# Patient Record
Sex: FEMALE | Race: BLACK OR AFRICAN AMERICAN | NOT HISPANIC OR LATINO | Employment: OTHER | URBAN - NONMETROPOLITAN AREA
[De-identification: names, ages, dates, MRNs, and addresses within clinical notes are randomized per-mention and may not be internally consistent; named-entity substitution may affect disease eponyms.]

---

## 2022-02-09 ENCOUNTER — APPOINTMENT (OUTPATIENT)
Dept: CT IMAGING | Facility: HOSPITAL | Age: 72
End: 2022-02-09

## 2022-02-09 ENCOUNTER — HOSPITAL ENCOUNTER (OUTPATIENT)
Facility: HOSPITAL | Age: 72
Setting detail: OBSERVATION
LOS: 1 days | Discharge: NURSING, REHAB OR LONG-TERM CARE | End: 2022-02-15
Attending: EMERGENCY MEDICINE | Admitting: STUDENT IN AN ORGANIZED HEALTH CARE EDUCATION/TRAINING PROGRAM

## 2022-02-09 ENCOUNTER — APPOINTMENT (OUTPATIENT)
Dept: GENERAL RADIOLOGY | Facility: HOSPITAL | Age: 72
End: 2022-02-09

## 2022-02-09 DIAGNOSIS — I63.9 CEREBROVASCULAR ACCIDENT (CVA), UNSPECIFIED MECHANISM: Primary | ICD-10-CM

## 2022-02-09 LAB
ALBUMIN SERPL-MCNC: 4.5 G/DL (ref 3.5–5.2)
ALBUMIN/GLOB SERPL: 1.5 G/DL
ALP SERPL-CCNC: 111 U/L (ref 39–117)
ALT SERPL W P-5'-P-CCNC: 17 U/L (ref 1–33)
ANION GAP SERPL CALCULATED.3IONS-SCNC: 13.9 MMOL/L (ref 5–15)
AST SERPL-CCNC: 20 U/L (ref 1–32)
BASOPHILS # BLD AUTO: 0.03 10*3/MM3 (ref 0–0.2)
BASOPHILS NFR BLD AUTO: 0.5 % (ref 0–1.5)
BILIRUB SERPL-MCNC: 0.2 MG/DL (ref 0–1.2)
BUN SERPL-MCNC: 19 MG/DL (ref 8–23)
BUN/CREAT SERPL: 15.7 (ref 7–25)
CALCIUM SPEC-SCNC: 9.8 MG/DL (ref 8.6–10.5)
CHLORIDE SERPL-SCNC: 101 MMOL/L (ref 98–107)
CO2 SERPL-SCNC: 23.1 MMOL/L (ref 22–29)
CREAT SERPL-MCNC: 1.21 MG/DL (ref 0.57–1)
DEPRECATED RDW RBC AUTO: 44 FL (ref 37–54)
EOSINOPHIL # BLD AUTO: 0.36 10*3/MM3 (ref 0–0.4)
EOSINOPHIL NFR BLD AUTO: 5.8 % (ref 0.3–6.2)
ERYTHROCYTE [DISTWIDTH] IN BLOOD BY AUTOMATED COUNT: 13.2 % (ref 12.3–15.4)
GFR SERPL CREATININE-BSD FRML MDRD: 53 ML/MIN/1.73
GLOBULIN UR ELPH-MCNC: 3 GM/DL
GLUCOSE SERPL-MCNC: 130 MG/DL (ref 65–99)
HCT VFR BLD AUTO: 37.6 % (ref 34–46.6)
HGB BLD-MCNC: 12.5 G/DL (ref 12–15.9)
IMM GRANULOCYTES # BLD AUTO: 0.01 10*3/MM3 (ref 0–0.05)
IMM GRANULOCYTES NFR BLD AUTO: 0.2 % (ref 0–0.5)
INR PPP: 0.84 (ref 0.9–1.1)
LYMPHOCYTES # BLD AUTO: 2.76 10*3/MM3 (ref 0.7–3.1)
LYMPHOCYTES NFR BLD AUTO: 44.1 % (ref 19.6–45.3)
MCH RBC QN AUTO: 30.1 PG (ref 26.6–33)
MCHC RBC AUTO-ENTMCNC: 33.2 G/DL (ref 31.5–35.7)
MCV RBC AUTO: 90.6 FL (ref 79–97)
MONOCYTES # BLD AUTO: 0.35 10*3/MM3 (ref 0.1–0.9)
MONOCYTES NFR BLD AUTO: 5.6 % (ref 5–12)
NEUTROPHILS NFR BLD AUTO: 2.75 10*3/MM3 (ref 1.7–7)
NEUTROPHILS NFR BLD AUTO: 43.8 % (ref 42.7–76)
NRBC BLD AUTO-RTO: 0 /100 WBC (ref 0–0.2)
NT-PROBNP SERPL-MCNC: 20.5 PG/ML (ref 0–900)
PLATELET # BLD AUTO: 395 10*3/MM3 (ref 140–450)
PMV BLD AUTO: 8.5 FL (ref 6–12)
POTASSIUM SERPL-SCNC: 4.3 MMOL/L (ref 3.5–5.2)
PROT SERPL-MCNC: 7.5 G/DL (ref 6–8.5)
PROTHROMBIN TIME: 12 SECONDS (ref 12–15.1)
RBC # BLD AUTO: 4.15 10*6/MM3 (ref 3.77–5.28)
SARS-COV-2 RNA PNL SPEC NAA+PROBE: NOT DETECTED
SODIUM SERPL-SCNC: 138 MMOL/L (ref 136–145)
TROPONIN T SERPL-MCNC: <0.01 NG/ML (ref 0–0.03)
WBC NRBC COR # BLD: 6.26 10*3/MM3 (ref 3.4–10.8)

## 2022-02-09 PROCEDURE — 85025 COMPLETE CBC W/AUTO DIFF WBC: CPT | Performed by: EMERGENCY MEDICINE

## 2022-02-09 PROCEDURE — 99220 PR INITIAL OBSERVATION CARE/DAY 70 MINUTES: CPT | Performed by: INTERNAL MEDICINE

## 2022-02-09 PROCEDURE — 85610 PROTHROMBIN TIME: CPT | Performed by: EMERGENCY MEDICINE

## 2022-02-09 PROCEDURE — 84484 ASSAY OF TROPONIN QUANT: CPT | Performed by: EMERGENCY MEDICINE

## 2022-02-09 PROCEDURE — 87635 SARS-COV-2 COVID-19 AMP PRB: CPT | Performed by: INTERNAL MEDICINE

## 2022-02-09 PROCEDURE — 96372 THER/PROPH/DIAG INJ SC/IM: CPT

## 2022-02-09 PROCEDURE — 93005 ELECTROCARDIOGRAM TRACING: CPT | Performed by: EMERGENCY MEDICINE

## 2022-02-09 PROCEDURE — 70450 CT HEAD/BRAIN W/O DYE: CPT

## 2022-02-09 PROCEDURE — 83880 ASSAY OF NATRIURETIC PEPTIDE: CPT | Performed by: EMERGENCY MEDICINE

## 2022-02-09 PROCEDURE — 25010000002 HEPARIN (PORCINE) PER 1000 UNITS: Performed by: INTERNAL MEDICINE

## 2022-02-09 PROCEDURE — G0378 HOSPITAL OBSERVATION PER HR: HCPCS

## 2022-02-09 PROCEDURE — 96361 HYDRATE IV INFUSION ADD-ON: CPT

## 2022-02-09 PROCEDURE — 25010000002 IOPAMIDOL 61 % SOLUTION: Performed by: EMERGENCY MEDICINE

## 2022-02-09 PROCEDURE — 80053 COMPREHEN METABOLIC PANEL: CPT | Performed by: EMERGENCY MEDICINE

## 2022-02-09 PROCEDURE — 96360 HYDRATION IV INFUSION INIT: CPT

## 2022-02-09 PROCEDURE — 99284 EMERGENCY DEPT VISIT MOD MDM: CPT

## 2022-02-09 PROCEDURE — 0042T HC CT CEREBRAL PERFUSION W/WO CONTRAST: CPT

## 2022-02-09 PROCEDURE — 70496 CT ANGIOGRAPHY HEAD: CPT

## 2022-02-09 PROCEDURE — 71045 X-RAY EXAM CHEST 1 VIEW: CPT

## 2022-02-09 PROCEDURE — 70498 CT ANGIOGRAPHY NECK: CPT

## 2022-02-09 RX ORDER — HEPARIN SODIUM 5000 [USP'U]/ML
5000 INJECTION, SOLUTION INTRAVENOUS; SUBCUTANEOUS EVERY 12 HOURS SCHEDULED
Status: DISCONTINUED | OUTPATIENT
Start: 2022-02-09 | End: 2022-02-15 | Stop reason: HOSPADM

## 2022-02-09 RX ORDER — CLOPIDOGREL BISULFATE 75 MG/1
300 TABLET ORAL ONCE
Status: COMPLETED | OUTPATIENT
Start: 2022-02-09 | End: 2022-02-09

## 2022-02-09 RX ORDER — ATORVASTATIN CALCIUM 80 MG/1
80 TABLET, FILM COATED ORAL NIGHTLY
Status: DISCONTINUED | OUTPATIENT
Start: 2022-02-09 | End: 2022-02-15 | Stop reason: HOSPADM

## 2022-02-09 RX ORDER — ACETAMINOPHEN 650 MG/1
650 SUPPOSITORY RECTAL EVERY 4 HOURS PRN
Status: DISCONTINUED | OUTPATIENT
Start: 2022-02-09 | End: 2022-02-15 | Stop reason: HOSPADM

## 2022-02-09 RX ORDER — ASPIRIN 81 MG/1
81 TABLET, CHEWABLE ORAL DAILY
COMMUNITY
End: 2022-06-23 | Stop reason: DRUGHIGH

## 2022-02-09 RX ORDER — ASPIRIN 81 MG/1
324 TABLET, CHEWABLE ORAL ONCE
Status: COMPLETED | OUTPATIENT
Start: 2022-02-09 | End: 2022-02-09

## 2022-02-09 RX ORDER — ASPIRIN 300 MG/1
300 SUPPOSITORY RECTAL DAILY
Status: DISCONTINUED | OUTPATIENT
Start: 2022-02-09 | End: 2022-02-12

## 2022-02-09 RX ORDER — ACETAMINOPHEN 325 MG/1
650 TABLET ORAL EVERY 4 HOURS PRN
Status: DISCONTINUED | OUTPATIENT
Start: 2022-02-09 | End: 2022-02-15 | Stop reason: HOSPADM

## 2022-02-09 RX ORDER — SODIUM CHLORIDE 0.9 % (FLUSH) 0.9 %
10 SYRINGE (ML) INJECTION EVERY 12 HOURS SCHEDULED
Status: DISCONTINUED | OUTPATIENT
Start: 2022-02-09 | End: 2022-02-12

## 2022-02-09 RX ORDER — DEXTROSE MONOHYDRATE 25 G/50ML
25 INJECTION, SOLUTION INTRAVENOUS
Status: DISCONTINUED | OUTPATIENT
Start: 2022-02-09 | End: 2022-02-15 | Stop reason: HOSPADM

## 2022-02-09 RX ORDER — CLOPIDOGREL BISULFATE 75 MG/1
75 TABLET ORAL DAILY
Status: DISCONTINUED | OUTPATIENT
Start: 2022-02-09 | End: 2022-02-15 | Stop reason: HOSPADM

## 2022-02-09 RX ORDER — GLIPIZIDE 10 MG/1
10 TABLET ORAL DAILY
COMMUNITY
End: 2022-06-23

## 2022-02-09 RX ORDER — SODIUM CHLORIDE 9 MG/ML
125 INJECTION, SOLUTION INTRAVENOUS CONTINUOUS
Status: DISCONTINUED | OUTPATIENT
Start: 2022-02-09 | End: 2022-02-12

## 2022-02-09 RX ORDER — MULTIPLE VITAMINS W/ MINERALS TAB 9MG-400MCG
1 TAB ORAL DAILY
COMMUNITY

## 2022-02-09 RX ORDER — NICOTINE POLACRILEX 4 MG
1 LOZENGE BUCCAL
Status: DISCONTINUED | OUTPATIENT
Start: 2022-02-09 | End: 2022-02-15 | Stop reason: HOSPADM

## 2022-02-09 RX ORDER — CHLORAL HYDRATE 500 MG
1000 CAPSULE ORAL
COMMUNITY

## 2022-02-09 RX ORDER — PROMETHAZINE HYDROCHLORIDE 25 MG/1
12.5 SUPPOSITORY RECTAL EVERY 6 HOURS PRN
Status: DISCONTINUED | OUTPATIENT
Start: 2022-02-09 | End: 2022-02-15 | Stop reason: HOSPADM

## 2022-02-09 RX ORDER — ASPIRIN 81 MG/1
81 TABLET, CHEWABLE ORAL DAILY
Status: DISCONTINUED | OUTPATIENT
Start: 2022-02-09 | End: 2022-02-15 | Stop reason: HOSPADM

## 2022-02-09 RX ORDER — PROMETHAZINE HYDROCHLORIDE 12.5 MG/1
12.5 TABLET ORAL EVERY 6 HOURS PRN
Status: DISCONTINUED | OUTPATIENT
Start: 2022-02-09 | End: 2022-02-15 | Stop reason: HOSPADM

## 2022-02-09 RX ORDER — SODIUM CHLORIDE 0.9 % (FLUSH) 0.9 %
10 SYRINGE (ML) INJECTION AS NEEDED
Status: DISCONTINUED | OUTPATIENT
Start: 2022-02-09 | End: 2022-02-15 | Stop reason: HOSPADM

## 2022-02-09 RX ADMIN — HEPARIN SODIUM 5000 UNITS: 5000 INJECTION, SOLUTION INTRAVENOUS; SUBCUTANEOUS at 21:39

## 2022-02-09 RX ADMIN — IOPAMIDOL 50 ML: 612 INJECTION, SOLUTION INTRAVENOUS at 17:40

## 2022-02-09 RX ADMIN — CLOPIDOGREL BISULFATE 300 MG: 75 TABLET ORAL at 17:24

## 2022-02-09 RX ADMIN — SODIUM CHLORIDE 125 ML/HR: 9 INJECTION, SOLUTION INTRAVENOUS at 18:18

## 2022-02-09 RX ADMIN — IOPAMIDOL 100 ML: 612 INJECTION, SOLUTION INTRAVENOUS at 17:40

## 2022-02-09 RX ADMIN — SODIUM CHLORIDE, PRESERVATIVE FREE 10 ML: 5 INJECTION INTRAVENOUS at 21:43

## 2022-02-09 RX ADMIN — ASPIRIN 81 MG CHEWABLE TABLET 324 MG: 81 TABLET CHEWABLE at 17:24

## 2022-02-09 NOTE — ED PROVIDER NOTES
TRIAGE CHIEF COMPLAINT:     Nursing and triage notes reviewed    Chief Complaint   Patient presents with   • Facial Droop      HPI: Deborah Jones is a 71 y.o. female who presents to the emergency department complaining of right-sided facial droop and right-sided leg weakness.  Patient states that around 3 to 3-1/2 hours prior to arrival she noticed some heaviness in her right lower extremity.  She was still able to move it and walk but states it was harder than normal.  She states this lasted approximately an hour and has resolved.  She states there is still a slight odd sensation but does not feel weak currently.  She states approximately an hour and half prior to arrival she noticed some drooping on the right side of her face.  She denies any weakness in her right upper extremity.  She denies any symptoms in her left upper extremity, the left side of her face, her left lower extremity.  She denies any history of stroke.  She does have a history of diabetes.  She denies chest pain, shortness of breath, palpitations.  No fevers or chills.    REVIEW OF SYSTEMS: All other systems reviewed and are negative     PAST MEDICAL HISTORY:   No past medical history on file.     FAMILY HISTORY:   No family history on file.     SOCIAL HISTORY:   Social History     Socioeconomic History   • Marital status:         SURGICAL HISTORY:   No past surgical history on file.     CURRENT MEDICATIONS:      Medication List      ASK your doctor about these medications    glipizide 10 MG tablet  Commonly known as: GLUCOTROL     metFORMIN 1000 MG tablet  Commonly known as: GLUCOPHAGE             ALLERGIES: Patient has no known allergies.     PHYSICAL EXAM:   VITAL SIGNS:   Vitals:    02/09/22 1614   BP: 142/95   Pulse: 97   Resp: 19   Temp: 97.6 °F (36.4 °C)   SpO2: 99%      CONSTITUTIONAL: Awake, oriented, appears nontoxic   HENT: Atraumatic, normocephalic, oral mucosa pink and moist, airway patent. Nares patent without drainage.  External ears normal.   EYES: Conjunctivae clear, EOMI, PERRL   NECK: Trachea midline, nontender, supple   CARDIOVASCULAR: Normal heart rate, Normal rhythm, No murmurs, rubs, gallops   PULMONARY/CHEST: Clear to auscultation, no rhonchi, wheezes, or rales. Symmetrical breath sounds.  ABDOMINAL: Nondistended, soft, nontender - no rebound or guarding.  NEUROLOGIC: There is no noticeable weakness of the right upper or right lower extremities when compared to the left.  There is mild facial droop on the right side of patient's face.  There may be subtle involvement in patient's right eye but no obvious loss of forehead creases.  Pupils are equal round and reactive to light.  EXTREMITIES: No clubbing, cyanosis, or edema   SKIN: Warm, Dry, No erythema, No rash     ED COURSE / MEDICAL DECISION MAKING:   Deborah Jones is a 71 y.o. female who presents to the emergency department for evaluation of symptoms concerning for stroke.  Patient is nondistressed on arrival and vital signs are stable.  Examination does reveal mild right-sided facial droop.  The remainder of the neurologic exam is unremarkable.  Patient sent over for CT stroke protocol immediately prior to my evaluating the patient.    I evaluated patient immediately after returning from CT stroke protocol.  No facial droop noted at that time.    CT scan has been interpreted by the time I returned to the computer, this reveals acute or subacute basal ganglia stroke.  Stroke neurologist is currently in conference with another patient in the emergency department and we will consult him immediately after he has done evaluating that patient.    After speaking to the neurologist he reviewed the CT scan as well as the report provided by the radiologist and agreed with this assessment of a stroke.  He recommended obtaining CT angiograms and an MRI and admitting the patient to the hospitalist service for further treatment and evaluation.  He recommended aspirin and Plavix.  Did  not recommend TPA at this time.    EKG interpreted by me reveals sinus rhythm with rate of 76 bpm.  There is locators voltage in chest leads.  No obvious acute ischemic findings.  This is an atypical appearing EKG.    Patient's laboratory tests are largely unremarkable including white blood cell count, cardiac enzymes.    We will admit to the hospitalist service for MRI and further evaluation.    Critical care time for this encounter excluding any procedure: 30 minutes    DECISION TO DISCHARGE/ADMIT: see ED care timeline     FINAL IMPRESSION:   1 --CVA  2 --   3 --     Electronically signed by: Radha Barboza MD, 2/9/2022 16:55 Radha Jennings MD  02/09/22 7714

## 2022-02-10 ENCOUNTER — APPOINTMENT (OUTPATIENT)
Dept: CARDIOLOGY | Facility: HOSPITAL | Age: 72
End: 2022-02-10

## 2022-02-10 ENCOUNTER — APPOINTMENT (OUTPATIENT)
Dept: MRI IMAGING | Facility: HOSPITAL | Age: 72
End: 2022-02-10

## 2022-02-10 LAB
ANION GAP SERPL CALCULATED.3IONS-SCNC: 9.2 MMOL/L (ref 5–15)
BASOPHILS # BLD AUTO: 0.02 10*3/MM3 (ref 0–0.2)
BASOPHILS NFR BLD AUTO: 0.4 % (ref 0–1.5)
BH CV ECHO MEAS - AO MAX PG (FULL): 4.3 MMHG
BH CV ECHO MEAS - AO MAX PG: 8 MMHG
BH CV ECHO MEAS - AO MEAN PG (FULL): 2 MMHG
BH CV ECHO MEAS - AO MEAN PG: 4 MMHG
BH CV ECHO MEAS - AO ROOT AREA (BSA CORRECTED): 1.5
BH CV ECHO MEAS - AO ROOT AREA: 4.3 CM^2
BH CV ECHO MEAS - AO ROOT DIAM: 2.4 CM
BH CV ECHO MEAS - AO V2 MAX: 139 CM/SEC
BH CV ECHO MEAS - AO V2 MEAN: 98.5 CM/SEC
BH CV ECHO MEAS - AO V2 VTI: 35.7 CM
BH CV ECHO MEAS - AVA(I,A): 1.8 CM^2
BH CV ECHO MEAS - AVA(I,D): 1.8 CM^2
BH CV ECHO MEAS - AVA(V,A): 1.9 CM^2
BH CV ECHO MEAS - AVA(V,D): 1.9 CM^2
BH CV ECHO MEAS - BSA(HAYCOCK): 1.6 M^2
BH CV ECHO MEAS - BSA: 1.5 M^2
BH CV ECHO MEAS - BZI_BMI: 24.4 KILOGRAMS/M^2
BH CV ECHO MEAS - BZI_METRIC_HEIGHT: 152.4 CM
BH CV ECHO MEAS - BZI_METRIC_WEIGHT: 56.7 KG
BH CV ECHO MEAS - EDV(CUBED): 80.6 ML
BH CV ECHO MEAS - EDV(MOD-SP2): 51.7 ML
BH CV ECHO MEAS - EDV(MOD-SP4): 68.7 ML
BH CV ECHO MEAS - EDV(TEICH): 84 ML
BH CV ECHO MEAS - EF(CUBED): 64.8 %
BH CV ECHO MEAS - EF(MOD-BP): 70 %
BH CV ECHO MEAS - EF(MOD-SP2): 66.7 %
BH CV ECHO MEAS - EF(MOD-SP4): 74.4 %
BH CV ECHO MEAS - EF(TEICH): 56.6 %
BH CV ECHO MEAS - ESV(CUBED): 28.4 ML
BH CV ECHO MEAS - ESV(MOD-SP2): 17.2 ML
BH CV ECHO MEAS - ESV(MOD-SP4): 17.6 ML
BH CV ECHO MEAS - ESV(TEICH): 36.4 ML
BH CV ECHO MEAS - FS: 29.4 %
BH CV ECHO MEAS - IVS/LVPW: 0.92
BH CV ECHO MEAS - IVSD: 0.73 CM
BH CV ECHO MEAS - LA DIMENSION: 2.4 CM
BH CV ECHO MEAS - LA/AO: 1
BH CV ECHO MEAS - LAD MAJOR: 5.5 CM
BH CV ECHO MEAS - LAT PEAK E' VEL: 6.6 CM/SEC
BH CV ECHO MEAS - LATERAL E/E' RATIO: 13.3
BH CV ECHO MEAS - LV DIASTOLIC VOL/BSA (35-75): 44.9 ML/M^2
BH CV ECHO MEAS - LV MASS(C)D: 99.2 GRAMS
BH CV ECHO MEAS - LV MASS(C)DI: 64.9 GRAMS/M^2
BH CV ECHO MEAS - LV MAX PG: 3.7 MMHG
BH CV ECHO MEAS - LV MEAN PG: 2 MMHG
BH CV ECHO MEAS - LV SYSTOLIC VOL/BSA (12-30): 11.5 ML/M^2
BH CV ECHO MEAS - LV V1 MAX: 96.8 CM/SEC
BH CV ECHO MEAS - LV V1 MEAN: 65.9 CM/SEC
BH CV ECHO MEAS - LV V1 VTI: 23.5 CM
BH CV ECHO MEAS - LVIDD: 4.3 CM
BH CV ECHO MEAS - LVIDS: 3.1 CM
BH CV ECHO MEAS - LVLD AP2: 6.4 CM
BH CV ECHO MEAS - LVLD AP4: 6.6 CM
BH CV ECHO MEAS - LVLS AP2: 4.8 CM
BH CV ECHO MEAS - LVLS AP4: 5.6 CM
BH CV ECHO MEAS - LVOT AREA (M): 2.7 CM^2
BH CV ECHO MEAS - LVOT AREA: 2.7 CM^2
BH CV ECHO MEAS - LVOT DIAM: 1.8 CM
BH CV ECHO MEAS - LVPWD: 0.79 CM
BH CV ECHO MEAS - MED PEAK E' VEL: 6.5 CM/SEC
BH CV ECHO MEAS - MEDIAL E/E' RATIO: 13.5
BH CV ECHO MEAS - MV A MAX VEL: 105 CM/SEC
BH CV ECHO MEAS - MV DEC TIME: 0.28 SEC
BH CV ECHO MEAS - MV E MAX VEL: 88.3 CM/SEC
BH CV ECHO MEAS - MV E/A: 0.84
BH CV ECHO MEAS - MV MAX PG: 5.4 MMHG
BH CV ECHO MEAS - MV MEAN PG: 2 MMHG
BH CV ECHO MEAS - MV V2 MAX: 116 CM/SEC
BH CV ECHO MEAS - MV V2 MEAN: 52.7 CM/SEC
BH CV ECHO MEAS - MV V2 VTI: 39.1 CM
BH CV ECHO MEAS - MVA(VTI): 1.6 CM^2
BH CV ECHO MEAS - PA ACC TIME: 0.08 SEC
BH CV ECHO MEAS - PA MAX PG (FULL): 2.5 MMHG
BH CV ECHO MEAS - PA MAX PG: 3.8 MMHG
BH CV ECHO MEAS - PA MEAN PG (FULL): 1 MMHG
BH CV ECHO MEAS - PA MEAN PG: 2 MMHG
BH CV ECHO MEAS - PA PR(ACCEL): 42.6 MMHG
BH CV ECHO MEAS - PA V2 MAX: 97.6 CM/SEC
BH CV ECHO MEAS - PA V2 MEAN: 68.1 CM/SEC
BH CV ECHO MEAS - PA V2 VTI: 21.8 CM
BH CV ECHO MEAS - RAP SYSTOLE: 3 MMHG
BH CV ECHO MEAS - RV MAX PG: 1.3 MMHG
BH CV ECHO MEAS - RV MEAN PG: 1 MMHG
BH CV ECHO MEAS - RV V1 MAX: 57.6 CM/SEC
BH CV ECHO MEAS - RV V1 MEAN: 41.3 CM/SEC
BH CV ECHO MEAS - RV V1 VTI: 12.8 CM
BH CV ECHO MEAS - RVSP: 27 MMHG
BH CV ECHO MEAS - SI(AO): 101.3 ML/M^2
BH CV ECHO MEAS - SI(CUBED): 34.2 ML/M^2
BH CV ECHO MEAS - SI(LVOT): 40.9 ML/M^2
BH CV ECHO MEAS - SI(MOD-SP2): 22.6 ML/M^2
BH CV ECHO MEAS - SI(MOD-SP4): 33.4 ML/M^2
BH CV ECHO MEAS - SI(TEICH): 31.1 ML/M^2
BH CV ECHO MEAS - SV(AO): 154.8 ML
BH CV ECHO MEAS - SV(CUBED): 52.2 ML
BH CV ECHO MEAS - SV(LVOT): 62.5 ML
BH CV ECHO MEAS - SV(MOD-SP2): 34.5 ML
BH CV ECHO MEAS - SV(MOD-SP4): 51.1 ML
BH CV ECHO MEAS - SV(TEICH): 47.5 ML
BH CV ECHO MEAS - TAPSE (>1.6): 2.1 CM
BH CV ECHO MEAS - TR MAX PG: 24 MMHG
BH CV ECHO MEAS - TR MAX VEL: 242.5 CM/SEC
BH CV ECHO MEASUREMENTS AVERAGE E/E' RATIO: 13.48
BH CV XLRA - RV BASE: 4.1 CM
BH CV XLRA - RV LENGTH: 6.4 CM
BH CV XLRA - RV MID: 2.9 CM
BH CV XLRA - TDI S': 15 CM/SEC
BUN SERPL-MCNC: 16 MG/DL (ref 8–23)
BUN/CREAT SERPL: 14.5 (ref 7–25)
CALCIUM SPEC-SCNC: 8.9 MG/DL (ref 8.6–10.5)
CHLORIDE SERPL-SCNC: 110 MMOL/L (ref 98–107)
CHOLEST SERPL-MCNC: 232 MG/DL (ref 0–200)
CO2 SERPL-SCNC: 20.8 MMOL/L (ref 22–29)
CREAT SERPL-MCNC: 1.1 MG/DL (ref 0.57–1)
DEPRECATED RDW RBC AUTO: 45.4 FL (ref 37–54)
EOSINOPHIL # BLD AUTO: 0.33 10*3/MM3 (ref 0–0.4)
EOSINOPHIL NFR BLD AUTO: 5.9 % (ref 0.3–6.2)
ERYTHROCYTE [DISTWIDTH] IN BLOOD BY AUTOMATED COUNT: 13.3 % (ref 12.3–15.4)
GFR SERPL CREATININE-BSD FRML MDRD: 60 ML/MIN/1.73
GLUCOSE BLDC GLUCOMTR-MCNC: 145 MG/DL (ref 70–130)
GLUCOSE BLDC GLUCOMTR-MCNC: 146 MG/DL (ref 70–130)
GLUCOSE BLDC GLUCOMTR-MCNC: 150 MG/DL (ref 70–130)
GLUCOSE BLDC GLUCOMTR-MCNC: 185 MG/DL (ref 70–130)
GLUCOSE SERPL-MCNC: 145 MG/DL (ref 65–99)
HBA1C MFR BLD: 7.8 % (ref 4.8–5.6)
HCT VFR BLD AUTO: 37.9 % (ref 34–46.6)
HDLC SERPL-MCNC: 61 MG/DL (ref 40–60)
HGB BLD-MCNC: 12.1 G/DL (ref 12–15.9)
IMM GRANULOCYTES # BLD AUTO: 0.01 10*3/MM3 (ref 0–0.05)
IMM GRANULOCYTES NFR BLD AUTO: 0.2 % (ref 0–0.5)
LDLC SERPL CALC-MCNC: 150 MG/DL (ref 0–100)
LDLC/HDLC SERPL: 2.42 {RATIO}
LEFT ATRIUM VOLUME INDEX: 32 ML/M^2
LEFT ATRIUM VOLUME: 48.9 ML
LYMPHOCYTES # BLD AUTO: 2.76 10*3/MM3 (ref 0.7–3.1)
LYMPHOCYTES NFR BLD AUTO: 49.4 % (ref 19.6–45.3)
MCH RBC QN AUTO: 29.7 PG (ref 26.6–33)
MCHC RBC AUTO-ENTMCNC: 31.9 G/DL (ref 31.5–35.7)
MCV RBC AUTO: 92.9 FL (ref 79–97)
MONOCYTES # BLD AUTO: 0.35 10*3/MM3 (ref 0.1–0.9)
MONOCYTES NFR BLD AUTO: 6.3 % (ref 5–12)
NEUTROPHILS NFR BLD AUTO: 2.12 10*3/MM3 (ref 1.7–7)
NEUTROPHILS NFR BLD AUTO: 37.8 % (ref 42.7–76)
NRBC BLD AUTO-RTO: 0 /100 WBC (ref 0–0.2)
PLATELET # BLD AUTO: 360 10*3/MM3 (ref 140–450)
PMV BLD AUTO: 8.7 FL (ref 6–12)
POTASSIUM SERPL-SCNC: 4.2 MMOL/L (ref 3.5–5.2)
RBC # BLD AUTO: 4.08 10*6/MM3 (ref 3.77–5.28)
SODIUM SERPL-SCNC: 140 MMOL/L (ref 136–145)
TRIGL SERPL-MCNC: 118 MG/DL (ref 0–150)
VLDLC SERPL-MCNC: 21 MG/DL (ref 5–40)
WBC NRBC COR # BLD: 5.59 10*3/MM3 (ref 3.4–10.8)

## 2022-02-10 PROCEDURE — 99204 OFFICE O/P NEW MOD 45 MIN: CPT | Performed by: PSYCHIATRY & NEUROLOGY

## 2022-02-10 PROCEDURE — 70551 MRI BRAIN STEM W/O DYE: CPT

## 2022-02-10 PROCEDURE — 96372 THER/PROPH/DIAG INJ SC/IM: CPT

## 2022-02-10 PROCEDURE — 92523 SPEECH SOUND LANG COMPREHEN: CPT

## 2022-02-10 PROCEDURE — G0378 HOSPITAL OBSERVATION PER HR: HCPCS

## 2022-02-10 PROCEDURE — 93306 TTE W/DOPPLER COMPLETE: CPT | Performed by: INTERNAL MEDICINE

## 2022-02-10 PROCEDURE — 82962 GLUCOSE BLOOD TEST: CPT

## 2022-02-10 PROCEDURE — 93306 TTE W/DOPPLER COMPLETE: CPT

## 2022-02-10 PROCEDURE — 80061 LIPID PANEL: CPT | Performed by: INTERNAL MEDICINE

## 2022-02-10 PROCEDURE — 25010000002 HEPARIN (PORCINE) PER 1000 UNITS: Performed by: INTERNAL MEDICINE

## 2022-02-10 PROCEDURE — 99225 PR SBSQ OBSERVATION CARE/DAY 25 MINUTES: CPT | Performed by: FAMILY MEDICINE

## 2022-02-10 PROCEDURE — 96361 HYDRATE IV INFUSION ADD-ON: CPT

## 2022-02-10 PROCEDURE — 92610 EVALUATE SWALLOWING FUNCTION: CPT

## 2022-02-10 PROCEDURE — 80048 BASIC METABOLIC PNL TOTAL CA: CPT | Performed by: INTERNAL MEDICINE

## 2022-02-10 PROCEDURE — 83036 HEMOGLOBIN GLYCOSYLATED A1C: CPT | Performed by: INTERNAL MEDICINE

## 2022-02-10 PROCEDURE — 85025 COMPLETE CBC W/AUTO DIFF WBC: CPT | Performed by: INTERNAL MEDICINE

## 2022-02-10 RX ORDER — DEXTROSE MONOHYDRATE 25 G/50ML
25 INJECTION, SOLUTION INTRAVENOUS
Status: DISCONTINUED | OUTPATIENT
Start: 2022-02-10 | End: 2022-02-15 | Stop reason: HOSPADM

## 2022-02-10 RX ORDER — NICOTINE POLACRILEX 4 MG
1 LOZENGE BUCCAL
Status: DISCONTINUED | OUTPATIENT
Start: 2022-02-10 | End: 2022-02-15 | Stop reason: HOSPADM

## 2022-02-10 RX ADMIN — HEPARIN SODIUM 5000 UNITS: 5000 INJECTION, SOLUTION INTRAVENOUS; SUBCUTANEOUS at 21:05

## 2022-02-10 RX ADMIN — CLOPIDOGREL BISULFATE 75 MG: 75 TABLET ORAL at 11:30

## 2022-02-10 RX ADMIN — SODIUM CHLORIDE, PRESERVATIVE FREE 10 ML: 5 INJECTION INTRAVENOUS at 21:05

## 2022-02-10 RX ADMIN — ATORVASTATIN CALCIUM 80 MG: 80 TABLET, FILM COATED ORAL at 21:05

## 2022-02-10 RX ADMIN — HEPARIN SODIUM 5000 UNITS: 5000 INJECTION, SOLUTION INTRAVENOUS; SUBCUTANEOUS at 09:00

## 2022-02-10 RX ADMIN — SODIUM CHLORIDE 125 ML/HR: 9 INJECTION, SOLUTION INTRAVENOUS at 03:35

## 2022-02-10 RX ADMIN — ASPIRIN 81 MG CHEWABLE TABLET 81 MG: 81 TABLET CHEWABLE at 11:30

## 2022-02-10 RX ADMIN — SODIUM CHLORIDE, PRESERVATIVE FREE 10 ML: 5 INJECTION INTRAVENOUS at 09:00

## 2022-02-10 NOTE — CASE MANAGEMENT/SOCIAL WORK
Discharge Planning Assessment  [facility]     Patient Name: Deborah Jones  MRN: [id number]  Today's Date: 2/10/2022    Admit Date: 2/9/2022     Discharge Needs Assessment     Row Name 02/10/22 1256       Living Environment    Lives With spouse    Name(s) of Who Lives With Patient just got     Current Living Arrangements home/apartment/condo    Primary Care Provided by self    Provides Primary Care For no one    Family Caregiver if Needed spouse    Quality of Family Relationships supportive    Able to Return to Prior Arrangements yes    Living Arrangement Comments if needed will agree to rehab       Resource/Environmental Concerns    Resource/Environmental Concerns none       Transition Planning    Patient/Family Anticipates Transition to inpatient rehabilitation facility; home with help/services    Patient/Family Anticipated Services at Transition home health care; rehabilitation services    Transportation Anticipated family or friend will provide       Discharge Needs Assessment    Readmission Within the Last 30 Days no previous admission in last 30 days    Equipment Currently Used at Home glucometer    Concerns to be Addressed discharge planning    Anticipated Changes Related to Illness inability to care for self    Equipment Needed After Discharge walker, rolling    Outpatient/Agency/Support Group Needs homecare agency; inpatient rehabilitation facility    Discharge Facility/Level of Care Needs home with home health; rehabilitation facility    Provided Post Acute Provider List? N/A    Provided Post Acute Provider Quality & Resource List? N/A    Delivered To Patient; Support Person    Method of Delivery In person    Patient's Choice of Community Agency(s) discussed rehab options and didn't want to see list; chose [facility] , daughter works there for many years as a PCT    Current Discharge Risk physical impairment               Discharge Plan     Row Name 02/10/22 1300       Plan    Plan pt resting in  bed; daughter Nisa at bedside; permission to speak with her also; pt alert and joyful; pt stated daughter works as pct at [employer] for many years and she has worked since she was very young also; pt just got ; per daughter before this stroke pt very active; gardens, walks, totally cares for herself with no issues and also helps her  care for his 93 year old mother that lives with them; discussed option of possible rehab if needed and they stated if needed she wanted to go to [facility] and approved the sending of referral; stated primary physicain is at [facility]; completed moon with pt; CM info card given and explained; no other needs at this time; referral to be sent to [facility]              Continued Care and Services - Admitted Since 2/9/2022    Coordination has not been started for this encounter.          Demographic Summary     Row Name 02/10/22 9328       General Information    Admission Type observation    Arrived From emergency department    Required Notices Provided Observation Status Notice  completed moon with pt    Referral Source admission list    Reason for Consult discharge planning    Preferred Language English     Used During This Interaction no       Contact Information    Permission Granted to Share Info With family/designee  stated can speak with daughter Nisa in room also               Functional Status     Row Name 02/10/22 1255       Functional Status    Usual Activity Tolerance good    Current Activity Tolerance good    Functional Status Comments before this stroke; pulling weeds in garden ; walks, very active       Functional Status, IADL    Medications independent    Meal Preparation independent    Housekeeping independent    Laundry independent    Shopping independent       Mental Status    General Appearance WDL ex  slight slur and left side of face noted to be slightly drooped       Mental Status Summary    Recent Changes in Mental  Status/Cognitive Functioning no changes                         Pat Borges, RN

## 2022-02-10 NOTE — CASE MANAGEMENT/SOCIAL WORK
Continued Stay Note  [facility]     Patient Name: Deborah Jones  MRN: [id number]  Today's Date: 2/10/2022    Admit Date: 2/9/2022     Discharge Plan     Row Name 02/10/22 1356       Plan    Plan called and spoke to [facility] stated would look over and let us know; will watch for PT notes tomorrow to assess also, since pt bedridden today    Row Name 02/10/22 1300       Plan    Plan pt resting in bed; daughter Bib at bedside; permission to speak with her also; pt alert and joyful; pt stated daughter works as pct at [employer] for many years and she has worked since she was very young;               Discharge Codes    No documentation.                     Pat Borges RN

## 2022-02-10 NOTE — THERAPY EVALUATION
Hold OT eval per neuro; strict bedrest with head of bed flat for 24 hours per patient/family reporting; will follow up tomorrow as appropriate.

## 2022-02-10 NOTE — PLAN OF CARE
Problem: Adult Inpatient Plan of Care  Goal: Plan of Care Review  Outcome: Ongoing, Progressing  Flowsheets  Taken 2/10/2022 1210 by Lu Barcenas  Progress: no change  Outcome Summary: Bedside eval of pt.'s swallow at bedside and informal communication assessment completed.  Pt. was seated upright in bed with her  present.  Pt. was in good spirits and voiced complaints only of R sided weakness and some slurring of speech.  Oral mech was remarkable for mild dysarthria of speech due to oral weakness, but intelligibility not significantly affected.  Pt. exibited good awareness of deficits as well.  She was given trials of regular solid, puree, and thin liquid.  Oral phase was WFL with all trials.  Mild oral strength deficits did not adversely affect bolus prep, manipulation, transit, or oral clearance with any trial.  No overt s/s aspiration with any trial.  Pt. denied difficulty with po.  Regarding commuication, auditory comprehension was WFL, verbal expression was WFL, and cognition was WFL.  No appreciable deficits exhibited that would adversely affect swallowing or commuication for ADL's or social needs.  Pt. will be given instruction and education in oral motor exercises to improve R labial strength.  Recommend:  1. regular diet with thin liq as herve,  2. meds whole with thin liq as herve,  3. aspiration precautions.  Taken 2/10/2022 0311 by Catarina Jacobs, RN  Plan of Care Reviewed With: patient   Goal Outcome Evaluation:           Progress: no change  Outcome Summary: Bedside eval of pt.'s swallow at bedside and informal communication assessment completed.  Pt. was seated upright in bed with her  present.  Pt. was in good spirits and voiced complaints only of R sided weakness and some slurring of speech.  Oral mech was remarkable for mild dysarthria of speech due to oral weakness, but intelligibility not significantly affected.  Pt. exibited good awareness of deficits as well.  She was given  trials of regular solid, puree, and thin liquid.  Oral phase was WFL with all trials.  Mild oral strength deficits did not adversely affect bolus prep, manipulation, transit, or oral clearance with any trial.  No overt s/s aspiration with any trial.  Pt. denied difficulty with po.  Regarding commuication, auditory comprehension was WFL, verbal expression was WFL, and cognition was WFL.  No appreciable deficits exhibited that would adversely affect swallowing or commuication for ADL's or social needs.  Pt. will be given instruction and education in oral motor exercises to improve R labial strength.  Recommend:  1. regular diet with thin liq as herve,  2. meds whole with thin liq as herve,  3. aspiration precautions.

## 2022-02-10 NOTE — CONSULTS
Stroke Consult Note    Patient Name: Deborah Jones   MRN: [id number]  Age: 71 y.o.  Sex: female  : 1950    Primary Care Physician: Provider, No Known  Referring Physician:  No ref. provider found    Handedness: Right  Race: Black    Chief Complaint/Reason for Consultation: Right-sided weakness    Subjective .  HPI: 71-year-old right-handed black female with known diagnosis of hypertension, diabetes, smoking cigars, who comes in with acute onset of right-sided weakness starting at 2:30 PM yesterday.  Patient was out of the window for TPA.  Patient noted to have mild right hemiparesis and right facial droop and mild dysarthria.  No history of stroke in the past.  Patient is on aspirin 81 mg daily, although she does not take it every day.    Last Known Normal Date/Time: 2:30 PM to 2022 EST     Review of Systems   Constitutional: Negative.    HENT: Negative.    Eyes: Negative.    Respiratory: Negative.    Cardiovascular: Negative.    Gastrointestinal: Negative.    Endocrine: Negative.    Genitourinary: Negative.    Musculoskeletal: Negative.    Skin: Negative.    Allergic/Immunologic: Negative.    Psychiatric/Behavioral: Negative.       No past medical history on file.  No past surgical history on file.  No family history on file.  Social History     Socioeconomic History   • Marital status:    Tobacco Use   • Smoking status: Current Every Day Smoker     Types: Cigars     No Known Allergies  Prior to Admission medications    Medication Sig Start Date End Date Taking? Authorizing Provider   aspirin 81 MG chewable tablet Chew 81 mg Daily.   Yes Dali Manuel MD   glipizide (GLUCOTROL) 10 MG tablet Take 10 mg by mouth Daily.   Yes Dali Manuel MD   metFORMIN (GLUCOPHAGE) 1000 MG tablet Take 1,000 mg by mouth Daily.   Yes Dali Manuel MD   multivitamin with minerals tablet tablet Take 1 tablet by mouth Daily.   Yes Dali Manuel MD   Omega-3 Fatty Acids (fish oil) 1000 MG  capsule capsule Take 1,000 mg by mouth Daily With Breakfast.   Yes Provider, MD Dali             Objective     Temp:  [97.6 °F (36.4 °C)-98.3 °F (36.8 °C)] 97.9 °F (36.6 °C)  Heart Rate:  [55-97] 55  Resp:  [17-20] 17  BP: (118-142)/(63-98) 133/83  Neurological Exam  Mental Status  Awake, alert and oriented to person, place and time.Alert. Mild dysarthria present. Language is fluent with no aphasia. Attention and concentration are normal.    Cranial Nerves  CN II: Visual fields full to confrontation.  CN III, IV, VI: Extraocular movements intact bilaterally. Pupils equal round and reactive to light bilaterally.  CN V: Facial sensation is normal.  CN VII: Full and symmetric facial movement.  CN VIII: Equal hearing.  CN IX, X: Palate elevates symmetrically. Normal gag reflex.  CN XI: Shoulder shrug strength is normal.  CN XII: Tongue midline without atrophy or fasciculations.    Motor  Normal muscle bulk throughout. No fasciculations present.  RUE/RLE- drift noted.    Sensory  Light touch is normal in upper and lower extremities.     Reflexes  Not assessed.    Coordination  No dysmetria.    Gait  Not assesed.      Physical Exam  Vitals and nursing note reviewed.   Constitutional:       Appearance: Normal appearance.   HENT:      Head: Normocephalic and atraumatic.      Mouth/Throat:      Mouth: Mucous membranes are moist.      Pharynx: Oropharynx is clear.   Eyes:      Extraocular Movements: Extraocular movements intact.      Conjunctiva/sclera: Conjunctivae normal.      Pupils: Pupils are equal, round, and reactive to light.   Cardiovascular:      Rate and Rhythm: Normal rate and regular rhythm.   Pulmonary:      Effort: Pulmonary effort is normal. No respiratory distress.   Musculoskeletal:      Cervical back: Normal range of motion and neck supple.   Neurological:      Mental Status: She is alert.      Cranial Nerves: Dysarthria present.   Psychiatric:         Mood and Affect: Mood normal.         Behavior:  Behavior normal.         Acute Stroke Data    IV Thrombolytic (TPA/Tenecteplase) Inclusion / Exclusion Criteria    Time: 11:47 EST  Person Administering Scale: Seth Perez MD    Inclusion Criteria  [x]   18 years of age or greater   []   Onset of symptoms < 4.5 hours before beginning treatment (stroke onset = time patient was last seen well or without symptoms).   [x]   Diagnosis of acute ischemic stroke causing measurable disabling deficit (Complete Hemianopia, Any Aphasia, Visual or Sensory Extinction, Any weakness limiting sustained effort against gravity)   []   Any remaining deficit considered potentially disabling in view of patient and practitioner   Exclusion criteria (Do not proceed with Alteplase if any are checked under exclusion criteria)  [x]   Onset unknown or GREATER than 4.5 hours   []   ICH on CT/MRI   []   CT demonstrates hypodensity representing acute or subacute infarct   []   Significant head trauma or prior stroke in the previous 3 months   []   Symptoms suggestive of subarachnoid hemorrhage   []   History of un-ruptured intracranial aneurysm GREATER than 10 mm   []   Recent intracranial or intraspinal surgery within the last 3 months   []   Arterial puncture at a non-compressible site in the previous 7 days   []   Active internal bleeding   []   Acute bleeding tendency   []   Platelet count LESS than 100,000 for known hematological diseases such as leukemia, thrombocytopenia or chronic cirrhosis   []   Current use of anticoagulant with INR GREATER than 1.7 or PT GREATER than 15 seconds, aPTT GREATER than 40 seconds   []   Heparin received within 48 hours, resulting in abnormally elevated aPTT GREATER than upper limit of normal   []   Current use of direct thrombin inhibitors or direct factor Xa inhibitors in the past 48 hours   []   Elevated blood pressure refractory to treatment (systolic GREATER than 185 mm/Hg or diastolic  GREATER than 110 mm/Hg   []   Suspected infective endocarditis  and aortic arch dissection   []   Current use of therapeutic treatment dose of low-molecular-weight heparin (LMWH) within the previous 24 hours   []   Structural GI malignancy or bleed   Relative exclusion for all patients  []   Only minor nondisabling symptoms   []   Pregnancy   []   Seizure at onset with postictal residual neurological impairments   []   Major surgery or previous trauma within past 14 days   []   History of previous spontaneous ICH, intracranial neoplasm, or AV malformation   []   Postpartum (within previous 14 days)   []   Recent GI or urinary tract hemorrhage (within previous 21 days)   []   Recent acute MI (within previous 3 months)   []   History of unruptured intracranial aneurysm LESS than 10 mm   []   History of ruptured intracranial aneurysm   []   Blood glucose LESS than 50 mg/dL (2.7 mmol/L)   []   Dural puncture within the last 7 days   []   Known GREATER than 10 cerebral microbleeds   Additional exclusions for patients with symptoms onset between 3 and 4.5 hours.  []   Age > 80.   []   On any anticoagulants regardless of INR  >>> Warfarin (Coumadin), Heparin, Enoxaparin (Lovenox), fondaparinux (Arixtra), bivalirudin (Angiomax), Argatroban, dabigatran (Pradaxa), rivaroxaban (Xarelto), or apixaban (Eliquis)   []   Severe stroke (NIHSS > 25).   []   History of BOTH diabetes and previous ischemic stroke.   []   The risks and benefits have been discussed with the patient or family related to the administration of IV alteplase for stroke symptoms.   []   I have discussed and reviewed the patient's case and imaging with the attending prior to IV Thrombolytic (TPA/Tenecteplase).    Time Thrombolytic administered       Hospital Meds:  Scheduled- aspirin, 81 mg, Oral, Daily   Or  aspirin, 300 mg, Rectal, Daily  atorvastatin, 80 mg, Oral, Nightly  clopidogrel, 75 mg, Oral, Daily  heparin (porcine), 5,000 Units, Subcutaneous, Q12H  insulin aspart, 0-7 Units, Subcutaneous, TID AC  sodium chloride,  10 mL, Intravenous, Q12H      Infusions- sodium chloride, 125 mL/hr, Last Rate: 125 mL/hr (02/10/22 1922)       PRNs- •  acetaminophen **OR** acetaminophen  •  dextrose  •  dextrose  •  glucagon (human recombinant)  •  promethazine **OR** promethazine  •  sodium chloride    Functional Status Prior to Current Stroke/Ida Score: 0    NIH Stroke Scale  Time: 11:47 EST  Person Administering Scale: Seth Perez MD    1a  Level of consciousness: 0=alert; keenly responsive   1b. LOC questions:  0=Performs both tasks correctly   1c. LOC commands: 0=Performs both tasks correctly   2.  Best Gaze: 0=normal   3.  Visual: 0=No visual loss   4. Facial Palsy: 2=Partial paralysis (total or near total paralysis of the lower face)   5a.  Motor left arm: 0=No drift, limb holds 90 (or 45) degrees for full 10 seconds   5b.  Motor right arm: 1=Drift, limb holds 90 (or 45) degrees but drifts down before full 10 seconds: does not hit bed   6a. motor left le=No drift, limb holds 90 (or 45) degrees for full 10 seconds   6b  Motor right le=Drift, limb holds 90 (or 45) degrees but drifts down before full 10 seconds: does not hit bed   7. Limb Ataxia: 0=Absent   8.  Sensory: 0=Normal; no sensory loss   9. Best Language:  0=No aphasia, normal   10. Dysarthria: 1=Mild to moderate, patient slurs at least some words and at worst, can be understood with some difficulty   11. Extinction and Inattention: 0=No abnormality    Total:   5       Results Reviewed:  I have personally reviewed current lab, radiology, and data  CT head shows hypodensity in the left basal ganglia region.  CT angiogram of head and neck shows no significant stenosis/occlusion.  CT perfusion looks okay.  MRI brain shows acute stroke in the left basal ganglia region in the posterior limb of internal capsule, in the anterior choroidal territory.  Mild white matter disease, no hemorrhage  A1c 7.8, , total cholesterol 232 and triglyceride of 118.               Assessment/Plan:      1. 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  Plan to continue her on dual antiplatelets, she was loaded with Plavix 300 mg yesterday, will continue with 75 mg today, aspirin 81 mg daily and Lipitor 80 mg daily for secondary stroke prevention.  Keep her strict bedrest, continue IV fluids at 125 cc an hour.  Allow for permissive hypertension up to systolic blood pressure of 200.  2. Essential hypertension.  Allow for permissive hypertension for next 24 hours, can start normalizing blood pressure from tomorrow.  Long-term blood pressure goals of less than 140.  3. Diabetes mellitus type 2 uncontrolled with hyperglycemia.  Her A1c is 7.8, goal of less than seven.  Avoid hypoglycemia.  Maintain normoglycemia  4. Mixed hyperlipidemia.  Continue full dose of statins.  5. Smoking.  Encouraged her to quit smoking cigars.  6. Keep her strict bedrest with head of bed flat today.  Okay to raise it up while eating.  7. Has clears bedside swallow evaluation.  Healthy heart/diabetic diet.    Case was discussed with patient, her daughter, nursing and the primary team.  Thank you for the consult.          Seth Perez MD  February 10, 2022  11:47 EST    Verbal consent taken.  Patient agreeable to be seen via telemedicine.    This was an audio and video enabled telemedicine encounter.

## 2022-02-10 NOTE — THERAPY EVALUATION
Hold PT eval per neuro; strict bedrest with HOB flat for 24 hours per patient/family report. PT will follow up with patient tomorrow and proceed with eval as tolerated and appropriate.

## 2022-02-10 NOTE — THERAPY EVALUATION
Acute Care - Speech Language Pathology   Swallow Initial Evaluation [facility]     Patient Name: Deborah Jones  : 1950  MRN: [id number]  Today's Date: 2/10/2022               Admit Date: 2022    Visit Dx:     ICD-10-CM ICD-9-CM   1. Cerebrovascular accident (CVA), unspecified mechanism (HCC)  I63.9 434.91     Patient Active Problem List   Diagnosis   • Cerebrovascular accident (CVA) (HCC)     No past medical history on file.  No past surgical history on file.    SLP Recommendation and Plan  SLP Swallowing Diagnosis: swallow WFL (02/10/22 0945)  SLP Diet Recommendation: regular textures, thin liquids (02/10/22 0945)  Recommended Precautions and Strategies: upright posture during/after eating, general aspiration precautions (02/10/22 0945)  SLP Rec. for Method of Medication Administration: meds whole (02/10/22 0945)     Monitor for Signs of Aspiration: yes, notify SLP if any concerns, cough, gurgly voice, throat clearing, right lower lobe infiltrates (02/10/22 0945)        Anticipated Discharge Disposition (SLP): unknown (02/10/22 0950)     Therapy Frequency (Swallow): PRN (plan to give only exercise program with instruction for R labial strengthening) (02/10/22 0945)                  Patient/Family Concerns, Anticipated Discharge Disposition (SLP): none voiced (02/10/22 0950)                  Progress: no change  Outcome Summary: Bedside eval of pt.'s swallow at bedside and informal communication assessment completed.  Pt. was seated upright in bed with her  present.  Pt. was in good spirits and voiced complaints only of R sided weakness and some slurring of speech.  Oral mech was remarkable for mild dysarthria of speech due to oral weakness, but intelligibility not significantly affected.  Pt. exibited good awareness of deficits as well.  She was given trials of regular solid, puree, and thin liquid.  Oral phase was WFL with all trials.  Mild oral strength deficits did not adversely affect bolus  prep, manipulation, transit, or oral clearance with any trial.  No overt s/s aspiration with any trial.  Pt. denied difficulty with po.  Regarding commuication, auditory comprehension was WFL, verbal expression was WFL, and cognition was WFL.  No appreciable deficits exhibited that would adversely affect swallowing or commuication for ADL's or social needs.  Pt. will be given instruction and education in oral motor exercises to improve R labial strength.  Recommend:  1. regular diet with thin liq as herve,  2. meds whole with thin liq as herve,  3. aspiration precautions.      SWALLOW EVALUATION (last 72 hours)     SLP Adult Swallow Evaluation     Row Name 02/10/22 0945                   Rehab Evaluation    Document Type evaluation  -TM        Subjective Information complains of; weakness  on R side  -TM        Patient Observations alert; cooperative  -TM        Patient/Family/Caregiver Comments/Observations spouse present  -TM        Patient Effort excellent  -TM                  General Information    Patient Profile Reviewed yes  -TM        Current Method of Nutrition nutritional supplements  -TM        Precautions/Limitations, Vision WFL  -TM        Precautions/Limitations, Hearing WFL  -TM        Prior Level of Function-Communication WFL  -TM        Prior Level of Function-Swallowing no diet consistency restrictions  -TM        Plans/Goals Discussed with patient; spouse/S.O.; other (see comments)  RN  -TM        Barriers to Rehab none identified  -TM        Patient's Goals for Discharge return to all previous roles/activities  -TM                  Pain    Additional Documentation Pain Scale: Numbers Pre/Post-Treatment (Group)  -TM                  Pain Scale: Numbers Pre/Post-Treatment    Pretreatment Pain Rating 0/10 - no pain  -TM        Posttreatment Pain Rating 0/10 - no pain  -TM                  Oral Motor Structure and Function    Oral Lesions or Structural Abnormalities and/or variants R labial asymmetry  and weakness  -TM        Dentition Assessment natural, present and adequate  -TM        Secretion Management WNL/WFL  -TM        Mucosal Quality moist, healthy  -TM        Volitional Cough WFL  -TM                  Oral Musculature and Cranial Nerve Assessment    Oral Motor General Assessment oral labial or buccal impairment  -TM        Oral Labial or Buccal Impairment, Detail, Cranial Nerve VII (Facial): right labial droop; reduced ROM  -TM                  General Eating/Swallowing Observations    Respiratory Support Currently in Use room air  -TM        Eating/Swallowing Skills self-fed; fed by SLP  -TM        Positioning During Eating upright in bed  -TM        Utensils Used spoon; straw  -TM        Consistencies Trialed regular textures; pureed; thin liquids  -TM                  Respiratory    Respiratory Status WFL; room air  -TM                  Clinical Swallow Eval    Oral Prep Phase WFL  -TM        Oral Transit WFL  -TM        Oral Residue WFL  -TM        Pharyngeal Phase no overt signs/symptoms of pharyngeal impairment  -TM        Clinical Swallow Evaluation Summary Bedside eval of pt.'s swallow at bedside and informal communication assessment completed.  Pt. was seated upright in bed with her  present.  Pt. was in good spirits and voiced complaints only of R sided weakness and some slurring of speech.  Oral mech was remarkable for mild dysarthria of speech due to oral weakness, but intelligibility not significantly affected.  Pt. exibited good awareness of deficits as well.  She was given trials of regular solid, puree, and thin liquid.  Oral phase was WFL with all trials.  Mild oral strength deficits did not adversely affect bolus prep, manipulation, transit, or oral clearance with any trial.  No overt s/s aspiration with any trial.  Pt. denied difficulty with po.  Regarding commuication, auditory comprehension was WFL, verbal expression was WFL, and cognition was WFL.  No appreciable deficits  exhibited that would adversely affect swallowing or commuication for ADL's or social needs.  Pt. will be given instruction and education in oral motor exercises to improve R labial strength.  Recommend:  1. regular diet with thin liq as herve,  2. meds whole with thin liq as herve,  3. aspiration precautions.  -                  SLP Evaluation Clinical Impression    SLP Swallowing Diagnosis swallow WFL  -TM        Functional Impact no impact on function  -                  Recommendations    Therapy Frequency (Swallow) PRN  plan to give only exercise program with instruction for R labial strengthening  -        SLP Diet Recommendation regular textures; thin liquids  -        Recommended Precautions and Strategies upright posture during/after eating; general aspiration precautions  -        Oral Care Recommendations Toothbrush  -TM        SLP Rec. for Method of Medication Administration meds whole  -TM        Monitor for Signs of Aspiration yes; notify SLP if any concerns; cough; gurgly voice; throat clearing; right lower lobe infiltrates  -TM        Anticipated Discharge Disposition (SLP) unknown  -              User Key  (r) = Recorded By, (t) = Taken By, (c) = Cosigned By    Initials Name Effective Dates     Lu Barcenas 06/16/21 -                 EDUCATION  The patient has been educated in the following areas:   Communication Impairment Dysphagia (Swallowing Impairment).              Time Calculation:    Time Calculation- SLP     Row Name 02/10/22 1211             Time Calculation- SLP    SLP Start Time 0945  -      SLP Received On 02/10/22  -              Untimed Charges    SLP Eval/Re-eval  ST Eval Oral Pharyng Swallow - 62163; ST Eval Speech and Production w/ Language - 40390  -            User Key  (r) = Recorded By, (t) = Taken By, (c) = Cosigned By    Initials Name Provider Type     Lu Barcenas Speech and Language Pathologist                Therapy Charges for Today     Code  Description Service Date Service Provider Modifiers Qty    [id number]  ST EVAL ORAL PHARYNG SWALLOW 4 2/10/2022 Lu Barcenas 1    [id number]  ST EVAL SPEECH AND PROD W LANG  4 2/10/2022 Lu Barcenas 1               Lu Barcenas  2/10/2022

## 2022-02-10 NOTE — PLAN OF CARE
Goal Outcome Evaluation:           Progress: no change  Outcome Summary: Bedside eval of pt.'s swallow at bedside and informal communication assessment completed.  Pt. was seated upright in bed with her  present.  Pt. was in good spirits and voiced complaints only of R sided weakness and some slurring of speech.  Oral mech was remarkable for mild dysarthria of speech due to oral weakness, but intelligibility not significantly affected.  Pt. exibited good awareness of deficits as well.  She was given trials of regular solid, puree, and thin liquid.  Oral phase was WFL with all trials.  Mild oral strength deficits did not adversely affect bolus prep, manipulation, transit, or oral clearance with any trial.  No overt s/s aspiration with any trial.  Pt. denied difficulty with po.  Regarding commuication, auditory comprehension was WFL, verbal expression was WFL, and cognition was WFL.  No appreciable deficits exhibited that would adversely affect swallowing or commuication for ADL's or social needs.  Pt. will be given instruction and education in oral motor exercises to improve R labial strength.  Recommend:  1. regular diet with thin liq as herve,  2. meds whole with thin liq as herve,  3. aspiration precautions.

## 2022-02-10 NOTE — PROGRESS NOTES
[facility]    Name:  Deborah Jones   Age:  71 y.o.  Sex:  female  :  1950  MRN:  [id number]   Visit Number:  [id number]  Admission Date:  2022  Date Of Service:  02/10/22  Primary Care Physician:  Joseluis Keyes MD     LOS: 1 day :    Chief Complaint:      Follow-up CVA    Subjective:    Patient seen at bedside.  Discussed her MRI results.  Discussed teleneurology recommendations.  The vast majority of patient's deficits have already improved.  Other work-up has been stable.  She denies any prior history of stroke.  Denies any known history of cardiovascular disease.    Hospital Course:    Patient is a 71-year-old female with history of type 2 diabetes, presented from home with complaints of apparent right sided facial droop, lower extremity weakness that had started at 1430 on 2022.  Initial work-up in the emergency room was concerning for CVA, with CT head without contrast demonstrating a left basal ganglia lacunar infarct acute versus subacute.  CT cerebral perfusion unremarkable.  CT angio head neck unremarkable.  Labs were otherwise largely unremarkable.  She was given loading dose of Plavix and 325 mg of aspirin and teleneurologist consulted on presentation.  Patient was admitted for observation and work-up.    MRI of the brain today consistent with acute stroke in left basal ganglia region.  Seen by speech therapy and cleared for diet.  Teleneurology recommending 24-hour bedrest at this time.  Continue on aspirin, Plavix, Lipitor.  Diabetic/cardiac diet.  2D echo.    Review of Systems:     All systems were reviewed and negative except as mentioned in subjective, assessment and plan.    Vital Signs:    Temp:  [97.6 °F (36.4 °C)-98.3 °F (36.8 °C)] 97.9 °F (36.6 °C)  Heart Rate:  [55-97] 55  Resp:  [17-20] 17  BP: (118-142)/(63-98) 133/83    Intake and output:    I/O last 3 completed shifts:  In: 1462.5 [I.V.:1462.5]  Out: -   I/O this shift:  In:  360 [P.O.:360]  Out: -     Physical Examination:    General Appearance:  Alert and cooperative.  No acute distress   Head:  Atraumatic and normocephalic.   Eyes: Conjunctivae and sclerae normal, no icterus. No pallor.   Throat: No oral lesions, no thrush, oral mucosa moist.   Neck: Supple, trachea midline, no thyromegaly.   Lungs:   Breath sounds heard bilaterally equally.  No wheezing or crackles. No Pleural rub or bronchial breathing.   Heart:  Normal S1 and S2, no murmur, no gallop, no rub. No JVD.   Abdomen:   Normal bowel sounds, no masses, no organomegaly. Soft, nontender, nondistended, no rebound tenderness.   Extremities: Supple, no edema, no cyanosis, no clubbing.   Skin: No bleeding or rash.   Neurologic: Alert and oriented x 3.  There is facial droop of the right, sensation decreased, mild dysarthria noted.  Mild motor deficit of the right upper extremity compared to left with drift     Laboratory results:    Results from last 7 days   Lab Units 02/10/22  0545 02/09/22  1654   SODIUM mmol/L 140 138   POTASSIUM mmol/L 4.2 4.3   CHLORIDE mmol/L 110* 101   CO2 mmol/L 20.8* 23.1   BUN mg/dL 16 19   CREATININE mg/dL 1.10* 1.21*   CALCIUM mg/dL 8.9 9.8   BILIRUBIN mg/dL  --  0.2   ALK PHOS U/L  --  111   ALT (SGPT) U/L  --  17   AST (SGOT) U/L  --  20   GLUCOSE mg/dL 145* 130*     Results from last 7 days   Lab Units 02/10/22  0545 02/09/22  1654   WBC 10*3/mm3 5.59 6.26   HEMOGLOBIN g/dL 12.1 12.5   HEMATOCRIT % 37.9 37.6   PLATELETS 10*3/mm3 360 395     Results from last 7 days   Lab Units 02/09/22  1654   INR  0.84*     Results from last 7 days   Lab Units 02/09/22  1654   TROPONIN T ng/mL <0.010             I have reviewed the patient's laboratory results.    Radiology results:    Adult Transthoracic Echo Complete W/ Cont if Necessary Per Protocol    Result Date: 2/10/2022  1.  Normal left ventricular size and systolic function, LVEF 65-70%. 2.  Abnormal LV diastolic filling pattern consistent with grade 2  diastolic dysfunction. 3.  Moderately increased left atrial volume index. 4.  Normal right ventricular size and systolic function. 5.  Mild tricuspid regurgitation, RVSP 27 mmHg. 6.  Negative bubble study with no evidence of intracardiac or intrapulmonary shunt.    CT Angiogram Neck    Result Date: 2/9/2022  FINAL REPORT TECHNIQUE: Thin section axial CT images were performed through the neck after IV contrast.  3-D reconstruction images were submitted. This study was performed with techniques to keep radiation doses as low as reasonably achievable (ALARA). Individualized dose reduction techniques using automated exposure control or adjustment of mA and/or kV according to the patient's size were employed. CLINICAL HISTORY: stroke FINDINGS: CTA neck:  NASCET technique utilized for stenosis evaluation. Right carotid: No significant stenosis is seen of the cervical, or internal carotid artery.  Left carotid: No significant stenosis is seen of the cervical common or internal carotid artery.  Vertebrals: The right vertebral artery is dominant and the left vertebral artery is diminutive but patent.  No significant stenosis is present.     Impression: No significant stenosis or occlusion of the cervical common or internal carotid arteries. Authenticated by Emil Urias MD on 02/09/2022 06:23:45 PM    MRI Brain Without Contrast    Result Date: 2/10/2022  MRI BRAIN WO CONTRAST-  HISTORY: Stroke, follow up; I63.9-Cerebral infarction, unspecified  TECHNIQUE: Multiplanar imaging was performed of the brain without gadolinium infusion.  Diffusion sequences show increased signal compatible with restricted diffusion within the left posterior basal ganglia. The largest area of involvement is up to 12 mm which corresponds to the CT abnormality. Appearance is compatible with an acute or early subacute lacunar infarct. No areas of restricted diffusion are seen involving the cortical tissues.  Mild edema is seen corresponding to the  lacunar infarct within the left basal ganglia. There is no hemorrhage.  Mild periventricular white matter signal changes are seen compatible with mild chronic microvascular disease.  Ventricles are normal. No mass or mass effect is present. No extra-axial abnormal findings are identified.      Impression: 1. Acute or early subacute lacunar infarct of the left basal ganglia corresponding to CT abnormality. No associated hemorrhage or significant mass effect. 2. No evidence of cortical infarct.  This report was signed and finalized on 2/10/2022 8:59 AM by Noah Moon MD.    XR Chest 1 View    Result Date: 2/10/2022  PROCEDURE: XR CHEST 1 VW-  HISTORY: stroke , symptoms of CVA. Facial droop.  COMPARISON:  None  FINDINGS:  Portable view of the chest demonstrates the lungs to be grossly clear. There is no evidence of effusion, pneumothorax or other significant pleural disease. The mediastinum is unremarkable.  The heart size is normal.      Impression: Unremarkable portable chest.  This report was signed and finalized on 2/10/2022 7:37 AM by Noah Moon MD.    CT Angiogram Head    Result Date: 2/9/2022  FINAL REPORT TECHNIQUE: Multiple axial CT angiography images were performed from the foramen magnum to the vertex before and during IV contrast administration. This study was performed with techniques to keep radiation doses as low as reasonably achievable (ALARA). Individualized dose reduction techniques using automated exposure control or adjustment of mA and/or kV according to the patient's size were employed. CLINICAL HISTORY: stroke FINDINGS: No acute intracranial hemorrhage or large acute cortical infarct.  The brain volume is normal for patient's age. Ventricles are normal in size and configuration.  No midline shift.  The basal cisterns are patent.  No skull fracture.  The visualized paranasal sinuses and mastoid air cells are clear. CTA HEAD: No evidence of vascular injury, hemodynamically significant  stenosis or major vessel occlusion.     Impression: No acute intracranial hemorrhage or large acute cortical infarct. Authenticated by Emil Urias MD on 02/09/2022 06:22:28 PM    CT Head Without Contrast Stroke Protocol    Result Date: 2/9/2022  PROCEDURE: CT HEAD WO CONTRAST STROKE PROTOCOL-  HISTORY: Neuro deficit, acute, stroke suspected  TECHNIQUE: Noncontrast exam  FINDINGS: Diminished attenuation is noted of the left basal ganglia with an oval hypodensity measuring 13 x 8 mm. This is suspicious for an acute or early subacute large lacunar infarct. Remaining parenchyma is homogeneous without evidence of hemorrhage, mass effect or edema. No extra-axial abnormality is noted. Ventricles and cisterns appear normal.  The visualized sinuses, orbits and petrous temporal bones appear unremarkable.      Impression: Left basal ganglia lacunar infarcts likely acute or subacute in age without hemorrhage   This study was performed with techniques to keep radiation doses as low as reasonably achievable (ALARA). Individualized dose reduction techniques using automated exposure control or adjustment of vA and/or kV according to the patient size were employed.  This report was signed and finalized on 2/9/2022 4:31 PM by Noah Moon MD.    CT CEREBRAL PERFUSION WITH & WITHOUT CONTRAST    Result Date: 2/9/2022  FINAL REPORT TECHNIQUE: Multiple axial CT images were performed from the foramen magnum to the vertex.  Perfusion images were also performed.  Coronal reformatted images were reconstructed from axial data set.This study was performed with techniques to keep radiation doses as low as reasonably achievable, (ALARA). Individualized dose reduction techniques using automated exposure control or adjustment of mA and/or kV according to the patient's size were employed. CLINICAL HISTORY: stroke symptoms FINDINGS: CT PERFUSION HEAD  FINDINGS: There is relatively symmetric cerebral perfusion without significant cerebral blood  flow or blood volume or abnormalities specific for significant core infarct.  There is no significant prolongation of the time to max specific for ischemic penumbra.     Impression: No specific CT evidence of significant core infarct. Authenticated by Emil Urias MD on 02/09/2022 06:22:01 PM    I have reviewed the patient's radiology reports.    Medication Review:     I have reviewed the patient's active and prn medications.     Problem List:      Cerebrovascular accident (CVA) ([facility])      Assessment:    1. Basal ganglia stroke, present on admission  2. Type 2 diabetes  3. Hyperlipidemia  4. Mild renal insufficiency    Plan:    Patient stable.  Blood pressure is appropriate.  There was grade 2 diastolic dysfunction on echo imaging with moderately increased left atrial volume index.  Negative bubble study.  Kidney function stable.  Continue with fluids for now.  Continue with high intensity statin.  Aspirin and Plavix per neurology recommendations.  Will allow PT and OT to evaluate tomorrow after discussion with neurology.  Monitor for any changes in status.  Would doubt patient will benefit from inpatient rehab, however will know more after PT evaluation.  Continue telemetry monitoring.  Can start VT prophylaxis with heparin today    DVT Prophylaxis: Heparin  Code Status: Full  Diet: Carb consistent/cardiac  Discharge Plan: 1 to 2 days likely to home    Belle Haque DO  02/10/22  14:42 EST    Dictated utilizing Dragon dictation.

## 2022-02-10 NOTE — PLAN OF CARE
Goal Outcome Evaluation:  Plan of Care Reviewed With: patient           Outcome Summary: New Admission

## 2022-02-10 NOTE — PLAN OF CARE
Goal Outcome Evaluation:  Plan of Care Reviewed With: patient, daughter        Progress: improving     Right facial droop and slurred speech noted. RLE slightly weaker than left. See new orders. MD request patient stay on strict bedrest and to remain flat unless meal time and then may remain upright for 30 minutes while eating. Continue to monitor patient progress.

## 2022-02-10 NOTE — DISCHARGE PLACEMENT REQUEST
"Rehab Referral  ; Pat Borges [phone]      Sharda Ochoa (71 y.o. Female)             Date of Birth Social Security Number Address Home Phone MRN    1950  [address] [place] [id number] [phone] [id number]    Sabianist Marital Status             Unknown        Admission Date Admission Type Admitting Provider Attending Provider Department, Room/Bed    22 Emergency Max Araujo MD Karrick, Shandy Marcus, DO [facility] MED SURG  3, 304/1    Discharge Date Discharge Disposition Discharge Destination                         Attending Provider: Belle Haque DO    Allergies: No Known Allergies    Isolation: None   Infection: None   Code Status: CPR   Advance Care Planning Activity    Ht: 152.4 cm (60\")   Wt: 56.8 kg (125 lb 3.5 oz)    Admission Cmt: None   Principal Problem: None                Active Insurance as of 2022     Primary Coverage     Payor Plan Insurance Group Employer/Plan Group    [facility]      Payor Plan Address Payor Plan Phone Number Payor Plan Fax Number Effective Dates    [address] [address] [phone]  2022 - None Entered    [facility] [address]       Subscriber Name Subscriber Birth Date Member ID       SHARDA OCHOA 1950 [id number]                 Emergency Contacts      (Rel.) Home Phone Work Phone Mobile Phone    DAHIANA SPENCER (Spouse) [phone] -- --               History & Physical      Mxa Araujo MD at 22            [facility] HOSPITALIST   HISTORY AND PHYSICAL      Name:  Sharda Ochoa   Age:  71 y.o.  Sex:  female  :  1950  MRN:  [id number]   Visit Number:  [id number]  Admission Date:  2022  Date Of Service:  22  Primary Care Physician:  Provider, No Known    Chief Complaint:     R facial droop, RLE weakness    History Of Presenting Illness:      71AAF pmh non insulin dependent t2dm woke up " this am with heaviness in R leg that has since resolved. At around 2:30PM she developed R sided facial droop which prompted her to to present to ED. Since then R facial droop seems to be resolving. It is a/w dysarthria. No numbness/tingling. No change in vision. No h/o seizures or cva. No h/o heart disease. She takes a daily baby aspirin.    Initial vitals in ED:  02/09/22 1614 97.6 (36.4) 97 19 142/95 Sitting room air 99     Significant labs/imaging:  Trop <0.01, na 138, k 4.3, cr 1.21 (baseline unknown), wbc 6.26, hgb 12.5, plt 395  cxr without acute process (my read)  Cta head/neck no significant stenosis/occlusion  Ct head wo: Left basal ganglia lacunar infarcts likely acute or subacute  in age without hemorrhage  CT  Cerebral perfusion: No specific CT evidence of significant core infarct.  In the ED she was given 300mg plavix and 325mg aspirin.  Neurology recommended no TPA and MRI in the AM.    Review Of Systems:    All systems were reviewed and negative except as mentioned in history of presenting illness, assessment and plan.    Past Medical History:  t2dm    Past Surgical History: Patient  has no past surgical history on file.  Hysterectomy    Social History: Patient lives with  and mother-in-law. Smokes half a cigar daily. occassionally drinks alcoholic beverages     Family History: t2dm runs in family. Father had MI ~20 years ago    Allergies:      Patient has no known allergies.    Home Medications:    Prior to Admission Medications     Prescriptions Last Dose Informant Patient Reported? Taking?    glipizide (GLUCOTROL) 10 MG tablet   Yes Yes    Take 10 mg by mouth 2 (Two) Times a Day Before Meals.    metFORMIN (GLUCOPHAGE) 1000 MG tablet   Yes Yes    Take 1,000 mg by mouth 2 (Two) Times a Day With Meals.        ED Medications:    Medications   sodium chloride 0.9 % infusion (125 mL/hr Intravenous New Bag 2/9/22 0438)   sodium chloride 0.9 % flush 10 mL (has no administration in time range)  "  sodium chloride 0.9 % flush 10 mL (has no administration in time range)   atorvastatin (LIPITOR) tablet 80 mg (has no administration in time range)   heparin (porcine) 5000 UNIT/ML injection 5,000 Units (has no administration in time range)   aspirin chewable tablet 81 mg (has no administration in time range)     Or   aspirin suppository 300 mg (has no administration in time range)   clopidogrel (PLAVIX) tablet 75 mg (has no administration in time range)   acetaminophen (TYLENOL) tablet 650 mg (has no administration in time range)     Or   acetaminophen (TYLENOL) suppository 650 mg (has no administration in time range)   promethazine (PHENERGAN) tablet 12.5 mg (has no administration in time range)     Or   promethazine (PHENERGAN) suppository 12.5 mg (has no administration in time range)   aspirin chewable tablet 324 mg (324 mg Oral Given 2/9/22 1724)   clopidogrel (PLAVIX) tablet 300 mg (300 mg Oral Given 2/9/22 1724)   iopamidol (ISOVUE-300) 61 % injection 100 mL (100 mL Intravenous Given 2/9/22 1740)   iopamidol (ISOVUE-300) 61 % injection 50 mL (50 mL Intravenous Given 2/9/22 1740)     Vital Signs:  Temp:  [97.6 °F (36.4 °C)] 97.6 °F (36.4 °C)  Heart Rate:  [62-97] 62  Resp:  [19] 19  BP: (118-142)/(72-95) 118/72        02/09/22  1614   Weight: 54.4 kg (120 lb)     Body mass index is 24.24 kg/m².    Physical Exam:     Most recent vital Signs: /72   Pulse 62   Temp 97.6 °F (36.4 °C) (Oral)   Resp 19   Ht 149.9 cm (59\")   Wt 54.4 kg (120 lb)   SpO2 98%   BMI 24.24 kg/m²     General Appearance:  Alert and cooperative. NAD. Pleasant.   Head:  Atraumatic and normocephalic.   Eyes: Conjunctivae and sclerae normal, no icterus. No pallor.   Throat: No oral lesions, no thrush, oral mucosa moist.   Neck: Supple, trachea midline, no thyromegaly.   Lungs:   Breath sounds heard bilaterally equally.  No wheezing or crackles. No Pleural rub or bronchial breathing.   Heart:  Normal S1 and S2, no murmur, no " gallop, no rub.   Abdomen:   Normal bowel sounds, no masses, no organomegaly. Soft, nontender, nondistended, no rebound tenderness.   Extremities: Supple, no edema, no cyanosis, no clubbing.   Skin: No bleeding or rash.   Neurologic: +R facial droop, mild-moderate; other cranial nerves intact. Strength 5/5 in all 4 extremities. Sensation is grossly symmetric. FTN normal b/l.     Laboratory data:    I have reviewed the labs done in the emergency room.    Results from last 7 days   Lab Units 02/09/22  1654   SODIUM mmol/L 138   POTASSIUM mmol/L 4.3   CHLORIDE mmol/L 101   CO2 mmol/L 23.1   BUN mg/dL 19   CREATININE mg/dL 1.21*   CALCIUM mg/dL 9.8   BILIRUBIN mg/dL 0.2   ALK PHOS U/L 111   ALT (SGPT) U/L 17   AST (SGOT) U/L 20   GLUCOSE mg/dL 130*     Results from last 7 days   Lab Units 02/09/22  1654   WBC 10*3/mm3 6.26   HEMOGLOBIN g/dL 12.5   HEMATOCRIT % 37.6   PLATELETS 10*3/mm3 395     Results from last 7 days   Lab Units 02/09/22  1654   INR  0.84*     Results from last 7 days   Lab Units 02/09/22  1654   TROPONIN T ng/mL <0.010     Results from last 7 days   Lab Units 02/09/22  1654   PROBNP pg/mL 20.5                       Invalid input(s): USDES,  BLOODU, NITRITITE, BACT, EP    Pain Management Panel    There is no flowsheet data to display.             Radiology:    CT Angiogram Neck    Result Date: 2/9/2022  FINAL REPORT TECHNIQUE: Thin section axial CT images were performed through the neck after IV contrast.  3-D reconstruction images were submitted. This study was performed with techniques to keep radiation doses as low as reasonably achievable (ALARA). Individualized dose reduction techniques using automated exposure control or adjustment of mA and/or kV according to the patient's size were employed. CLINICAL HISTORY: stroke FINDINGS: CTA neck:  NASCET technique utilized for stenosis evaluation. Right carotid: No significant stenosis is seen of the cervical, or internal carotid artery.  Left carotid: No  significant stenosis is seen of the cervical common or internal carotid artery.  Vertebrals: The right vertebral artery is dominant and the left vertebral artery is diminutive but patent.  No significant stenosis is present.     No significant stenosis or occlusion of the cervical common or internal carotid arteries. Authenticated by Emil Urias MD on 02/09/2022 06:23:45 PM    CT Angiogram Head    Result Date: 2/9/2022  FINAL REPORT TECHNIQUE: Multiple axial CT angiography images were performed from the foramen magnum to the vertex before and during IV contrast administration. This study was performed with techniques to keep radiation doses as low as reasonably achievable (ALARA). Individualized dose reduction techniques using automated exposure control or adjustment of mA and/or kV according to the patient's size were employed. CLINICAL HISTORY: stroke FINDINGS: No acute intracranial hemorrhage or large acute cortical infarct.  The brain volume is normal for patient's age. Ventricles are normal in size and configuration.  No midline shift.  The basal cisterns are patent.  No skull fracture.  The visualized paranasal sinuses and mastoid air cells are clear. CTA HEAD: No evidence of vascular injury, hemodynamically significant stenosis or major vessel occlusion.     No acute intracranial hemorrhage or large acute cortical infarct. Authenticated by Emil Urias MD on 02/09/2022 06:22:28 PM    CT Head Without Contrast Stroke Protocol    Result Date: 2/9/2022  PROCEDURE: CT HEAD WO CONTRAST STROKE PROTOCOL-  HISTORY: Neuro deficit, acute, stroke suspected  TECHNIQUE: Noncontrast exam  FINDINGS: Diminished attenuation is noted of the left basal ganglia with an oval hypodensity measuring 13 x 8 mm. This is suspicious for an acute or early subacute large lacunar infarct. Remaining parenchyma is homogeneous without evidence of hemorrhage, mass effect or edema. No extra-axial abnormality is noted. Ventricles and cisterns  appear normal.  The visualized sinuses, orbits and petrous temporal bones appear unremarkable.      Left basal ganglia lacunar infarcts likely acute or subacute in age without hemorrhage   This study was performed with techniques to keep radiation doses as low as reasonably achievable (ALARA). Individualized dose reduction techniques using automated exposure control or adjustment of vA and/or kV according to the patient size were employed.  This report was signed and finalized on 2/9/2022 4:31 PM by Noah Moon MD.    CT CEREBRAL PERFUSION WITH & WITHOUT CONTRAST    Result Date: 2/9/2022  FINAL REPORT TECHNIQUE: Multiple axial CT images were performed from the foramen magnum to the vertex.  Perfusion images were also performed.  Coronal reformatted images were reconstructed from axial data set.This study was performed with techniques to keep radiation doses as low as reasonably achievable, (ALARA). Individualized dose reduction techniques using automated exposure control or adjustment of mA and/or kV according to the patient's size were employed. CLINICAL HISTORY: stroke symptoms FINDINGS: CT PERFUSION HEAD  FINDINGS: There is relatively symmetric cerebral perfusion without significant cerebral blood flow or blood volume or abnormalities specific for significant core infarct.  There is no significant prolongation of the time to max specific for ischemic penumbra.     No specific CT evidence of significant core infarct. Authenticated by Emil Urias MD on 02/09/2022 06:22:01 PM      Assessment/Plan:    Acute/subacute CVA  -appreciate neurology  -continue aspirin, plavix and lipitor  -tele  -echo  -brain mri wo  -pt/ot/slp  -permissive HTN     tho vs ckd  -ns at 125ml/hr  -am bmp    t2dm  -low dose ssi  -a1c, lipd panel    Risk Assessment: moderate  DVT Prophylaxis: heparin subcu  Code Status: full  Diet: npo until cleared by slp      Max Araujo MD  02/09/22  19:57 EST      Electronically signed by Max Araujo MD at  02/09/22 2117          Emergency Department Notes      Radha Barboza MD at 02/09/22 1655          TRIAGE CHIEF COMPLAINT:     Nursing and triage notes reviewed    Chief Complaint   Patient presents with   • Facial Droop      HPI: Deborah Jones is a 71 y.o. female who presents to the emergency department complaining of right-sided facial droop and right-sided leg weakness.  Patient states that around 3 to 3-1/2 hours prior to arrival she noticed some heaviness in her right lower extremity.  She was still able to move it and walk but states it was harder than normal.  She states this lasted approximately an hour and has resolved.  She states there is still a slight odd sensation but does not feel weak currently.  She states approximately an hour and half prior to arrival she noticed some drooping on the right side of her face.  She denies any weakness in her right upper extremity.  She denies any symptoms in her left upper extremity, the left side of her face, her left lower extremity.  She denies any history of stroke.  She does have a history of diabetes.  She denies chest pain, shortness of breath, palpitations.  No fevers or chills.    REVIEW OF SYSTEMS: All other systems reviewed and are negative     PAST MEDICAL HISTORY:   No past medical history on file.     FAMILY HISTORY:   No family history on file.     SOCIAL HISTORY:   Social History     Socioeconomic History   • Marital status:         SURGICAL HISTORY:   No past surgical history on file.     CURRENT MEDICATIONS:      Medication List      ASK your doctor about these medications    glipizide 10 MG tablet  Commonly known as: GLUCOTROL     metFORMIN 1000 MG tablet  Commonly known as: GLUCOPHAGE             ALLERGIES: Patient has no known allergies.     PHYSICAL EXAM:   VITAL SIGNS:   Vitals:    02/09/22 1614   BP: 142/95   Pulse: 97   Resp: 19   Temp: 97.6 °F (36.4 °C)   SpO2: 99%      CONSTITUTIONAL: Awake, oriented, appears nontoxic    HENT: Atraumatic, normocephalic, oral mucosa pink and moist, airway patent. Nares patent without drainage. External ears normal.   EYES: Conjunctivae clear, EOMI, PERRL   NECK: Trachea midline, nontender, supple   CARDIOVASCULAR: Normal heart rate, Normal rhythm, No murmurs, rubs, gallops   PULMONARY/CHEST: Clear to auscultation, no rhonchi, wheezes, or rales. Symmetrical breath sounds.  ABDOMINAL: Nondistended, soft, nontender - no rebound or guarding.  NEUROLOGIC: There is no noticeable weakness of the right upper or right lower extremities when compared to the left.  There is mild facial droop on the right side of patient's face.  There may be subtle involvement in patient's right eye but no obvious loss of forehead creases.  Pupils are equal round and reactive to light.  EXTREMITIES: No clubbing, cyanosis, or edema   SKIN: Warm, Dry, No erythema, No rash     ED COURSE / MEDICAL DECISION MAKING:   Deborah Jones is a 71 y.o. female who presents to the emergency department for evaluation of symptoms concerning for stroke.  Patient is nondistressed on arrival and vital signs are stable.  Examination does reveal mild right-sided facial droop.  The remainder of the neurologic exam is unremarkable.  Patient sent over for CT stroke protocol immediately prior to my evaluating the patient.    I evaluated patient immediately after returning from CT stroke protocol.  No facial droop noted at that time.    CT scan has been interpreted by the time I returned to the computer, this reveals acute or subacute basal ganglia stroke.  Stroke neurologist is currently in conference with another patient in the emergency department and we will consult him immediately after he has done evaluating that patient.    After speaking to the neurologist he reviewed the CT scan as well as the report provided by the radiologist and agreed with this assessment of a stroke.  He recommended obtaining CT angiograms and an MRI and admitting the  patient to the hospitalist service for further treatment and evaluation.  He recommended aspirin and Plavix.  Did not recommend TPA at this time.    EKG interpreted by me reveals sinus rhythm with rate of 76 bpm.  There is locators voltage in chest leads.  No obvious acute ischemic findings.  This is an atypical appearing EKG.    Patient's laboratory tests are largely unremarkable including white blood cell count, cardiac enzymes.    We will admit to the hospitalist service for MRI and further evaluation.    Critical care time for this encounter excluding any procedure: 30 minutes    DECISION TO DISCHARGE/ADMIT: see ED care timeline     FINAL IMPRESSION:   1 --CVA  2 --   3 --     Electronically signed by: Radha Barboza MD, 2/9/2022 16:55 EST       Radha Barboza MD  02/09/22 1915      Electronically signed by Radha Barboza MD at 02/09/22 1915     Ginny Wilson at 02/09/22 1922        Patient will be going to  [address] [facility] notified.      Ginny Wilson  02/09/22 1922      Electronically signed by Ginny Wilson at 02/09/22 1922         Current Facility-Administered Medications   Medication Dose Route Frequency Provider Last Rate Last Admin   • acetaminophen (TYLENOL) tablet 650 mg  650 mg Oral Q4H PRN Max Araujo MD        Or   • acetaminophen (TYLENOL) suppository 650 mg  650 mg Rectal Q4H PRN Max Araujo MD       • aspirin chewable tablet 81 mg  81 mg Oral Daily Max Araujo MD   81 mg at 02/10/22 1130    Or   • aspirin suppository 300 mg  300 mg Rectal Daily Max Araujo MD       • atorvastatin (LIPITOR) tablet 80 mg  80 mg Oral Nightly Max Araujo MD       • clopidogrel (PLAVIX) tablet 75 mg  75 mg Oral Daily Max Araujo MD   75 mg at 02/10/22 1130   • dextrose (D50W) (25 g/50 mL) IV injection 25 g  25 g Intravenous Q15 Min PRN Max Araujo MD       • dextrose (GLUTOSE) oral gel 1 tube  1 tube Oral Q15 Min PRN Max Araujo MD       • glucagon (human recombinant) (GLUCAGEN DIAGNOSTIC) injection 1 mg  1 mg  Subcutaneous PRN Max Araujo MD       • heparin (porcine) 5000 UNIT/ML injection 5,000 Units  5,000 Units Subcutaneous Q12H Max Araujo MD   5,000 Units at 02/10/22 0900   • insulin aspart (novoLOG) injection 0-7 Units  0-7 Units Subcutaneous TID AC Max Araujo MD       • promethazine (PHENERGAN) tablet 12.5 mg  12.5 mg Oral Q6H PRN Max Araujo MD        Or   • promethazine (PHENERGAN) suppository 12.5 mg  12.5 mg Rectal Q6H PRN Max Araujo MD       • sodium chloride 0.9 % flush 10 mL  10 mL Intravenous Q12H Max Araujo MD   10 mL at 02/10/22 0900   • sodium chloride 0.9 % flush 10 mL  10 mL Intravenous PRN Max Araujo MD       • sodium chloride 0.9 % infusion  125 mL/hr Intravenous Continuous Radha Barboza  mL/hr at 02/10/22 0335 125 mL/hr at 02/10/22 0335     Physician Progress Notes (last 24 hours)  Notes from 22 1307 through 02/10/22 1307   No notes of this type exist for this encounter.            Consult Notes (last 48 hours)      Seth Perez MD at 02/10/22 1147      Consult Orders    1. Inpatient Neurology Consult Stroke [[id number]] ordered by Max Araujo MD at 22               Stroke Consult Note    Patient Name: Deborah Jones   MRN: [id number]  Age: 71 y.o.  Sex: female  : 1950    Primary Care Physician: Provider, No Known  Referring Physician:  No ref. provider found    Handedness: Right  Race: Black    Chief Complaint/Reason for Consultation: Right-sided weakness    Subjective .  HPI: 71-year-old right-handed black female with known diagnosis of hypertension, diabetes, smoking cigars, who comes in with acute onset of right-sided weakness starting at 2:30 PM yesterday.  Patient was out of the window for TPA.  Patient noted to have mild right hemiparesis and right facial droop and mild dysarthria.  No history of stroke in the past.  Patient is on aspirin 81 mg daily, although she does not take it every day.    Last Known Normal Date/Time: 2:30 PM to 2022 EST      Review of Systems   Constitutional: Negative.    HENT: Negative.    Eyes: Negative.    Respiratory: Negative.    Cardiovascular: Negative.    Gastrointestinal: Negative.    Endocrine: Negative.    Genitourinary: Negative.    Musculoskeletal: Negative.    Skin: Negative.    Allergic/Immunologic: Negative.    Psychiatric/Behavioral: Negative.       No past medical history on file.  No past surgical history on file.  No family history on file.  Social History     Socioeconomic History   • Marital status:    Tobacco Use   • Smoking status: Current Every Day Smoker     Types: Cigars     No Known Allergies  Prior to Admission medications    Medication Sig Start Date End Date Taking? Authorizing Provider   aspirin 81 MG chewable tablet Chew 81 mg Daily.   Yes Dali Manuel MD   glipizide (GLUCOTROL) 10 MG tablet Take 10 mg by mouth Daily.   Yes Dali Manuel MD   metFORMIN (GLUCOPHAGE) 1000 MG tablet Take 1,000 mg by mouth Daily.   Yes Dali Manuel MD   multivitamin with minerals tablet tablet Take 1 tablet by mouth Daily.   Yes Dali Manuel MD   Omega-3 Fatty Acids (fish oil) 1000 MG capsule capsule Take 1,000 mg by mouth Daily With Breakfast.   Yes Dali Manuel MD            Objective     Temp:  [97.6 °F (36.4 °C)-98.3 °F (36.8 °C)] 97.9 °F (36.6 °C)  Heart Rate:  [55-97] 55  Resp:  [17-20] 17  BP: (118-142)/(63-98) 133/83  Neurological Exam  Mental Status  Awake, alert and oriented to person, place and time.Alert. Mild dysarthria present. Language is fluent with no aphasia. Attention and concentration are normal.    Cranial Nerves  CN II: Visual fields full to confrontation.  CN III, IV, VI: Extraocular movements intact bilaterally. Pupils equal round and reactive to light bilaterally.  CN V: Facial sensation is normal.  CN VII: Full and symmetric facial movement.  CN VIII: Equal hearing.  CN IX, X: Palate elevates symmetrically. Normal gag reflex.  CN XI: Shoulder  shrug strength is normal.  CN XII: Tongue midline without atrophy or fasciculations.    Motor  Normal muscle bulk throughout. No fasciculations present.  RUE/RLE- drift noted.    Sensory  Light touch is normal in upper and lower extremities.     Reflexes  Not assessed.    Coordination  No dysmetria.    Gait  Not assesed.      Physical Exam  Vitals and nursing note reviewed.   Constitutional:       Appearance: Normal appearance.   HENT:      Head: Normocephalic and atraumatic.      Mouth/Throat:      Mouth: Mucous membranes are moist.      Pharynx: Oropharynx is clear.   Eyes:      Extraocular Movements: Extraocular movements intact.      Conjunctiva/sclera: Conjunctivae normal.      Pupils: Pupils are equal, round, and reactive to light.   Cardiovascular:      Rate and Rhythm: Normal rate and regular rhythm.   Pulmonary:      Effort: Pulmonary effort is normal. No respiratory distress.   Musculoskeletal:      Cervical back: Normal range of motion and neck supple.   Neurological:      Mental Status: She is alert.      Cranial Nerves: Dysarthria present.   Psychiatric:         Mood and Affect: Mood normal.         Behavior: Behavior normal.         Acute Stroke Data    IV Thrombolytic (TPA/Tenecteplase) Inclusion / Exclusion Criteria    Time: 11:47 EST  Person Administering Scale: Seth Perez MD    Inclusion Criteria  [x]   18 years of age or greater   []   Onset of symptoms < 4.5 hours before beginning treatment (stroke onset = time patient was last seen well or without symptoms).   [x]   Diagnosis of acute ischemic stroke causing measurable disabling deficit (Complete Hemianopia, Any Aphasia, Visual or Sensory Extinction, Any weakness limiting sustained effort against gravity)   []   Any remaining deficit considered potentially disabling in view of patient and practitioner   Exclusion criteria (Do not proceed with Alteplase if any are checked under exclusion criteria)  [x]   Onset unknown or GREATER than 4.5  hours   []   ICH on CT/MRI   []   CT demonstrates hypodensity representing acute or subacute infarct   []   Significant head trauma or prior stroke in the previous 3 months   []   Symptoms suggestive of subarachnoid hemorrhage   []   History of un-ruptured intracranial aneurysm GREATER than 10 mm   []   Recent intracranial or intraspinal surgery within the last 3 months   []   Arterial puncture at a non-compressible site in the previous 7 days   []   Active internal bleeding   []   Acute bleeding tendency   []   Platelet count LESS than 100,000 for known hematological diseases such as leukemia, thrombocytopenia or chronic cirrhosis   []   Current use of anticoagulant with INR GREATER than 1.7 or PT GREATER than 15 seconds, aPTT GREATER than 40 seconds   []   Heparin received within 48 hours, resulting in abnormally elevated aPTT GREATER than upper limit of normal   []   Current use of direct thrombin inhibitors or direct factor Xa inhibitors in the past 48 hours   []   Elevated blood pressure refractory to treatment (systolic GREATER than 185 mm/Hg or diastolic  GREATER than 110 mm/Hg   []   Suspected infective endocarditis and aortic arch dissection   []   Current use of therapeutic treatment dose of low-molecular-weight heparin (LMWH) within the previous 24 hours   []   Structural GI malignancy or bleed   Relative exclusion for all patients  []   Only minor nondisabling symptoms   []   Pregnancy   []   Seizure at onset with postictal residual neurological impairments   []   Major surgery or previous trauma within past 14 days   []   History of previous spontaneous ICH, intracranial neoplasm, or AV malformation   []   Postpartum (within previous 14 days)   []   Recent GI or urinary tract hemorrhage (within previous 21 days)   []   Recent acute MI (within previous 3 months)   []   History of unruptured intracranial aneurysm LESS than 10 mm   []   History of ruptured intracranial aneurysm   []   Blood glucose LESS  than 50 mg/dL (2.7 mmol/L)   []   Dural puncture within the last 7 days   []   Known GREATER than 10 cerebral microbleeds   Additional exclusions for patients with symptoms onset between 3 and 4.5 hours.  []   Age > 80.   []   On any anticoagulants regardless of INR  >>> Warfarin (Coumadin), Heparin, Enoxaparin (Lovenox), fondaparinux (Arixtra), bivalirudin (Angiomax), Argatroban, dabigatran (Pradaxa), rivaroxaban (Xarelto), or apixaban (Eliquis)   []   Severe stroke (NIHSS > 25).   []   History of BOTH diabetes and previous ischemic stroke.   []   The risks and benefits have been discussed with the patient or family related to the administration of IV alteplase for stroke symptoms.   []   I have discussed and reviewed the patient's case and imaging with the attending prior to IV Thrombolytic (TPA/Tenecteplase).    Time Thrombolytic administered       Hospital Meds:  Scheduled- aspirin, 81 mg, Oral, Daily   Or  aspirin, 300 mg, Rectal, Daily  atorvastatin, 80 mg, Oral, Nightly  clopidogrel, 75 mg, Oral, Daily  heparin (porcine), 5,000 Units, Subcutaneous, Q12H  insulin aspart, 0-7 Units, Subcutaneous, TID AC  sodium chloride, 10 mL, Intravenous, Q12H      Infusions- sodium chloride, 125 mL/hr, Last Rate: 125 mL/hr (02/10/22 0335)       PRNs- •  acetaminophen **OR** acetaminophen  •  dextrose  •  dextrose  •  glucagon (human recombinant)  •  promethazine **OR** promethazine  •  sodium chloride    Functional Status Prior to Current Stroke/[place] Score: 0    NIH Stroke Scale  Time: 11:47 EST  Person Administering Scale: Seth Perez MD    1a  Level of consciousness: 0=alert; keenly responsive   1b. LOC questions:  0=Performs both tasks correctly   1c. LOC commands: 0=Performs both tasks correctly   2.  Best Gaze: 0=normal   3.  Visual: 0=No visual loss   4. Facial Palsy: 2=Partial paralysis (total or near total paralysis of the lower face)   5a.  Motor left arm: 0=No drift, limb holds 90 (or 45) degrees for full 10  seconds   5b.  Motor right arm: 1=Drift, limb holds 90 (or 45) degrees but drifts down before full 10 seconds: does not hit bed   6a. motor left le=No drift, limb holds 90 (or 45) degrees for full 10 seconds   6b  Motor right le=Drift, limb holds 90 (or 45) degrees but drifts down before full 10 seconds: does not hit bed   7. Limb Ataxia: 0=Absent   8.  Sensory: 0=Normal; no sensory loss   9. Best Language:  0=No aphasia, normal   10. Dysarthria: 1=Mild to moderate, patient slurs at least some words and at worst, can be understood with some difficulty   11. Extinction and Inattention: 0=No abnormality    Total:   5       Results Reviewed:  I have personally reviewed current lab, radiology, and data  CT head shows hypodensity in the left basal ganglia region.  CT angiogram of head and neck shows no significant stenosis/occlusion.  CT perfusion looks okay.  MRI brain shows acute stroke in the left basal ganglia region in the posterior limb of internal capsule, in the anterior choroidal territory.  Mild white matter disease, no hemorrhage  A1c 7.8, , total cholesterol 232 and triglyceride of 118.             Assessment/Plan:      1. 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  Plan to continue her on dual antiplatelets, she was loaded with Plavix 300 mg yesterday, will continue with 75 mg today, aspirin 81 mg daily and Lipitor 80 mg daily for secondary stroke prevention.  Keep her strict bedrest, continue IV fluids at 125 cc an hour.  Allow for permissive hypertension up to systolic blood pressure of 200.  2. Essential hypertension.  Allow for permissive hypertension for next 24 hours, can start normalizing blood pressure from tomorrow.  Long-term blood pressure goals of less than 140.  3. Diabetes mellitus type 2 uncontrolled with hyperglycemia.  Her A1c is 7.8, goal of less than seven.  Avoid hypoglycemia.  Maintain normoglycemia  4. Mixed hyperlipidemia.  Continue full dose of  statins.  5. Smoking.  Encouraged her to quit smoking cigars.  6. Keep her strict bedrest with head of bed flat today.  Okay to raise it up while eating.  7. Has clears bedside swallow evaluation.  Healthy heart/diabetic diet.    Case was discussed with patient, her daughter, nursing and the primary team.  Thank you for the consult.          Seth Perez MD  February 10, 2022  11:47 EST    Verbal consent taken.  Patient agreeable to be seen via telemedicine.    This was an audio and video enabled telemedicine encounter.              Electronically signed by Seth Perez MD at 02/10/22 1155          Physical Therapy Notes (last 24 hours)      Alyssa Thompson, PT at 02/10/22 1105  Version 1 of 1       Hold PT eval per neuro; strict bedrest with HOB flat for 24 hours per patient/family report. PT will follow up with patient tomorrow and proceed with eval as tolerated and appropriate.     Electronically signed by Alyssa Thompson, PT at 02/10/22 1219          Occupational Therapy Notes (last 24 hours)      Alia Santoyo OT at 02/10/22 1209        Hold OT eval per neuro; strict bedrest with head of bed flat for 24 hours per patient/family reporting; will follow up tomorrow as appropriate.     Electronically signed by Alia Santoyo OT at 02/10/22 1210          Speech Language Pathology Notes (last 48 hours)      Lu Barcenas at 02/10/22 1234          Acute Care - Speech Language Pathology   Swallow Initial Evaluation [facility]     Patient Name: Deborah Jones  : 1950  MRN: [id number]  Today's Date: 2/10/2022               Admit Date: 2022    Visit Dx:     ICD-10-CM ICD-9-CM   1. Cerebrovascular accident (CVA), unspecified mechanism (HCC)  I63.9 434.91     Patient Active Problem List   Diagnosis   • Cerebrovascular accident (CVA) (HCC)     No past medical history on file.  No past surgical history on file.    SLP Recommendation and Plan  SLP Swallowing Diagnosis: swallow WFL (02/10/22  0945)  SLP Diet Recommendation: regular textures, thin liquids (02/10/22 0945)  Recommended Precautions and Strategies: upright posture during/after eating, general aspiration precautions (02/10/22 0945)  SLP Rec. for Method of Medication Administration: meds whole (02/10/22 0945)     Monitor for Signs of Aspiration: yes, notify SLP if any concerns, cough, gurgly voice, throat clearing, right lower lobe infiltrates (02/10/22 0945)        Anticipated Discharge Disposition (SLP): unknown (02/10/22 0950)     Therapy Frequency (Swallow): PRN (plan to give only exercise program with instruction for R labial strengthening) (02/10/22 0945)                  Patient/Family Concerns, Anticipated Discharge Disposition (SLP): none voiced (02/10/22 0950)                  Progress: no change  Outcome Summary: Bedside eval of pt.'s swallow at bedside and informal communication assessment completed.  Pt. was seated upright in bed with her  present.  Pt. was in good spirits and voiced complaints only of R sided weakness and some slurring of speech.  Oral mech was remarkable for mild dysarthria of speech due to oral weakness, but intelligibility not significantly affected.  Pt. exibited good awareness of deficits as well.  She was given trials of regular solid, puree, and thin liquid.  Oral phase was WFL with all trials.  Mild oral strength deficits did not adversely affect bolus prep, manipulation, transit, or oral clearance with any trial.  No overt s/s aspiration with any trial.  Pt. denied difficulty with po.  Regarding commuication, auditory comprehension was WFL, verbal expression was WFL, and cognition was WFL.  No appreciable deficits exhibited that would adversely affect swallowing or commuication for ADL's or social needs.  Pt. will be given instruction and education in oral motor exercises to improve R labial strength.  Recommend:  1. regular diet with thin liq as herve,  2. meds whole with thin liq as herve,  3.  aspiration precautions.      SWALLOW EVALUATION (last 72 hours)     SLP Adult Swallow Evaluation     Row Name 02/10/22 0945                   Rehab Evaluation    Document Type evaluation  -TM        Subjective Information complains of; weakness  on R side  -TM        Patient Observations alert; cooperative  -TM        Patient/Family/Caregiver Comments/Observations spouse present  -TM        Patient Effort excellent  -TM                  General Information    Patient Profile Reviewed yes  -TM        Current Method of Nutrition nutritional supplements  -TM        Precautions/Limitations, Vision WFL  -TM        Precautions/Limitations, Hearing WFL  -TM        Prior Level of Function-Communication WFL  -TM        Prior Level of Function-Swallowing no diet consistency restrictions  -TM        Plans/Goals Discussed with patient; spouse/S.O.; other (see comments)  RN  -TM        Barriers to Rehab none identified  -TM        Patient's Goals for Discharge return to all previous roles/activities  -TM                  Pain    Additional Documentation Pain Scale: Numbers Pre/Post-Treatment (Group)  -TM                  Pain Scale: Numbers Pre/Post-Treatment    Pretreatment Pain Rating 0/10 - no pain  -TM        Posttreatment Pain Rating 0/10 - no pain  -TM                  Oral Motor Structure and Function    Oral Lesions or Structural Abnormalities and/or variants R labial asymmetry and weakness  -TM        Dentition Assessment natural, present and adequate  -TM        Secretion Management WNL/WFL  -TM        Mucosal Quality moist, healthy  -TM        Volitional Cough WFL  -TM                  Oral Musculature and Cranial Nerve Assessment    Oral Motor General Assessment oral labial or buccal impairment  -TM        Oral Labial or Buccal Impairment, Detail, Cranial Nerve VII (Facial): right labial droop; reduced ROM  -TM                  General Eating/Swallowing Observations    Respiratory Support Currently in Use room air   -TM        Eating/Swallowing Skills self-fed; fed by SLP  -TM        Positioning During Eating upright in bed  -TM        Utensils Used spoon; straw  -TM        Consistencies Trialed regular textures; pureed; thin liquids  -                  Respiratory    Respiratory Status WFL; room air  -                  Clinical Swallow Eval    Oral Prep Phase WFL  -TM        Oral Transit WFL  -TM        Oral Residue WFL  -TM        Pharyngeal Phase no overt signs/symptoms of pharyngeal impairment  -        Clinical Swallow Evaluation Summary Bedside eval of pt.'s swallow at bedside and informal communication assessment completed.  Pt. was seated upright in bed with her  present.  Pt. was in good spirits and voiced complaints only of R sided weakness and some slurring of speech.  Oral mech was remarkable for mild dysarthria of speech due to oral weakness, but intelligibility not significantly affected.  Pt. exibited good awareness of deficits as well.  She was given trials of regular solid, puree, and thin liquid.  Oral phase was WFL with all trials.  Mild oral strength deficits did not adversely affect bolus prep, manipulation, transit, or oral clearance with any trial.  No overt s/s aspiration with any trial.  Pt. denied difficulty with po.  Regarding commuication, auditory comprehension was WFL, verbal expression was WFL, and cognition was WFL.  No appreciable deficits exhibited that would adversely affect swallowing or commuication for ADL's or social needs.  Pt. will be given instruction and education in oral motor exercises to improve R labial strength.  Recommend:  1. regular diet with thin liq as herve,  2. meds whole with thin liq as herve,  3. aspiration precautions.  -                  SLP Evaluation Clinical Impression    SLP Swallowing Diagnosis swallow WFL  -TM        Functional Impact no impact on function  -                  Recommendations    Therapy Frequency (Swallow) PRN  plan to give only  exercise program with instruction for R labial strengthening  -        SLP Diet Recommendation regular textures; thin liquids  -        Recommended Precautions and Strategies upright posture during/after eating; general aspiration precautions  -        Oral Care Recommendations Toothbrush  -        SLP Rec. for Method of Medication Administration meds whole  -TM        Monitor for Signs of Aspiration yes; notify SLP if any concerns; cough; gurgly voice; throat clearing; right lower lobe infiltrates  -TM        Anticipated Discharge Disposition (SLP) unknown  -TM              User Key  (r) = Recorded By, (t) = Taken By, (c) = Cosigned By    Initials Name Effective Dates    TM Lu Barcenas 06/16/21 -                 EDUCATION  The patient has been educated in the following areas:   Communication Impairment Dysphagia (Swallowing Impairment).              Time Calculation:    Time Calculation- SLP     Row Name 02/10/22 1211             Time Calculation- SLP    SLP Start Time 0945  -      SLP Received On 02/10/22  -              Untimed Charges    SLP Eval/Re-eval  ST Eval Oral Pharyng Swallow - 07553; ST Eval Speech and Production w/ Language - 22297  -            User Key  (r) = Recorded By, (t) = Taken By, (c) = Cosigned By    Initials Name Provider Type    TM Lu Barcenas Speech and Language Pathologist                Therapy Charges for Today     Code Description Service Date Service Provider Modifiers Qty    35431334442 HC ST EVAL ORAL PHARYNG SWALLOW 4 2/10/2022 Lu Barcenas GN 1    04930852590 HC ST EVAL SPEECH AND PROD W LANG  4 2/10/2022 Lu Barcenas GN 1               Lu Barcenas  2/10/2022    Electronically signed by Lu Barcenas at 02/10/22 1234     Lu Barcenas at 02/10/22 1210          Problem: Adult Inpatient Plan of Care  Goal: Plan of Care Review  Outcome: Ongoing, Progressing  Flowsheets  Taken 2/10/2022 1210 by Lu Barcenas  Progress: no change  Outcome  Summary: Bedside eval of pt.'s swallow at bedside and informal communication assessment completed.  Pt. was seated upright in bed with her  present.  Pt. was in good spirits and voiced complaints only of R sided weakness and some slurring of speech.  Oral mech was remarkable for mild dysarthria of speech due to oral weakness, but intelligibility not significantly affected.  Pt. exibited good awareness of deficits as well.  She was given trials of regular solid, puree, and thin liquid.  Oral phase was WFL with all trials.  Mild oral strength deficits did not adversely affect bolus prep, manipulation, transit, or oral clearance with any trial.  No overt s/s aspiration with any trial.  Pt. denied difficulty with po.  Regarding commuication, auditory comprehension was WFL, verbal expression was WFL, and cognition was WFL.  No appreciable deficits exhibited that would adversely affect swallowing or commuication for ADL's or social needs.  Pt. will be given instruction and education in oral motor exercises to improve R labial strength.  Recommend:  1. regular diet with thin liq as herve,  2. meds whole with thin liq as herve,  3. aspiration precautions.  Taken 2/10/2022 0311 by Catarina Jacobs, RN  Plan of Care Reviewed With: patient   Goal Outcome Evaluation:           Progress: no change  Outcome Summary: Bedside eval of pt.'s swallow at bedside and informal communication assessment completed.  Pt. was seated upright in bed with her  present.  Pt. was in good spirits and voiced complaints only of R sided weakness and some slurring of speech.  Oral mech was remarkable for mild dysarthria of speech due to oral weakness, but intelligibility not significantly affected.  Pt. exibited good awareness of deficits as well.  She was given trials of regular solid, puree, and thin liquid.  Oral phase was WFL with all trials.  Mild oral strength deficits did not adversely affect bolus prep, manipulation, transit, or oral  clearance with any trial.  No overt s/s aspiration with any trial.  Pt. denied difficulty with po.  Regarding commuication, auditory comprehension was WFL, verbal expression was WFL, and cognition was WFL.  No appreciable deficits exhibited that would adversely affect swallowing or commuication for ADL's or social needs.  Pt. will be given instruction and education in oral motor exercises to improve R labial strength.  Recommend:  1. regular diet with thin liq as herve,  2. meds whole with thin liq as herve,  3. aspiration precautions.    Electronically signed by Lu Barcenas at 02/10/22 1211     Lu Barcenas at 02/10/22 1210        Goal Outcome Evaluation:           Progress: no change  Outcome Summary: Bedside eval of pt.'s swallow at bedside and informal communication assessment completed.  Pt. was seated upright in bed with her  present.  Pt. was in good spirits and voiced complaints only of R sided weakness and some slurring of speech.  Oral mech was remarkable for mild dysarthria of speech due to oral weakness, but intelligibility not significantly affected.  Pt. exibited good awareness of deficits as well.  She was given trials of regular solid, puree, and thin liquid.  Oral phase was WFL with all trials.  Mild oral strength deficits did not adversely affect bolus prep, manipulation, transit, or oral clearance with any trial.  No overt s/s aspiration with any trial.  Pt. denied difficulty with po.  Regarding commuication, auditory comprehension was WFL, verbal expression was WFL, and cognition was WFL.  No appreciable deficits exhibited that would adversely affect swallowing or commuication for ADL's or social needs.  Pt. will be given instruction and education in oral motor exercises to improve R labial strength.  Recommend:  1. regular diet with thin liq as herve,  2. meds whole with thin liq as herve,  3. aspiration precautions.    Electronically signed by Lu Barcenas at 02/10/22 1210

## 2022-02-10 NOTE — H&P
[facility]      Name:  Deborah Jones   Age:  71 y.o.  Sex:  female  :  1950  MRN:  [id number]   Visit Number:  [id number]  Admission Date:  2022  Date Of Service:  22  Primary Care Physician:  Provider, No Known    Chief Complaint:     R facial droop, RLE weakness    History Of Presenting Illness:      71AAF pmh non insulin dependent t2dm woke up this am with heaviness in R leg that has since resolved. At around 2:30PM she developed R sided facial droop which prompted her to to present to ED. Since then R facial droop seems to be resolving. It is a/w dysarthria. No numbness/tingling. No change in vision. No h/o seizures or cva. No h/o heart disease. She takes a daily baby aspirin.    Initial vitals in ED:  22 1614 97.6 (36.4) 97 19 142/95 Sitting room air 99     Significant labs/imaging:  Trop <0.01, na 138, k 4.3, cr 1.21 (baseline unknown), wbc 6.26, hgb 12.5, plt 395  cxr without acute process (my read)  Cta head/neck no significant stenosis/occlusion  Ct head wo: Left basal ganglia lacunar infarcts likely acute or subacute  in age without hemorrhage  CT  Cerebral perfusion: No specific CT evidence of significant core infarct.  In the ED she was given 300mg plavix and 325mg aspirin.  Neurology recommended no TPA and MRI in the AM.    Review Of Systems:    All systems were reviewed and negative except as mentioned in history of presenting illness, assessment and plan.    Past Medical History:  t2dm    Past Surgical History: Patient  has no past surgical history on file.  Hysterectomy    Social History: Patient lives with  and mother-in-law. Smokes half a cigar daily. occassionally drinks alcoholic beverages     Family History: t2dm runs in family. Father had MI ~20 years ago    Allergies:      Patient has no known allergies.    Home Medications:    Prior to Admission Medications     Prescriptions Last Dose Informant Patient Reported?  "Taking?    glipizide (GLUCOTROL) 10 MG tablet   Yes Yes    Take 10 mg by mouth 2 (Two) Times a Day Before Meals.    metFORMIN (GLUCOPHAGE) 1000 MG tablet   Yes Yes    Take 1,000 mg by mouth 2 (Two) Times a Day With Meals.        ED Medications:    Medications   sodium chloride 0.9 % infusion (125 mL/hr Intravenous New Bag 2/9/22 1818)   sodium chloride 0.9 % flush 10 mL (has no administration in time range)   sodium chloride 0.9 % flush 10 mL (has no administration in time range)   atorvastatin (LIPITOR) tablet 80 mg (has no administration in time range)   heparin (porcine) 5000 UNIT/ML injection 5,000 Units (has no administration in time range)   aspirin chewable tablet 81 mg (has no administration in time range)     Or   aspirin suppository 300 mg (has no administration in time range)   clopidogrel (PLAVIX) tablet 75 mg (has no administration in time range)   acetaminophen (TYLENOL) tablet 650 mg (has no administration in time range)     Or   acetaminophen (TYLENOL) suppository 650 mg (has no administration in time range)   promethazine (PHENERGAN) tablet 12.5 mg (has no administration in time range)     Or   promethazine (PHENERGAN) suppository 12.5 mg (has no administration in time range)   aspirin chewable tablet 324 mg (324 mg Oral Given 2/9/22 1724)   clopidogrel (PLAVIX) tablet 300 mg (300 mg Oral Given 2/9/22 1724)   iopamidol (ISOVUE-300) 61 % injection 100 mL (100 mL Intravenous Given 2/9/22 1740)   iopamidol (ISOVUE-300) 61 % injection 50 mL (50 mL Intravenous Given 2/9/22 1740)     Vital Signs:  Temp:  [97.6 °F (36.4 °C)] 97.6 °F (36.4 °C)  Heart Rate:  [62-97] 62  Resp:  [19] 19  BP: (118-142)/(72-95) 118/72        02/09/22  1614   Weight: 54.4 kg (120 lb)     Body mass index is 24.24 kg/m².    Physical Exam:     Most recent vital Signs: /72   Pulse 62   Temp 97.6 °F (36.4 °C) (Oral)   Resp 19   Ht 149.9 cm (59\")   Wt 54.4 kg (120 lb)   SpO2 98%   BMI 24.24 kg/m²     General Appearance:  " Alert and cooperative. NAD. Pleasant.   Head:  Atraumatic and normocephalic.   Eyes: Conjunctivae and sclerae normal, no icterus. No pallor.   Throat: No oral lesions, no thrush, oral mucosa moist.   Neck: Supple, trachea midline, no thyromegaly.   Lungs:   Breath sounds heard bilaterally equally.  No wheezing or crackles. No Pleural rub or bronchial breathing.   Heart:  Normal S1 and S2, no murmur, no gallop, no rub.   Abdomen:   Normal bowel sounds, no masses, no organomegaly. Soft, nontender, nondistended, no rebound tenderness.   Extremities: Supple, no edema, no cyanosis, no clubbing.   Skin: No bleeding or rash.   Neurologic: +R facial droop, mild-moderate; other cranial nerves intact. Strength 5/5 in all 4 extremities. Sensation is grossly symmetric. FTN normal b/l.     Laboratory data:    I have reviewed the labs done in the emergency room.    Results from last 7 days   Lab Units 02/09/22  1654   SODIUM mmol/L 138   POTASSIUM mmol/L 4.3   CHLORIDE mmol/L 101   CO2 mmol/L 23.1   BUN mg/dL 19   CREATININE mg/dL 1.21*   CALCIUM mg/dL 9.8   BILIRUBIN mg/dL 0.2   ALK PHOS U/L 111   ALT (SGPT) U/L 17   AST (SGOT) U/L 20   GLUCOSE mg/dL 130*     Results from last 7 days   Lab Units 02/09/22  1654   WBC 10*3/mm3 6.26   HEMOGLOBIN g/dL 12.5   HEMATOCRIT % 37.6   PLATELETS 10*3/mm3 395     Results from last 7 days   Lab Units 02/09/22  1654   INR  0.84*     Results from last 7 days   Lab Units 02/09/22  1654   TROPONIN T ng/mL <0.010     Results from last 7 days   Lab Units 02/09/22  1654   PROBNP pg/mL 20.5                       Invalid input(s): USDES,  BLOODU, NITRITITE, BACT, EP    Pain Management Panel    There is no flowsheet data to display.             Radiology:    CT Angiogram Neck    Result Date: 2/9/2022  FINAL REPORT TECHNIQUE: Thin section axial CT images were performed through the neck after IV contrast.  3-D reconstruction images were submitted. This study was performed with techniques to keep  radiation doses as low as reasonably achievable (ALARA). Individualized dose reduction techniques using automated exposure control or adjustment of mA and/or kV according to the patient's size were employed. CLINICAL HISTORY: stroke FINDINGS: CTA neck:  NASCET technique utilized for stenosis evaluation. Right carotid: No significant stenosis is seen of the cervical, or internal carotid artery.  Left carotid: No significant stenosis is seen of the cervical common or internal carotid artery.  Vertebrals: The right vertebral artery is dominant and the left vertebral artery is diminutive but patent.  No significant stenosis is present.     No significant stenosis or occlusion of the cervical common or internal carotid arteries. Authenticated by Emil Urias MD on 02/09/2022 06:23:45 PM    CT Angiogram Head    Result Date: 2/9/2022  FINAL REPORT TECHNIQUE: Multiple axial CT angiography images were performed from the foramen magnum to the vertex before and during IV contrast administration. This study was performed with techniques to keep radiation doses as low as reasonably achievable (ALARA). Individualized dose reduction techniques using automated exposure control or adjustment of mA and/or kV according to the patient's size were employed. CLINICAL HISTORY: stroke FINDINGS: No acute intracranial hemorrhage or large acute cortical infarct.  The brain volume is normal for patient's age. Ventricles are normal in size and configuration.  No midline shift.  The basal cisterns are patent.  No skull fracture.  The visualized paranasal sinuses and mastoid air cells are clear. CTA HEAD: No evidence of vascular injury, hemodynamically significant stenosis or major vessel occlusion.     No acute intracranial hemorrhage or large acute cortical infarct. Authenticated by Emil Urias MD on 02/09/2022 06:22:28 PM    CT Head Without Contrast Stroke Protocol    Result Date: 2/9/2022  PROCEDURE: CT HEAD WO CONTRAST STROKE PROTOCOL-   HISTORY: Neuro deficit, acute, stroke suspected  TECHNIQUE: Noncontrast exam  FINDINGS: Diminished attenuation is noted of the left basal ganglia with an oval hypodensity measuring 13 x 8 mm. This is suspicious for an acute or early subacute large lacunar infarct. Remaining parenchyma is homogeneous without evidence of hemorrhage, mass effect or edema. No extra-axial abnormality is noted. Ventricles and cisterns appear normal.  The visualized sinuses, orbits and petrous temporal bones appear unremarkable.      Left basal ganglia lacunar infarcts likely acute or subacute in age without hemorrhage   This study was performed with techniques to keep radiation doses as low as reasonably achievable (ALARA). Individualized dose reduction techniques using automated exposure control or adjustment of vA and/or kV according to the patient size were employed.  This report was signed and finalized on 2/9/2022 4:31 PM by Noah Moon MD.    CT CEREBRAL PERFUSION WITH & WITHOUT CONTRAST    Result Date: 2/9/2022  FINAL REPORT TECHNIQUE: Multiple axial CT images were performed from the foramen magnum to the vertex.  Perfusion images were also performed.  Coronal reformatted images were reconstructed from axial data set.This study was performed with techniques to keep radiation doses as low as reasonably achievable, (ALARA). Individualized dose reduction techniques using automated exposure control or adjustment of mA and/or kV according to the patient's size were employed. CLINICAL HISTORY: stroke symptoms FINDINGS: CT PERFUSION HEAD  FINDINGS: There is relatively symmetric cerebral perfusion without significant cerebral blood flow or blood volume or abnormalities specific for significant core infarct.  There is no significant prolongation of the time to max specific for ischemic penumbra.     No specific CT evidence of significant core infarct. Authenticated by Emil Urias MD on 02/09/2022 06:22:01  PM      Assessment/Plan:    Acute/subacute CVA  -appreciate neurology  -continue aspirin, plavix and lipitor  -tele  -echo  -brain mri wo  -pt/ot/slp  -permissive HTN     tho vs ckd  -ns at 125ml/hr  -am bmp    t2dm  -low dose ssi  -a1c, lipd panel    Risk Assessment: moderate  DVT Prophylaxis: heparin subcu  Code Status: full  Diet: npo until cleared by slp      Max Araujo MD  02/09/22  19:57 EST

## 2022-02-11 LAB
ANION GAP SERPL CALCULATED.3IONS-SCNC: 10.4 MMOL/L (ref 5–15)
BUN SERPL-MCNC: 14 MG/DL (ref 8–23)
BUN/CREAT SERPL: 12.3 (ref 7–25)
CALCIUM SPEC-SCNC: 8.8 MG/DL (ref 8.6–10.5)
CHLORIDE SERPL-SCNC: 110 MMOL/L (ref 98–107)
CO2 SERPL-SCNC: 19.6 MMOL/L (ref 22–29)
CREAT SERPL-MCNC: 1.14 MG/DL (ref 0.57–1)
GFR SERPL CREATININE-BSD FRML MDRD: 57 ML/MIN/1.73
GLUCOSE BLDC GLUCOMTR-MCNC: 130 MG/DL (ref 70–130)
GLUCOSE BLDC GLUCOMTR-MCNC: 138 MG/DL (ref 70–130)
GLUCOSE BLDC GLUCOMTR-MCNC: 151 MG/DL (ref 70–130)
GLUCOSE BLDC GLUCOMTR-MCNC: 222 MG/DL (ref 70–130)
GLUCOSE SERPL-MCNC: 140 MG/DL (ref 65–99)
POTASSIUM SERPL-SCNC: 4 MMOL/L (ref 3.5–5.2)
SODIUM SERPL-SCNC: 140 MMOL/L (ref 136–145)

## 2022-02-11 PROCEDURE — G0378 HOSPITAL OBSERVATION PER HR: HCPCS

## 2022-02-11 PROCEDURE — 99224 PR SBSQ OBSERVATION CARE/DAY 15 MINUTES: CPT | Performed by: FAMILY MEDICINE

## 2022-02-11 PROCEDURE — 99214 OFFICE O/P EST MOD 30 MIN: CPT | Performed by: PSYCHIATRY & NEUROLOGY

## 2022-02-11 PROCEDURE — 82962 GLUCOSE BLOOD TEST: CPT

## 2022-02-11 PROCEDURE — 97165 OT EVAL LOW COMPLEX 30 MIN: CPT

## 2022-02-11 PROCEDURE — 25010000002 HEPARIN (PORCINE) PER 1000 UNITS: Performed by: INTERNAL MEDICINE

## 2022-02-11 PROCEDURE — 96372 THER/PROPH/DIAG INJ SC/IM: CPT

## 2022-02-11 PROCEDURE — 97161 PT EVAL LOW COMPLEX 20 MIN: CPT

## 2022-02-11 PROCEDURE — 80048 BASIC METABOLIC PNL TOTAL CA: CPT | Performed by: FAMILY MEDICINE

## 2022-02-11 RX ADMIN — HEPARIN SODIUM 5000 UNITS: 5000 INJECTION, SOLUTION INTRAVENOUS; SUBCUTANEOUS at 09:13

## 2022-02-11 RX ADMIN — ASPIRIN 81 MG CHEWABLE TABLET 81 MG: 81 TABLET CHEWABLE at 09:13

## 2022-02-11 RX ADMIN — SODIUM CHLORIDE, PRESERVATIVE FREE 10 ML: 5 INJECTION INTRAVENOUS at 09:13

## 2022-02-11 RX ADMIN — HEPARIN SODIUM 5000 UNITS: 5000 INJECTION, SOLUTION INTRAVENOUS; SUBCUTANEOUS at 20:09

## 2022-02-11 RX ADMIN — CLOPIDOGREL BISULFATE 75 MG: 75 TABLET ORAL at 09:13

## 2022-02-11 RX ADMIN — ATORVASTATIN CALCIUM 80 MG: 80 TABLET, FILM COATED ORAL at 20:08

## 2022-02-11 RX ADMIN — SODIUM CHLORIDE, PRESERVATIVE FREE 10 ML: 5 INJECTION INTRAVENOUS at 20:08

## 2022-02-11 NOTE — PLAN OF CARE
Goal Outcome Evaluation:  Plan of Care Reviewed With: patient        Progress: no change  Outcome Summary: PT eval completed. Patient presents with deficits in strength, balance, activity tolerance, safety awareness and independence. She is expected to benefit from continued skilled PT intervention to improve her mobility status prior to D/C.

## 2022-02-11 NOTE — PROGRESS NOTES
Stroke Progress Note       Chief Complaint: Right-sided weakness    Subjective    Subjective     Subjective:  No acute issues overnight.  Patient's right-sided weakness seems to have much improved, patient is feeling much better.    Review of Systems   Constitutional: Negative.    HENT: Negative.    Eyes: Negative.    Respiratory: Negative.    Cardiovascular: Negative.    Gastrointestinal: Negative.    Endocrine: Negative.    Genitourinary: Negative.    Musculoskeletal: Negative.    Skin: Negative.    Allergic/Immunologic: Negative.    Psychiatric/Behavioral: Negative.          Objective    Objective      Temp:  [96.9 °F (36.1 °C)-98.6 °F (37 °C)] 97.7 °F (36.5 °C)  Heart Rate:  [56-72] 61  Resp:  [16-18] 16  BP: (107-120)/(51-99) 116/99    Neurological Exam  Mental Status  Awake, alert and oriented to person, place and time.Alert. Mild dysarthria present. Language is fluent with no aphasia. Attention and concentration are normal.     Cranial Nerves  CN II: Visual fields full to confrontation.  CN III, IV, VI: Extraocular movements intact bilaterally. Pupils equal round and reactive to light bilaterally.  CN V: Facial sensation is normal.  CN VII:  Right facial droop, improved compared to yesterday  CN VIII: Equal hearing.  CN IX, X: Palate elevates symmetrically. Normal gag reflex.  CN XI: Shoulder shrug strength is normal.  CN XII: Tongue midline without atrophy or fasciculations.     Motor  Normal muscle bulk throughout. No fasciculations present.  RUE/RLE drift is much improved, no obvious drift noted today.     Sensory  Light touch is normal in upper and lower extremities.      Reflexes  Not assessed.     Coordination  No dysmetria.     Gait  Not assesed.        Physical Exam  Vitals and nursing note reviewed.   Constitutional:       Appearance: Normal appearance.   HENT:      Head: Normocephalic and atraumatic.      Mouth/Throat:      Mouth: Mucous membranes are moist.      Pharynx: Oropharynx is clear.    Eyes:      Extraocular Movements: Extraocular movements intact.      Conjunctiva/sclera: Conjunctivae normal.      Pupils: Pupils are equal, round, and reactive to light.   Cardiovascular:      Rate and Rhythm: Normal rate and regular rhythm.   Pulmonary:      Effort: Pulmonary effort is normal. No respiratory distress.   Musculoskeletal:      Cervical back: Normal range of motion and neck supple.   Neurological:      Mental Status: She is alert.      Cranial Nerves: Dysarthria present.   Psychiatric:         Mood and Affect: Mood normal.         Behavior: Behavior normal.     Results Review:    I reviewed the patient's new clinical results.  Reviewed her labs and echocardiogram.    Results for orders placed during the hospital encounter of 02/09/22    Adult Transthoracic Echo Complete W/ Cont if Necessary Per Protocol    Interpretation Summary  1.  Normal left ventricular size and systolic function, LVEF 65-70%.  2.  Abnormal LV diastolic filling pattern consistent with grade 2 diastolic dysfunction.  3.  Moderately increased left atrial volume index.  4.  Normal right ventricular size and systolic function.  5.  Mild tricuspid regurgitation, RVSP 27 mmHg.  6.  Negative bubble study with no evidence of intracardiac or intrapulmonary shunt.            Assessment/Plan     Assessment/Plan:  71-year-old right-handed black female with known diagnosis of hypertension, diabetes, smoking cigars, who has been admitted with right-sided weakness and found to have a left anterior choroidal stroke.      1. Left anterior choroidal stroke.  Lacunar versus cardioembolic in etiology. Plan to continue her on dual antiplatelets, with aspirin 81 mg daily, Plavix 75 mg and Lipitor 80 mg daily for secondary stroke prevention.  DC Plavix after 3 months and increase the aspirin to 325 mg at the time.  Patient's 2D echocardiogram showed increased left atrial volume index, recommend to get her Zio patch to look for possible underlying  atrial arrhythmias.  If patient is found to have A. fib, recommend to start her on anticoagulation.  2. Essential hypertension.  Patient's blood pressures have returned to normal with no intervention, and she has not had any worsening of the symptoms.  Continue normal blood pressure goals.  3. Diabetes mellitus type 2 uncontrolled with hyperglycemia.  Her A1c is 7.8, goal of less than seven.  Avoid hypoglycemia.  Maintain normoglycemia  4. Mixed hyperlipidemia.  Continue full dose of statins.  5. Smoking.  Encouraged her to quit smoking cigars.  6. Increase her activity with PT/OT.  Patient is being evaluated for acute rehabilitation.  7. Healthy heart/diabetic diet.     Case was discussed with patient, her daughter, nursing and the primary team.  Okay to discharge her to rehabilitation either today or tomorrow.  We will sign off for now, please call us with questions. Thank you for the consult.          Seth Perez MD  02/11/22  10:42 EST    This was an audio and video enabled telemedicine encounter.

## 2022-02-11 NOTE — CASE MANAGEMENT/SOCIAL WORK
Continued Stay Note   [facility]     Patient Name: Deborah Jones  MRN: [id number]  Today's Date: 2/11/2022    Admit Date: 2/9/2022     Discharge Plan    Accepted clinically- must have PT/OT notes before they can submit to insurance for pre-cert. 2/11/22 Rossy Guido RN    I will submit PT/OT notes when available.                 Expected Discharge Date and Time     Expected Discharge Date Expected Discharge Time    Feb 14, 2022             Evelyn Guido RN

## 2022-02-11 NOTE — THERAPY EVALUATION
Patient Name: Deborah Jones  : 1950    MRN: [id number]                              Today's Date: 2022       Admit Date: 2022    Visit Dx:     ICD-10-CM ICD-9-CM   1. Cerebrovascular accident (CVA), unspecified mechanism (HCC)  I63.9 434.91     Patient Active Problem List   Diagnosis   • Cerebrovascular accident (CVA) (HCC)     Past Medical History:   Diagnosis Date   • Impaired functional mobility, balance, gait, and endurance      No past surgical history on file.   General Information     Row Name 22 1437          OT Time and Intention    Document Type evaluation  -SD     Mode of Treatment occupational therapy  -SD     Row Name 22 1437          General Information    Patient Profile Reviewed yes  -SD     Prior Level of Function independent:; all household mobility; community mobility  -SD     Existing Precautions/Restrictions fall  -SD     Barriers to Rehab medically complex  -SD     Row Name 22 1437          Living Environment    Lives With spouse  -SD     Row Name 22 1437          Home Main Entrance    Stair Railings, Main Entrance none  -SD     Row Name 22 1437          Stairs Within Home, Primary    Number of Stairs, Within Home, Primary none  -SD     Row Name 22 1437          Cognition    Orientation Status (Cognition) oriented x 4  -SD     Row Name 22 1437          Safety Issues, Functional Mobility    Safety Issues Affecting Function (Mobility) safety precautions follow-through/compliance; awareness of need for assistance  -SD     Impairments Affecting Function (Mobility) coordination; endurance/activity tolerance; strength  -SD           User Key  (r) = Recorded By, (t) = Taken By, (c) = Cosigned By    Initials Name Provider Type    SD Alia Santoyo OT Occupational Therapist                 Mobility/ADL's     Row Name 22 1438          Bed Mobility    Bed Mobility supine-sit; sit-supine  -SD     Supine-Sit [place] (Bed Mobility)  modified independence  -SD     Sit-Supine [place] (Bed Mobility) modified independence  -SD     Assistive Device (Bed Mobility) bed rails; head of bed elevated  -SD     Row Name 02/11/22 1438          Transfers    Transfers sit-stand transfer  -SD     Sit-Stand [place] (Transfers) contact guard  -SD     Row Name 02/11/22 1438          Sit-Stand Transfer    Assistive Device (Sit-Stand Transfers) other (see comments)  gait belt  -SD     Row Name 02/11/22 1438          Functional Mobility    Functional Mobility- Ind. Level contact guard assist  -SD     Functional Mobility- Device other (see comments)  gait belt  -SD     Functional Mobility-Distance (Feet) 212  -SD     Functional Mobility- Safety Issues balance decreased during turns; step length decreased; weight-shifting ability decreased  -SD     Row Name 02/11/22 1438          Activities of Daily Living    BADL Assessment/Intervention bathing; upper body dressing; lower body dressing; grooming; feeding; toileting  -SD     Row Name 02/11/22 1438          Bathing Assessment/Intervention    [place] Level (Bathing) minimum assist (75% patient effort)  -SD     Row Name 02/11/22 1438          Upper Body Dressing Assessment/Training    [place] Level (Upper Body Dressing) standby assist  -SD     [facility] Name 02/11/22 1438          Lower Body Dressing Assessment/Training    [place] Level (Lower Body Dressing) don; pants/bottoms; contact guard assist  -SD     [facility] Name 02/11/22 1438          Grooming Assessment/Training    [place] Level (Grooming) supervision  -SD     [facility] Name 02/11/22 1438          Self-Feeding Assessment/Training    [place] Level (Feeding) supervision  -SD     Row Name 02/11/22 1438          Toileting Assessment/Training    [place] Level (Toileting) contact guard assist  -SD     Assistive Devices (Toileting) commode  -SD           User Key  (r) = Recorded By, (t) = Taken By, (c) = Cosigned By    Initials Name Provider Type     Alia Salas OT Occupational Therapist               Obj/Interventions     [facility] Name 02/11/22 1439          Sensory Assessment (Somatosensory)    Sensory Assessment (Somatosensory) sensation intact  -SD     [facility] Name 02/11/22 1439          Vision Assessment/Intervention    Visual Impairment/Limitations WFL  -SD     Row Name 02/11/22 1439          Range of Motion Comprehensive    General Range of Motion bilateral upper extremity ROM WFL  -SD     Row Name 02/11/22 1439          Strength Comprehensive (MMT)    General Manual Muscle Testing (MMT) Assessment upper extremity strength deficits identified  -SD     Comment, General Manual Muscle Testing (MMT) Assessment RUE 4-/5 LUE 4/5  -SD     Row Name 02/11/22 1439          Motor Skills    Motor Skills coordination; writing  -SD     Coordination fine motor deficit; minimal impairment; right; other (see comments)  patient able to sign name, but slow  -SD     Row Name 02/11/22 1439          Writing Skills    Writing Skills mild impairment; dominant hand; words  -SD           User Key  (r) = Recorded By, (t) = Taken By, (c) = Cosigned By    Initials Name Provider Type    SD Alia Santoyo OT Occupational Therapist               Goals/Plan     Row Name 02/11/22 1446          Transfer Goal 1 (OT)    Activity/Assistive Device (Transfer Goal 1, OT) sit-to-stand/stand-to-sit  -SD     [place] Level/Cues Needed (Transfer Goal 1, OT) standby assist  -SD     Time Frame (Transfer Goal 1, OT) long term goal (LTG)  -SD     Progress/Outcome (Transfer Goal 1, OT) goal ongoing  -SD     Row Name 02/11/22 1446          Dressing Goal 1 (OT)    Activity/Device (Dressing Goal 1, OT) dressing skills, all  -SD     [place]/Cues Needed (Dressing Goal 1, OT) standby assist  -SD     Time Frame (Dressing Goal 1, OT) 2 weeks  -SD     Progress/Outcome (Dressing Goal 1, OT) goal ongoing  -SD     Row Name 02/11/22 1446          Toileting Goal 1 (OT)    Activity/Device (Toileting Goal  1, OT) toileting skills, all; commode  -SD     [place] Level/Cues Needed (Toileting Goal 1, OT) standby assist  -SD     Time Frame (Toileting Goal 1, OT) 2 weeks  -SD     Progress/Outcome (Toileting Goal 1, OT) goal ongoing  -SD     [facility] Name 02/11/22 1446          Strength Goal 1 (OT)    Strength Goal 1 (OT) Patient to perform UB ther ex/FMC tasks in order to improve handwriting skills  -SD     Time Frame (Strength Goal 1, OT) long term goal (LTG)  -SD     Progress/Outcome (Strength Goal 1, OT) goal ongoing  -SD     [facility] Name 02/11/22 1446          Therapy Assessment/Plan (OT)    Planned Therapy Interventions (OT) activity tolerance training; adaptive equipment training; BADL retraining; patient/caregiver education/training; ROM/therapeutic exercise; strengthening exercise; transfer/mobility retraining  -SD           User Key  (r) = Recorded By, (t) = Taken By, (c) = Cosigned By    Initials Name Provider Type    Alia Salas OT Occupational Therapist               Clinical Impression     Row Name 02/11/22 1442          Pain Assessment    Additional Documentation Pain Scale: Numbers Pre/Post-Treatment (Group)  -SD     Row Name 02/11/22 1442          Pain Scale: Numbers Pre/Post-Treatment    Pretreatment Pain Rating 3/10  -SD     Posttreatment Pain Rating 2/10  -SD     Pain Location - Orientation lower  -SD     Pain Location back  -SD     Pain Intervention(s) Repositioned; Ambulation/increased activity  -SD     [facility] Name 02/11/22 1442          Plan of Care Review    Plan of Care Reviewed With patient  -SD     Progress no change  -SD     Outcome Summary OT eval completed. Patient presents deficits in strength, coordination, balance, mobility and ADL performance. Patient completed bed mobility with modified independence, transfer and functional mobility with CGA. Patient demonstrates mild coordination deficits on R, noted during handwriting task and demonstrates decreased strength of RUE. Patient requires CGA  overall for ADLs. Patient is expected to benefit from continued OT services prior to DC. Patient wishes to DC to [facility] for STR.  -SD     Row Name 02/11/22 1442          Therapy Assessment/Plan (OT)    Patient/Family Therapy Goal Statement (OT) Increase strength and mobility  -SD     Rehab Potential (OT) good, to achieve stated therapy goals  -SD     Criteria for Skilled Therapeutic Interventions Met (OT) skilled treatment is necessary  -SD     Therapy Frequency (OT) daily  Mon-Fri  -SD     Row Name 02/11/22 1442          Therapy Plan Review/Discharge Plan (OT)    Anticipated Discharge Disposition (OT) inpatient rehabilitation facility  -SD     Row Name 02/11/22 1442          Positioning and Restraints    Pre-Treatment Position in bed  -SD     Post Treatment Position bed  -SD     In Bed sitting EOB; encouraged to call for assist; call light within reach  -SD           User Key  (r) = Recorded By, (t) = Taken By, (c) = Cosigned By    Initials Name Provider Type    Alia Salas OT Occupational Therapist               Outcome Measures     Row Name 02/11/22 1447          How much help from another is currently needed...    Putting on and taking off regular lower body clothing? 3  -SD     Bathing (including washing, rinsing, and drying) 3  -SD     Toileting (which includes using toilet bed pan or urinal) 3  -SD     Putting on and taking off regular upper body clothing 3  -SD     Taking care of personal grooming (such as brushing teeth) 3  -SD     Eating meals 3  -SD     AM-PAC 6 Clicks Score (OT) 18  -SD     Row Name 02/11/22 1315          How much help from another person do you currently need...    Turning from your back to your side while in flat bed without using bedrails? 4  -LM     Moving from lying on back to sitting on the side of a flat bed without bedrails? 4  -LM     Moving to and from a bed to a chair (including a wheelchair)? 3  -LM     Standing up from a chair using your arms (e.g.,  wheelchair, bedside chair)? 3  -LM     Climbing 3-5 steps with a railing? 3  -LM     To walk in hospital room? 3  -LM     AM-PAC 6 Clicks Score (PT) 20  -LM     Row Name 02/11/22 1447 02/11/22 1315       Functional Assessment    Outcome Measure Options AM-PAC 6 Clicks Daily Activity (OT)  -SD AM-PAC 6 Clicks Basic Mobility (PT)  -LM          User Key  (r) = Recorded By, (t) = Taken By, (c) = Cosigned By    Initials Name Provider Type    LM Alyssa Thompson, PT Physical Therapist    Alia Salas OT Occupational Therapist                Occupational Therapy Education                 Title: PT OT SLP Therapies (In Progress)     Topic: Occupational Therapy (In Progress)     Point: ADL training (Done)     Description:   Instruct learner(s) on proper safety adaptation and remediation techniques during self care or transfers.   Instruct in proper use of assistive devices.              Learning Progress Summary           Patient Acceptance, E,TB, VU by SD at 2/11/2022 1448    Comment: Benefit of OT; OT POC                   Point: Home exercise program (Not Started)     Description:   Instruct learner(s) on appropriate technique for monitoring, assisting and/or progressing therapeutic exercises/activities.              Learner Progress:  Not documented in this visit.          Point: Precautions (Not Started)     Description:   Instruct learner(s) on prescribed precautions during self-care and functional transfers.              Learner Progress:  Not documented in this visit.          Point: Body mechanics (Not Started)     Description:   Instruct learner(s) on proper positioning and spine alignment during self-care, functional mobility activities and/or exercises.              Learner Progress:  Not documented in this visit.                      User Key     Initials Effective Dates Name Provider Type Discipline    SD 06/16/21 -  Alia Santoyo OT Occupational Therapist OT              OT Recommendation and  Plan  Planned Therapy Interventions (OT): activity tolerance training, adaptive equipment training, BADL retraining, patient/caregiver education/training, ROM/therapeutic exercise, strengthening exercise, transfer/mobility retraining  Therapy Frequency (OT): daily (Mon-Fri)  Plan of Care Review  Plan of Care Reviewed With: patient  Progress: no change  Outcome Summary: OT eval completed. Patient presents deficits in strength, coordination, balance, mobility and ADL performance. Patient completed bed mobility with modified independence, transfer and functional mobility with CGA. Patient demonstrates mild coordination deficits on R, noted during handwriting task and demonstrates decreased strength of RUE. Patient requires CGA overall for ADLs. Patient is expected to benefit from continued OT services prior to DC. Patient wishes to DC to [facility] for STR.     Time Calculation:    Time Calculation- OT     Row Name 02/11/22 1449             Time Calculation- OT    OT Start Time 1330  -SD      OT Received On 02/11/22  -SD      OT Goal Re-Cert Due Date 02/23/22  -SD              Untimed Charges    OT Eval/Re-eval Minutes 30  -SD              Total Minutes    Untimed Charges Total Minutes 30  -SD       Total Minutes 30  -SD            User Key  (r) = Recorded By, (t) = Taken By, (c) = Cosigned By    Initials Name Provider Type    Alia Salas OT Occupational Therapist              Therapy Charges for Today     Code Description Service Date Service Provider Modifiers Qty    [id number]  OT EVAL LOW COMPLEXITY 2 2/11/2022 Alia Santoyo OT GO 1               Alia Santoyo OT  2/11/2022

## 2022-02-11 NOTE — PROGRESS NOTES
[facility]    Name:  Deborah Jones   Age:  71 y.o.  Sex:  female  :  1950  MRN:  [id number]   Visit Number:  [id number]  Admission Date:  2022  Date Of Service:  22  Primary Care Physician:  Joseluis Keyes MD     LOS: 1 day :    Chief Complaint:      Follow-up CVA    Subjective:    Patient seen again today.  She appears overall to be doing well.  Still with some right-sided weakness, mild facial droop.  Eating and drinking well.  Is looking at [facility] for rehab, has multiple family members who work there, if she does need further therapy on an inpatient setting.    Hospital Course:    Patient is a 71-year-old female with history of type 2 diabetes, presented from home with complaints of apparent right sided facial droop, lower extremity weakness that had started at 1430 on 2022.  Initial work-up in the emergency room was concerning for CVA, with CT head without contrast demonstrating a left basal ganglia lacunar infarct acute versus subacute.  CT cerebral perfusion unremarkable.  CT angio head neck unremarkable.  Labs were otherwise largely unremarkable.  She was given loading dose of Plavix and 325 mg of aspirin and teleneurologist consulted on presentation.  Patient was admitted for observation and work-up.    MRI of the brain today consistent with acute stroke in left basal ganglia region.  Seen by speech therapy and cleared for diet.  Teleneurology recommending 24-hour bedrest at this time.  Continue on aspirin, Plavix, Lipitor.  Diabetic/cardiac diet.  2D echo.    Echo findings with normal EF, negative bubble study, with grade 2 diastolic dysfunction.  Telemetry monitoring thus far with no evidence of atrial fibrillation or arrhythmia.  Neurology recommending dual antiplatelet therapy in addition to statin therapy.  Patient will also likely benefit from cardiac monitoring on outpatient basis for the next 30 days to rule out  cardioembolic arrhythmia.  Looking at potential inpatient rehab pending PT and OT evaluations.    Review of Systems:     All systems were reviewed and negative except as mentioned in subjective, assessment and plan.    Vital Signs:    Temp:  [97.7 °F (36.5 °C)-98.6 °F (37 °C)] 98 °F (36.7 °C)  Heart Rate:  [50-72] 50  Resp:  [16-18] 16  BP: (107-129)/(60-99) 129/67    Intake and output:    I/O last 3 completed shifts:  In: 2182.5 [P.O.:720; I.V.:1462.5]  Out: 1400 [Urine:1400]  I/O this shift:  In: 600 [P.O.:600]  Out: 300 [Urine:300]    Physical Examination:    General Appearance:  Alert and cooperative.  No acute distress   Head:  Atraumatic and normocephalic.   Eyes: Conjunctivae and sclerae normal, no icterus. No pallor.   Throat: No oral lesions, no thrush, oral mucosa moist.   Neck: Supple, trachea midline, no thyromegaly.   Lungs:   Breath sounds heard bilaterally equally.  No wheezing or crackles. No Pleural rub or bronchial breathing.   Heart:  Normal S1 and S2, no murmur, no gallop, no rub. No JVD.   Abdomen:   Normal bowel sounds, no masses, no organomegaly. Soft, nontender, nondistended, no rebound tenderness.   Extremities: Supple, no edema, no cyanosis, no clubbing.   Skin: No bleeding or rash.   Neurologic: Alert and oriented x 3.  There is facial droop of the right, dysarthria slightly improved.  There is ongoing right upper extremity weakness compared to the left.     Laboratory results:    Results from last 7 days   Lab Units 02/11/22  0524 02/10/22  0545 02/09/22  1654   SODIUM mmol/L 140 140 138   POTASSIUM mmol/L 4.0 4.2 4.3   CHLORIDE mmol/L 110* 110* 101   CO2 mmol/L 19.6* 20.8* 23.1   BUN mg/dL 14 16 19   CREATININE mg/dL 1.14* 1.10* 1.21*   CALCIUM mg/dL 8.8 8.9 9.8   BILIRUBIN mg/dL  --   --  0.2   ALK PHOS U/L  --   --  111   ALT (SGPT) U/L  --   --  17   AST (SGOT) U/L  --   --  20   GLUCOSE mg/dL 140* 145* 130*     Results from last 7 days   Lab Units 02/10/22  0545 02/09/22  7702   WBC  10*3/mm3 5.59 6.26   HEMOGLOBIN g/dL 12.1 12.5   HEMATOCRIT % 37.9 37.6   PLATELETS 10*3/mm3 360 395     Results from last 7 days   Lab Units 02/09/22  1654   INR  0.84*     Results from last 7 days   Lab Units 02/09/22  1654   TROPONIN T ng/mL <0.010             I have reviewed the patient's laboratory results.    Radiology results:    Adult Transthoracic Echo Complete W/ Cont if Necessary Per Protocol    Result Date: 2/10/2022  1.  Normal left ventricular size and systolic function, LVEF 65-70%. 2.  Abnormal LV diastolic filling pattern consistent with grade 2 diastolic dysfunction. 3.  Moderately increased left atrial volume index. 4.  Normal right ventricular size and systolic function. 5.  Mild tricuspid regurgitation, RVSP 27 mmHg. 6.  Negative bubble study with no evidence of intracardiac or intrapulmonary shunt.    CT Angiogram Neck    Result Date: 2/9/2022  FINAL REPORT TECHNIQUE: Thin section axial CT images were performed through the neck after IV contrast.  3-D reconstruction images were submitted. This study was performed with techniques to keep radiation doses as low as reasonably achievable (ALARA). Individualized dose reduction techniques using automated exposure control or adjustment of mA and/or kV according to the patient's size were employed. CLINICAL HISTORY: stroke FINDINGS: CTA neck:  NASCET technique utilized for stenosis evaluation. Right carotid: No significant stenosis is seen of the cervical, or internal carotid artery.  Left carotid: No significant stenosis is seen of the cervical common or internal carotid artery.  Vertebrals: The right vertebral artery is dominant and the left vertebral artery is diminutive but patent.  No significant stenosis is present.     Impression: No significant stenosis or occlusion of the cervical common or internal carotid arteries. Authenticated by Emil Urias MD on 02/09/2022 06:23:45 PM    MRI Brain Without Contrast    Result Date: 2/10/2022  MRI BRAIN  WO CONTRAST-  HISTORY: Stroke, follow up; I63.9-Cerebral infarction, unspecified  TECHNIQUE: Multiplanar imaging was performed of the brain without gadolinium infusion.  Diffusion sequences show increased signal compatible with restricted diffusion within the left posterior basal ganglia. The largest area of involvement is up to 12 mm which corresponds to the CT abnormality. Appearance is compatible with an acute or early subacute lacunar infarct. No areas of restricted diffusion are seen involving the cortical tissues.  Mild edema is seen corresponding to the lacunar infarct within the left basal ganglia. There is no hemorrhage.  Mild periventricular white matter signal changes are seen compatible with mild chronic microvascular disease.  Ventricles are normal. No mass or mass effect is present. No extra-axial abnormal findings are identified.      Impression: 1. Acute or early subacute lacunar infarct of the left basal ganglia corresponding to CT abnormality. No associated hemorrhage or significant mass effect. 2. No evidence of cortical infarct.  This report was signed and finalized on 2/10/2022 8:59 AM by Noah Moon MD.    XR Chest 1 View    Result Date: 2/10/2022  PROCEDURE: XR CHEST 1 VW-  HISTORY: stroke , symptoms of CVA. Facial droop.  COMPARISON:  None  FINDINGS:  Portable view of the chest demonstrates the lungs to be grossly clear. There is no evidence of effusion, pneumothorax or other significant pleural disease. The mediastinum is unremarkable.  The heart size is normal.      Impression: Unremarkable portable chest.  This report was signed and finalized on 2/10/2022 7:37 AM by Noah Moon MD.    CT Angiogram Head    Result Date: 2/9/2022  FINAL REPORT TECHNIQUE: Multiple axial CT angiography images were performed from the foramen magnum to the vertex before and during IV contrast administration. This study was performed with techniques to keep radiation doses as low as reasonably achievable  (ALARA). Individualized dose reduction techniques using automated exposure control or adjustment of mA and/or kV according to the patient's size were employed. CLINICAL HISTORY: stroke FINDINGS: No acute intracranial hemorrhage or large acute cortical infarct.  The brain volume is normal for patient's age. Ventricles are normal in size and configuration.  No midline shift.  The basal cisterns are patent.  No skull fracture.  The visualized paranasal sinuses and mastoid air cells are clear. CTA HEAD: No evidence of vascular injury, hemodynamically significant stenosis or major vessel occlusion.     Impression: No acute intracranial hemorrhage or large acute cortical infarct. Authenticated by Emil Urias MD on 02/09/2022 06:22:28 PM    CT Head Without Contrast Stroke Protocol    Result Date: 2/9/2022  PROCEDURE: CT HEAD WO CONTRAST STROKE PROTOCOL-  HISTORY: Neuro deficit, acute, stroke suspected  TECHNIQUE: Noncontrast exam  FINDINGS: Diminished attenuation is noted of the left basal ganglia with an oval hypodensity measuring 13 x 8 mm. This is suspicious for an acute or early subacute large lacunar infarct. Remaining parenchyma is homogeneous without evidence of hemorrhage, mass effect or edema. No extra-axial abnormality is noted. Ventricles and cisterns appear normal.  The visualized sinuses, orbits and petrous temporal bones appear unremarkable.      Impression: Left basal ganglia lacunar infarcts likely acute or subacute in age without hemorrhage   This study was performed with techniques to keep radiation doses as low as reasonably achievable (ALARA). Individualized dose reduction techniques using automated exposure control or adjustment of vA and/or kV according to the patient size were employed.  This report was signed and finalized on 2/9/2022 4:31 PM by Noah Moon MD.    CT CEREBRAL PERFUSION WITH & WITHOUT CONTRAST    Result Date: 2/9/2022  FINAL REPORT TECHNIQUE: Multiple axial CT images were  performed from the foramen magnum to the vertex.  Perfusion images were also performed.  Coronal reformatted images were reconstructed from axial data set.This study was performed with techniques to keep radiation doses as low as reasonably achievable, (ALARA). Individualized dose reduction techniques using automated exposure control or adjustment of mA and/or kV according to the patient's size were employed. CLINICAL HISTORY: stroke symptoms FINDINGS: CT PERFUSION HEAD  FINDINGS: There is relatively symmetric cerebral perfusion without significant cerebral blood flow or blood volume or abnormalities specific for significant core infarct.  There is no significant prolongation of the time to max specific for ischemic penumbra.     Impression: No specific CT evidence of significant core infarct. Authenticated by Emil Urias MD on 02/09/2022 06:22:01 PM    I have reviewed the patient's radiology reports.    Medication Review:     I have reviewed the patient's active and prn medications.     Problem List:      Cerebrovascular accident (CVA) ([facility])      Assessment:    1. Basal ganglia stroke, present on admission  2. Type 2 diabetes  3. Hyperlipidemia  4. Mild renal insufficiency    Plan:    Patient is overall stable with otherwise stable vital signs.  Appropriate blood pressure at this time.  We will continue on aspirin and Plavix in addition to high intensity statin therapy per neurology recommendations.  PT and OT evaluations today.  Patient may benefit from inpatient rehab, is requesting referral to cardiology if necessary.  Case management notified.  She is otherwise medically stable for discharge once arranged.    DVT Prophylaxis: Heparin  Code Status: Full  Diet: Carb consistent/cardiac  Discharge Plan: 1 to 2 days to rehab facility    Belle Haque DO  02/11/22  15:03 EST    Dictated utilizing Dragon dictation.

## 2022-02-11 NOTE — PLAN OF CARE
Goal Outcome Evaluation:  Plan of Care Reviewed With: patient        Progress: no change  Outcome Summary: OT eval completed. Patient presents deficits in strength, coordination, balance, mobility and ADL performance. Patient completed bed mobility with modified independence, transfer and functional mobility with CGA. Patient demonstrates mild coordination deficits on R, noted during handwriting task and demonstrates decreased strength of RUE. Patient requires CGA overall for ADLs. Patient is expected to benefit from continued OT services prior to DC. Patient wishes to DC to [facility] for STR.

## 2022-02-11 NOTE — THERAPY EVALUATION
Patient Name: Deborah Jones  : 1950    MRN: [id number]                              Today's Date: 2022       Admit Date: 2022    Visit Dx:     ICD-10-CM ICD-9-CM   1. Cerebrovascular accident (CVA), unspecified mechanism (HCC)  I63.9 434.91     Patient Active Problem List   Diagnosis   • Cerebrovascular accident (CVA) (HCC)     Past Medical History:   Diagnosis Date   • Impaired functional mobility, balance, gait, and endurance      No past surgical history on file.   General Information     Row Name 22 [facility]          Physical Therapy Time and Intention    Document Type evaluation  -LM     Mode of Treatment physical therapy  -LM     Row Name 22          General Information    Patient Profile Reviewed yes  -LM     Prior Level of Function independent:; all household mobility; community mobility  -LM     Existing Precautions/Restrictions fall  -LM     Barriers to Rehab medically complex  -LM     Row Name 22          Living Environment    Lives With spouse  -LM     Row Name 22          Home Main Entrance    Number of Stairs, Main Entrance none  -LM     Row Name 22          Stairs Within Home, Primary    Number of Stairs, Within Home, Primary none  -LM     Row Name 22          Cognition    Orientation Status (Cognition) oriented x 4  -LM     Row Name 22          Safety Issues, Functional Mobility    Safety Issues Affecting Function (Mobility) safety precaution awareness; safety precautions follow-through/compliance; awareness of need for assistance  -LM     Impairments Affecting Function (Mobility) coordination; endurance/activity tolerance; strength; motor control  -LM           User Key  (r) = Recorded By, (t) = Taken By, (c) = Cosigned By    Initials Name Provider Type    LM Alyssa Thompson, PT Physical Therapist               Mobility     Row Name 22          Bed Mobility    Bed Mobility supine-sit-supine  -LM      Supine-Sit-Supine [place] (Bed Mobility) modified independence  -LM     Assistive Device (Bed Mobility) bed rails; head of bed elevated  -LM     Row Name 02/11/22 1315          Sit-Stand Transfer    Sit-Stand [place]ifer (Transfers) contact guard  -LM     Assistive Device (Sit-Stand Transfers) other (see comments)  gait belt  -LM     Row Name 02/11/22 1315          Gait/Stairs (Locomotion)    [place] Level (Gait) contact guard  -LM     Assistive Device (Gait) other (see comments)  gait belt  -LM     Distance in Feet (Gait) 212'; slow mary and LOB noted; R LE crossover gait at times.  -LM     Deviations/Abnormal Patterns (Gait) base of support, narrow; mary decreased; gait speed decreased; stride length decreased; weight shifting decreased  -LM     Bilateral Gait Deviations heel strike decreased  -LM           User Key  (r) = Recorded By, (t) = Taken By, (c) = Cosigned By    Initials Name Provider Type    LM Alyssa Thompson, PT Physical Therapist               Obj/Interventions     Row Name 02/11/22 1315          Range of Motion Comprehensive    General Range of Motion bilateral upper extremity ROM WFL; bilateral lower extremity ROM WFL  -LM     Row Name 02/11/22 1315          Strength Comprehensive (MMT)    General Manual Muscle Testing (MMT) Assessment lower extremity strength deficits identified  -LM     Comment, General Manual Muscle Testing (MMT) Assessment R LE 4-/5; L LE 4/5  -LM     Row Name 02/11/22 1315          Balance    Balance Assessment sitting static balance; sitting dynamic balance; standing static balance; standing dynamic balance  -LM     Static Sitting Balance WFL; unsupported; sitting, edge of bed  -LM     Dynamic Sitting Balance WFL; unsupported; sitting, edge of bed  -LM     Static Standing Balance WFL; unsupported  -LM     Dynamic Standing Balance mild impairment; unsupported  -LM           User Key  (r) = Recorded By, (t) = Taken By, (c) = Cosigned By    Initials Name Provider  Type    LM Alyssa Thompson, PT Physical Therapist               Goals/Plan     Row Name 02/11/22 1315          Bed Mobility Goal 1 (PT)    Activity/Assistive Device (Bed Mobility Goal 1, PT) bed mobility activities, all  -LM     [place] Level/Cues Needed (Bed Mobility Goal 1, PT) independent  -LM     Time Frame (Bed Mobility Goal 1, PT) long term goal (LTG); 10 days  -LM     Progress/Outcomes (Bed Mobility Goal 1, PT) goal ongoing  -LM     Row Name 02/11/22 1315          Transfer Goal 1 (PT)    Activity/Assistive Device (Transfer Goal 1, PT) sit-to-stand/stand-to-sit; bed-to-chair/chair-to-bed  -LM     [place] Level/Cues Needed (Transfer Goal 1, PT) standby assist  -LM     Time Frame (Transfer Goal 1, PT) long term goal (LTG); 10 days  -LM     Progress/Outcome (Transfer Goal 1, PT) goal ongoing  -LM     Row Name 02/11/22 1315          Gait Training Goal 1 (PT)    Activity/Assistive Device (Gait Training Goal 1, PT) gait (walking locomotion); improve balance and speed; increase endurance/gait distance  -LM     [place] Level (Gait Training Goal 1, PT) standby assist  -LM     Distance (Gait Training Goal 1, PT) 400'  -LM     Time Frame (Gait Training Goal 1, PT) long term goal (LTG); 10 days  -LM     Progress/Outcome (Gait Training Goal 1, PT) goal ongoing  -LM     Row Name 02/11/22 1315          Patient Education Goal (PT)    Activity (Patient Education Goal, PT) Patient will demonstrate independence with HEP.  -LM     [place]/Cues/Accuracy (Memory Goal 2, PT) demonstrates adequately; independent; verbalizes understanding  -LM     Time Frame (Patient Education Goal, PT) long term goal (LTG); 10 days  -LM     Progress/Outcome (Patient Education Goal, PT) goal ongoing  -LM           User Key  (r) = Recorded By, (t) = Taken By, (c) = Cosigned By    Initials Name Provider Type    Alyssa Bryant, PT Physical Therapist               Clinical Impression     Row Name 02/11/22 1315          Pain     Additional Documentation Pain Scale: Numbers Pre/Post-Treatment (Group)  -LM     Row Name 02/11/22 1315          Pain Scale: Numbers Pre/Post-Treatment    Pretreatment Pain Rating 3/10  -LM     Posttreatment Pain Rating 2/10  -LM     Pain Location - Orientation lower  -LM     Pain Location back  -LM     Pain Intervention(s) Repositioned; Ambulation/increased activity  -LM     Row Name 02/11/22 1315          Plan of Care Review    Plan of Care Reviewed With patient  -LM     Progress no change  -LM     Outcome Summary PT eval completed. Patient presents with deficits in strength, balance, activity tolerance, safety awareness and independence. She is expected to benefit from continued skilled PT intervention to improve her mobility status prior to D/C.  -LM     Row Name 02/11/22 1315          Therapy Assessment/Plan (PT)    Patient/Family Therapy Goals Statement (PT) Get stronger and return home.  -LM     Rehab Potential (PT) good, to achieve stated therapy goals  -LM     Criteria for Skilled Interventions Met (PT) yes; skilled treatment is necessary; meets criteria  -LM     Row Name 02/11/22 1315          Vital Signs    O2 Delivery Pre Treatment room air  -LM     O2 Delivery Intra Treatment room air  -LM     O2 Delivery Post Treatment room air  -LM     Row Name 02/11/22 1311          Positioning and Restraints    Pre-Treatment Position in bed  -LM     Post Treatment Position bed  -LM     In Bed sitting EOB; encouraged to call for assist; call light within reach  -LM           User Key  (r) = Recorded By, (t) = Taken By, (c) = Cosigned By    Initials Name Provider Type    LM Alyssa Thompson, PT Physical Therapist               Outcome Measures     Row Name 02/11/22 1315          How much help from another person do you currently need...    Turning from your back to your side while in flat bed without using bedrails? 4  -LM     Moving from lying on back to sitting on the side of a flat bed without bedrails? 4  -LM      Moving to and from a bed to a chair (including a wheelchair)? 3  -LM     Standing up from a chair using your arms (e.g., wheelchair, bedside chair)? 3  -LM     Climbing 3-5 steps with a railing? 3  -LM     To walk in hospital room? 3  -LM     AM-PAC 6 Clicks Score (PT) 20  -LM     Row Name 02/11/22 1447 02/11/22 1315       Functional Assessment    Outcome Measure Options AM-PAC 6 Clicks Daily Activity (OT)  -SD AM-PAC 6 Clicks Basic Mobility (PT)  -          User Key  (r) = Recorded By, (t) = Taken By, (c) = Cosigned By    Initials Name Provider Type    Alyssa Bryant, PT Physical Therapist    Alia Salas, OT Occupational Therapist                             Physical Therapy Education                 Title: PT OT SLP Therapies (In Progress)     Topic: Physical Therapy (Done)     Point: Mobility training (Done)     Learning Progress Summary           Patient Acceptance, E,TB, VU by  at 2/11/2022 1449    Comment: Purpose of PT/POC.                   Point: Home exercise program (Done)     Learning Progress Summary           Patient Acceptance, E,TB, VU by  at 2/11/2022 1449    Comment: Purpose of PT/POC.                   Point: Precautions (Done)     Learning Progress Summary           Patient Acceptance, E,TB, VU by  at 2/11/2022 1449    Comment: Purpose of PT/POC.                               User Key     Initials Effective Dates Name Provider Type Discipline     06/16/21 -  Alyssa Thompson, PT Physical Therapist PT              PT Recommendation and Plan  Planned Therapy Interventions (PT): balance training, bed mobility training, gait training, home exercise program, strengthening, patient/family education, neuromuscular re-education, transfer training  Plan of Care Reviewed With: patient  Progress: no change  Outcome Summary: PT eval completed. Patient presents with deficits in strength, balance, activity tolerance, safety awareness and independence. She is expected to benefit from continued  skilled PT intervention to improve her mobility status prior to D/C.     Time Calculation:    PT Charges     Row Name 02/11/22 1451             Time Calculation    Start Time 1315  -LM      PT Received On 02/11/22  -      PT Goal Re-Cert Due Date 02/21/22  -            User Key  (r) = Recorded By, (t) = Taken By, (c) = Cosigned By    Initials Name Provider Type     Alyssa Thompson, PT Physical Therapist              Therapy Charges for Today     Code Description Service Date Service Provider Modifiers Qty    05254129025 HC PT EVAL LOW COMPLEXITY 3 2/11/2022 Alyssa Thompson, PT GP 1          PT G-Codes  Outcome Measure Options: AM-PAC 6 Clicks Daily Activity (OT)  AM-PAC 6 Clicks Score (PT): 20  AM-PAC 6 Clicks Score (OT): 18    Alyssa Thompson PT  2/11/2022

## 2022-02-12 LAB
GLUCOSE BLDC GLUCOMTR-MCNC: 130 MG/DL (ref 70–130)
GLUCOSE BLDC GLUCOMTR-MCNC: 130 MG/DL (ref 70–130)
GLUCOSE BLDC GLUCOMTR-MCNC: 144 MG/DL (ref 70–130)
GLUCOSE BLDC GLUCOMTR-MCNC: 156 MG/DL (ref 70–130)

## 2022-02-12 PROCEDURE — 97116 GAIT TRAINING THERAPY: CPT

## 2022-02-12 PROCEDURE — G0378 HOSPITAL OBSERVATION PER HR: HCPCS

## 2022-02-12 PROCEDURE — 25010000002 HEPARIN (PORCINE) PER 1000 UNITS: Performed by: INTERNAL MEDICINE

## 2022-02-12 PROCEDURE — 96372 THER/PROPH/DIAG INJ SC/IM: CPT

## 2022-02-12 PROCEDURE — 99224 PR SBSQ OBSERVATION CARE/DAY 15 MINUTES: CPT | Performed by: FAMILY MEDICINE

## 2022-02-12 PROCEDURE — 82962 GLUCOSE BLOOD TEST: CPT

## 2022-02-12 PROCEDURE — 97110 THERAPEUTIC EXERCISES: CPT

## 2022-02-12 PROCEDURE — 97530 THERAPEUTIC ACTIVITIES: CPT

## 2022-02-12 RX ORDER — GLIPIZIDE 5 MG/1
5 TABLET ORAL
Status: DISCONTINUED | OUTPATIENT
Start: 2022-02-13 | End: 2022-02-15 | Stop reason: HOSPADM

## 2022-02-12 RX ADMIN — HEPARIN SODIUM 5000 UNITS: 5000 INJECTION, SOLUTION INTRAVENOUS; SUBCUTANEOUS at 20:56

## 2022-02-12 RX ADMIN — ASPIRIN 81 MG CHEWABLE TABLET 81 MG: 81 TABLET CHEWABLE at 09:22

## 2022-02-12 RX ADMIN — ATORVASTATIN CALCIUM 80 MG: 80 TABLET, FILM COATED ORAL at 20:56

## 2022-02-12 RX ADMIN — HEPARIN SODIUM 5000 UNITS: 5000 INJECTION, SOLUTION INTRAVENOUS; SUBCUTANEOUS at 09:21

## 2022-02-12 RX ADMIN — CLOPIDOGREL BISULFATE 75 MG: 75 TABLET ORAL at 09:21

## 2022-02-12 RX ADMIN — SODIUM CHLORIDE, PRESERVATIVE FREE 10 ML: 5 INJECTION INTRAVENOUS at 09:22

## 2022-02-12 NOTE — PLAN OF CARE
Goal Outcome Evaluation:  Plan of Care Reviewed With: patient        Progress: improving  Outcome Summary: Pt performed B LE ex in standing hip abd, marching, hip flex, heel raisies 10 reps with 1 UE support. Pt ambs with decreaced mary and LOB noted; R LE crossover gait at times with 5 reps of unsteadiness/path deviations noted. Pt performs bed mob mod I. Pt appears with some R side negelect as pt bumps R arm/elb on wall especially during turns at doorway with turn to R. Con't with PT POC and progressing as tolerated

## 2022-02-12 NOTE — PLAN OF CARE
Goal Outcome Evaluation:  Plan of Care Reviewed With: patient, daughter        Progress: improving     Patient participating with therapy. No acute events this shift. VSS. Discharge plan includes possible discharge to inpatient rehab facility related to right-sided weakness. Continue to monitor patient progress.

## 2022-02-12 NOTE — DISCHARGE PLACEMENT REQUEST
"Sharda Ochoa (71 y.o. Female)             Date of Birth Social Security Number Address Home Phone MRN    1950  [address] [place] [id number] [phone] [id number]    Christianity Marital Status             Unknown        Admission Date Admission Type Admitting Provider Attending Provider Department, Room/Bed    22 Emergency  Belle Haque DO [facility] MED SURG  3, 304/1    Discharge Date Discharge Disposition Discharge Destination                         Attending Provider: Belle Haque DO    Allergies: No Known Allergies    Isolation: None   Infection: None   Code Status: CPR   Advance Care Planning Activity    Ht: 152.4 cm (60\")   Wt: 56.8 kg (125 lb 3.5 oz)    Admission Cmt: None   Principal Problem: None                Active Insurance as of 2022     Primary Coverage     Payor Plan Insurance Group Employer/Plan Group    WELLCARE [facility] MEDICARE REPLACEMENT WELLCARE MEDICARE REPLACEMENT      Payor Plan Address Payor Plan Phone Number Payor Plan Fax Number Effective Dates    [address] [address] [phone]  2022 - None Entered    [facility] [id number]       Subscriber Name Subscriber Birth Date Member ID       SHARDA OCOHA 1950 [id number]                 Emergency Contacts      (Rel.) Home Phone Work Phone Mobile Phone    DAVISDARRIN SHAHH (Spouse) [phone] -- --               History & Physical      Max Araujo MD at 22 [address] [place] [address] HOSPITALIST   HISTORY AND PHYSICAL      Name:  Sharda Ochoa   Age:  71 y.o.  Sex:  female  :  1950  MRN:  [id number]   Visit Number:  [id number]  Admission Date:  2022  Date Of Service:  22  Primary Care Physician:  Provider, No Known    Chief Complaint:     R facial droop, RLE weakness    History Of Presenting Illness:      71AAF pmh non insulin dependent t2dm woke up this am with heaviness in R leg that has since resolved. At around 2:30PM she " developed R sided facial droop which prompted her to to present to ED. Since then R facial droop seems to be resolving. It is a/w dysarthria. No numbness/tingling. No change in vision. No h/o seizures or cva. No h/o heart disease. She takes a daily baby aspirin.    Initial vitals in ED:  02/09/22 1614 97.6 (36.4) 97 19 142/95 Sitting room air 99     Significant labs/imaging:  Trop <0.01, na 138, k 4.3, cr 1.21 (baseline unknown), wbc 6.26, hgb 12.5, plt 395  cxr without acute process (my read)  Cta head/neck no significant stenosis/occlusion  Ct head wo: Left basal ganglia lacunar infarcts likely acute or subacute  in age without hemorrhage  CT  Cerebral perfusion: No specific CT evidence of significant core infarct.  In the ED she was given 300mg plavix and 325mg aspirin.  Neurology recommended no TPA and MRI in the AM.    Review Of Systems:    All systems were reviewed and negative except as mentioned in history of presenting illness, assessment and plan.    Past Medical History:  t2dm    Past Surgical History: Patient  has no past surgical history on file.  Hysterectomy    Social History: Patient lives with  and mother-in-law. Smokes half a cigar daily. occassionally drinks alcoholic beverages     Family History: t2dm runs in family. Father had MI ~20 years ago    Allergies:      Patient has no known allergies.    Home Medications:    Prior to Admission Medications     Prescriptions Last Dose Informant Patient Reported? Taking?    glipizide (GLUCOTROL) 10 MG tablet   Yes Yes    Take 10 mg by mouth 2 (Two) Times a Day Before Meals.    metFORMIN (GLUCOPHAGE) 1000 MG tablet   Yes Yes    Take 1,000 mg by mouth 2 (Two) Times a Day With Meals.        ED Medications:    Medications   sodium chloride 0.9 % infusion (125 mL/hr Intravenous New Bag 2/9/22 7526)   sodium chloride 0.9 % flush 10 mL (has no administration in time range)   sodium chloride 0.9 % flush 10 mL (has no administration in time range)  "  atorvastatin (LIPITOR) tablet 80 mg (has no administration in time range)   heparin (porcine) 5000 UNIT/ML injection 5,000 Units (has no administration in time range)   aspirin chewable tablet 81 mg (has no administration in time range)     Or   aspirin suppository 300 mg (has no administration in time range)   clopidogrel (PLAVIX) tablet 75 mg (has no administration in time range)   acetaminophen (TYLENOL) tablet 650 mg (has no administration in time range)     Or   acetaminophen (TYLENOL) suppository 650 mg (has no administration in time range)   promethazine (PHENERGAN) tablet 12.5 mg (has no administration in time range)     Or   promethazine (PHENERGAN) suppository 12.5 mg (has no administration in time range)   aspirin chewable tablet 324 mg (324 mg Oral Given 2/9/22 1724)   clopidogrel (PLAVIX) tablet 300 mg (300 mg Oral Given 2/9/22 1724)   iopamidol (ISOVUE-300) 61 % injection 100 mL (100 mL Intravenous Given 2/9/22 1740)   iopamidol (ISOVUE-300) 61 % injection 50 mL (50 mL Intravenous Given 2/9/22 1740)     Vital Signs:  Temp:  [97.6 °F (36.4 °C)] 97.6 °F (36.4 °C)  Heart Rate:  [62-97] 62  Resp:  [19] 19  BP: (118-142)/(72-95) 118/72        02/09/22  1614   Weight: 54.4 kg (120 lb)     Body mass index is 24.24 kg/m².    Physical Exam:     Most recent vital Signs: /72   Pulse 62   Temp 97.6 °F (36.4 °C) (Oral)   Resp 19   Ht 149.9 cm (59\")   Wt 54.4 kg (120 lb)   SpO2 98%   BMI 24.24 kg/m²     General Appearance:  Alert and cooperative. NAD. Pleasant.   Head:  Atraumatic and normocephalic.   Eyes: Conjunctivae and sclerae normal, no icterus. No pallor.   Throat: No oral lesions, no thrush, oral mucosa moist.   Neck: Supple, trachea midline, no thyromegaly.   Lungs:   Breath sounds heard bilaterally equally.  No wheezing or crackles. No Pleural rub or bronchial breathing.   Heart:  Normal S1 and S2, no murmur, no gallop, no rub.   Abdomen:   Normal bowel sounds, no masses, no organomegaly. " Soft, nontender, nondistended, no rebound tenderness.   Extremities: Supple, no edema, no cyanosis, no clubbing.   Skin: No bleeding or rash.   Neurologic: +R facial droop, mild-moderate; other cranial nerves intact. Strength 5/5 in all 4 extremities. Sensation is grossly symmetric. FTN normal b/l.     Laboratory data:    I have reviewed the labs done in the emergency room.    Results from last 7 days   Lab Units 02/09/22  1654   SODIUM mmol/L 138   POTASSIUM mmol/L 4.3   CHLORIDE mmol/L 101   CO2 mmol/L 23.1   BUN mg/dL 19   CREATININE mg/dL 1.21*   CALCIUM mg/dL 9.8   BILIRUBIN mg/dL 0.2   ALK PHOS U/L 111   ALT (SGPT) U/L 17   AST (SGOT) U/L 20   GLUCOSE mg/dL 130*     Results from last 7 days   Lab Units 02/09/22  1654   WBC 10*3/mm3 6.26   HEMOGLOBIN g/dL 12.5   HEMATOCRIT % 37.6   PLATELETS 10*3/mm3 395     Results from last 7 days   Lab Units 02/09/22  1654   INR  0.84*     Results from last 7 days   Lab Units 02/09/22  1654   TROPONIN T ng/mL <0.010     Results from last 7 days   Lab Units 02/09/22  1654   PROBNP pg/mL 20.5                       Invalid input(s): USDES,  BLOODU, NITRITITE, BACT, EP    Pain Management Panel    There is no flowsheet data to display.             Radiology:    CT Angiogram Neck    Result Date: 2/9/2022  FINAL REPORT TECHNIQUE: Thin section axial CT images were performed through the neck after IV contrast.  3-D reconstruction images were submitted. This study was performed with techniques to keep radiation doses as low as reasonably achievable (ALARA). Individualized dose reduction techniques using automated exposure control or adjustment of mA and/or kV according to the patient's size were employed. CLINICAL HISTORY: stroke FINDINGS: CTA neck:  NASCET technique utilized for stenosis evaluation. Right carotid: No significant stenosis is seen of the cervical, or internal carotid artery.  Left carotid: No significant stenosis is seen of the cervical common or internal carotid  artery.  Vertebrals: The right vertebral artery is dominant and the left vertebral artery is diminutive but patent.  No significant stenosis is present.     No significant stenosis or occlusion of the cervical common or internal carotid arteries. Authenticated by Emil Urias MD on 02/09/2022 06:23:45 PM    CT Angiogram Head    Result Date: 2/9/2022  FINAL REPORT TECHNIQUE: Multiple axial CT angiography images were performed from the foramen magnum to the vertex before and during IV contrast administration. This study was performed with techniques to keep radiation doses as low as reasonably achievable (ALARA). Individualized dose reduction techniques using automated exposure control or adjustment of mA and/or kV according to the patient's size were employed. CLINICAL HISTORY: stroke FINDINGS: No acute intracranial hemorrhage or large acute cortical infarct.  The brain volume is normal for patient's age. Ventricles are normal in size and configuration.  No midline shift.  The basal cisterns are patent.  No skull fracture.  The visualized paranasal sinuses and mastoid air cells are clear. CTA HEAD: No evidence of vascular injury, hemodynamically significant stenosis or major vessel occlusion.     No acute intracranial hemorrhage or large acute cortical infarct. Authenticated by Emil Urias MD on 02/09/2022 06:22:28 PM    CT Head Without Contrast Stroke Protocol    Result Date: 2/9/2022  PROCEDURE: CT HEAD WO CONTRAST STROKE PROTOCOL-  HISTORY: Neuro deficit, acute, stroke suspected  TECHNIQUE: Noncontrast exam  FINDINGS: Diminished attenuation is noted of the left basal ganglia with an oval hypodensity measuring 13 x 8 mm. This is suspicious for an acute or early subacute large lacunar infarct. Remaining parenchyma is homogeneous without evidence of hemorrhage, mass effect or edema. No extra-axial abnormality is noted. Ventricles and cisterns appear normal.  The visualized sinuses, orbits and petrous temporal  bones appear unremarkable.      Left basal ganglia lacunar infarcts likely acute or subacute in age without hemorrhage   This study was performed with techniques to keep radiation doses as low as reasonably achievable (ALARA). Individualized dose reduction techniques using automated exposure control or adjustment of vA and/or kV according to the patient size were employed.  This report was signed and finalized on 2/9/2022 4:31 PM by Noah Moon MD.    CT CEREBRAL PERFUSION WITH & WITHOUT CONTRAST    Result Date: 2/9/2022  FINAL REPORT TECHNIQUE: Multiple axial CT images were performed from the foramen magnum to the vertex.  Perfusion images were also performed.  Coronal reformatted images were reconstructed from axial data set.This study was performed with techniques to keep radiation doses as low as reasonably achievable, (ALARA). Individualized dose reduction techniques using automated exposure control or adjustment of mA and/or kV according to the patient's size were employed. CLINICAL HISTORY: stroke symptoms FINDINGS: CT PERFUSION HEAD  FINDINGS: There is relatively symmetric cerebral perfusion without significant cerebral blood flow or blood volume or abnormalities specific for significant core infarct.  There is no significant prolongation of the time to max specific for ischemic penumbra.     No specific CT evidence of significant core infarct. Authenticated by Emil Urias MD on 02/09/2022 06:22:01 PM      Assessment/Plan:    Acute/subacute CVA  -appreciate neurology  -continue aspirin, plavix and lipitor  -tele  -echo  -brain mri wo  -pt/ot/slp  -permissive HTN     tho vs ckd  -ns at 125ml/hr  -am bmp    t2dm  -low dose ssi  -a1c, lipd panel    Risk Assessment: moderate  DVT Prophylaxis: heparin subcu  Code Status: full  Diet: npo until cleared by slp      Max Araujo MD  02/09/22  19:57 EST      Electronically signed by Max Araujo MD at 02/09/22 1171          Physical Therapy Notes (last 48 hours)       Alyssa Thompson, PT at 02/10/22 1100  Version 1 of 1       Hold PT eval per neuro; strict bedrest with HOB flat for 24 hours per patient/family report. PT will follow up with patient tomorrow and proceed with eval as tolerated and appropriate.     Electronically signed by Alyssa Thompson, PT at 02/10/22 1219     Alyssa Thompson PT at 22 1450  Version 1 of        Goal Outcome Evaluation:  Plan of Care Reviewed With: patient        Progress: no change  Outcome Summary: PT eval completed. Patient presents with deficits in strength, balance, activity tolerance, safety awareness and independence. She is expected to benefit from continued skilled PT intervention to improve her mobility status prior to D/C.    Electronically signed by Alyssa Thompson PT at 22 1450     Alyssa Thompson PT at 22 1451  Version 1 of          Patient Name: Deborah Jones  : 1950    MRN: [id number]                              Today's Date: 2022       Admit Date: 2022    Visit Dx:     ICD-10-CM ICD-9-CM   1. Cerebrovascular accident (CVA), unspecified mechanism (HCC)  I63.9 434.91     Patient Active Problem List   Diagnosis   • Cerebrovascular accident (CVA) (HCC)     Past Medical History:   Diagnosis Date   • Impaired functional mobility, balance, gait, and endurance      No past surgical history on file.   General Information     Row Name 22 1315          Physical Therapy Time and Intention    Document Type evaluation  -LM     Mode of Treatment physical therapy  -LM     Row Name 22 4797          General Information    Patient Profile Reviewed yes  -LM     Prior Level of Function independent:; all household mobility; community mobility  -LM     Existing Precautions/Restrictions fall  -LM     Barriers to Rehab medically complex  -LM     Row Name 22 1319          Living Environment    Lives With spouse  -LM     Row Name 22 1313          Home Main Entrance    Number of Stairs, Main Entrance  none  -LM     Row Name 02/11/22 1315          Stairs Within Home, Primary    Number of Stairs, Within Home, Primary none  -LM     Row Name 02/11/22 1315          Cognition    Orientation Status (Cognition) oriented x 4  -LM     Row Name 02/11/22 1315          Safety Issues, Functional Mobility    Safety Issues Affecting Function (Mobility) safety precaution awareness; safety precautions follow-through/compliance; awareness of need for assistance  -LM     Impairments Affecting Function (Mobility) coordination; endurance/activity tolerance; strength; motor control  -LM           User Key  (r) = Recorded By, (t) = Taken By, (c) = Cosigned By    Initials Name Provider Type    Alyssa Bryant, PT Physical Therapist               Mobility     Row Name 02/11/22 1315          Bed Mobility    Bed Mobility supine-sit-supine  -LM     Supine-Sit-Supine [place] (Bed Mobility) modified independence  -LM     Assistive Device (Bed Mobility) bed rails; head of bed elevated  -LM     Row Name 02/11/22 1315          Sit-Stand Transfer    Sit-Stand [place] (Transfers) contact guard  -LM     Assistive Device (Sit-Stand Transfers) other (see comments)  gait belt  -LM     Row Name 02/11/22 1315          Gait/Stairs (Locomotion)    [place] Level (Gait) contact guard  -LM     Assistive Device (Gait) other (see comments)  gait belt  -LM     Distance in Feet (Gait) 212'; slow mary and LOB noted; R LE crossover gait at times.  -LM     Deviations/Abnormal Patterns (Gait) base of support, narrow; mary decreased; gait speed decreased; stride length decreased; weight shifting decreased  -LM     Bilateral Gait Deviations heel strike decreased  -LM           User Key  (r) = Recorded By, (t) = Taken By, (c) = Cosigned By    Initials Name Provider Type    Alyssa Bryant PT Physical Therapist               Obj/Interventions     Row Name 02/11/22 1315          Range of Motion Comprehensive    General Range of Motion bilateral  upper extremity ROM WFL; bilateral lower extremity ROM WFL  -LM     Row Name 02/11/22 1315          Strength Comprehensive (MMT)    General Manual Muscle Testing (MMT) Assessment lower extremity strength deficits identified  -LM     Comment, General Manual Muscle Testing (MMT) Assessment R LE 4-/5; L LE 4/5  -LM     Row Name 02/11/22 1315          Balance    Balance Assessment sitting static balance; sitting dynamic balance; standing static balance; standing dynamic balance  -LM     Static Sitting Balance WFL; unsupported; sitting, edge of bed  -LM     Dynamic Sitting Balance WFL; unsupported; sitting, edge of bed  -LM     Static Standing Balance WFL; unsupported  -LM     Dynamic Standing Balance mild impairment; unsupported  -LM           User Key  (r) = Recorded By, (t) = Taken By, (c) = Cosigned By    Initials Name Provider Type    LM Alyssa Thompson, PT Physical Therapist               Goals/Plan     Row Name 02/11/22 1315          Bed Mobility Goal 1 (PT)    Activity/Assistive Device (Bed Mobility Goal 1, PT) bed mobility activities, all  -LM     [place] Level/Cues Needed (Bed Mobility Goal 1, PT) independent  -LM     Time Frame (Bed Mobility Goal 1, PT) long term goal (LTG); 10 days  -LM     Progress/Outcomes (Bed Mobility Goal 1, PT) goal ongoing  -LM     Row Name 02/11/22 1315          Transfer Goal 1 (PT)    Activity/Assistive Device (Transfer Goal 1, PT) sit-to-stand/stand-to-sit; bed-to-chair/chair-to-bed  -LM     [place] Level/Cues Needed (Transfer Goal 1, PT) standby assist  -LM     Time Frame (Transfer Goal 1, PT) long term goal (LTG); 10 days  -LM     Progress/Outcome (Transfer Goal 1, PT) goal ongoing  -LM     Row Name 02/11/22 1315          Gait Training Goal 1 (PT)    Activity/Assistive Device (Gait Training Goal 1, PT) gait (walking locomotion); improve balance and speed; increase endurance/gait distance  -LM     [place] Level (Gait Training Goal 1, PT) standby assist  -LM      Distance (Gait Training Goal 1, PT) 400'  -LM     Time Frame (Gait Training Goal 1, PT) long term goal (LTG); 10 days  -LM     Progress/Outcome (Gait Training Goal 1, PT) goal ongoing  -LM     Row Name 02/11/22 1315          Patient Education Goal (PT)    Activity (Patient Education Goal, PT) Patient will demonstrate independence with HEP.  -LM     [place]/Cues/Accuracy (Memory Goal 2, PT) demonstrates adequately; independent; verbalizes understanding  -LM     Time Frame (Patient Education Goal, PT) long term goal (LTG); 10 days  -LM     Progress/Outcome (Patient Education Goal, PT) goal ongoing  -LM           User Key  (r) = Recorded By, (t) = Taken By, (c) = Cosigned By    Initials Name Provider Type    LM Alyssa Thompson, PT Physical Therapist               Clinical Impression     Row Name 02/11/22 1315          Pain    Additional Documentation Pain Scale: Numbers Pre/Post-Treatment (Group)  -LM     Row Name 02/11/22 1315          Pain Scale: Numbers Pre/Post-Treatment    Pretreatment Pain Rating 3/10  -LM     Posttreatment Pain Rating 2/10  -LM     Pain Location - Orientation lower  -LM     Pain Location back  -LM     Pain Intervention(s) Repositioned; Ambulation/increased activity  -LM     Row Name 02/11/22 1315          Plan of Care Review    Plan of Care Reviewed With patient  -LM     Progress no change  -LM     Outcome Summary PT eval completed. Patient presents with deficits in strength, balance, activity tolerance, safety awareness and independence. She is expected to benefit from continued skilled PT intervention to improve her mobility status prior to D/C.  -LM     Row Name 02/11/22 1315          Therapy Assessment/Plan (PT)    Patient/Family Therapy Goals Statement (PT) Get stronger and return home.  -LM     Rehab Potential (PT) good, to achieve stated therapy goals  -LM     Criteria for Skilled Interventions Met (PT) yes; skilled treatment is necessary; meets criteria  -LM     Row Name 02/11/22 1315           Vital Signs    O2 Delivery Pre Treatment room air  -LM     O2 Delivery Intra Treatment room air  -LM     O2 Delivery Post Treatment room air  -LM     Row Name 02/11/22 1315          Positioning and Restraints    Pre-Treatment Position in bed  -LM     Post Treatment Position bed  -LM     In Bed sitting EOB; encouraged to call for assist; call light within reach  -LM           User Key  (r) = Recorded By, (t) = Taken By, (c) = Cosigned By    Initials Name Provider Type    Alyssa Bryant, PT Physical Therapist               Outcome Measures     Row Name 02/11/22 1315          How much help from another person do you currently need...    Turning from your back to your side while in flat bed without using bedrails? 4  -LM     Moving from lying on back to sitting on the side of a flat bed without bedrails? 4  -LM     Moving to and from a bed to a chair (including a wheelchair)? 3  -LM     Standing up from a chair using your arms (e.g., wheelchair, bedside chair)? 3  -LM     Climbing 3-5 steps with a railing? 3  -LM     To walk in hospital room? 3  -LM     AM-PAC 6 Clicks Score (PT) 20  -LM     Row Name 02/11/22 1447 02/11/22 1315       Functional Assessment    Outcome Measure Options AM-PAC 6 Clicks Daily Activity (OT)  -SD AM-PAC 6 Clicks Basic Mobility (PT)  -LM          User Key  (r) = Recorded By, (t) = Taken By, (c) = Cosigned By    Initials Name Provider Type    Alyssa Bryant, PT Physical Therapist    Alia Salas OT Occupational Therapist                             Physical Therapy Education                 Title: PT OT SLP Therapies (In Progress)     Topic: Physical Therapy (Done)     Point: Mobility training (Done)     Learning Progress Summary           Patient Acceptance, E,TB, VU by  at 2/11/2022 1448    Comment: Purpose of PT/POC.                   Point: Home exercise program (Done)     Learning Progress Summary           Patient Acceptance, E,TB, VU by  at 2/11/2022 9195     Comment: Purpose of PT/POC.                   Point: Precautions (Done)     Learning Progress Summary           Patient Acceptance, E,TB, VU by  at 2022 1449    Comment: Purpose of PT/POC.                               User Key     Initials Effective Dates Name Provider Type Discipline     21 -  Alyssa Thompson, PT Physical Therapist PT              PT Recommendation and Plan  Planned Therapy Interventions (PT): balance training, bed mobility training, gait training, home exercise program, strengthening, patient/family education, neuromuscular re-education, transfer training  Plan of Care Reviewed With: patient  Progress: no change  Outcome Summary: PT eval completed. Patient presents with deficits in strength, balance, activity tolerance, safety awareness and independence. She is expected to benefit from continued skilled PT intervention to improve her mobility status prior to D/C.     Time Calculation:    PT Charges     Row Name 22 1451             Time Calculation    Start Time 1315  -LM      PT Received On 22  -      PT Goal Re-Cert Due Date 22  -            User Key  (r) = Recorded By, (t) = Taken By, (c) = Cosigned By    Initials Name Provider Type     Alyssa Thompson, PT Physical Therapist              Therapy Charges for Today     Code Description Service Date Service Provider Modifiers Qty    69968470419 HC PT EVAL LOW COMPLEXITY 3 2022 Alyssa Thompson, PT GP 1          PT G-Codes  Outcome Measure Options: AM-PAC 6 Clicks Daily Activity (OT)  AM-PAC 6 Clicks Score (PT): 20  AM-PAC 6 Clicks Score (OT): 18    Alyssa Thompson PT  2022      Electronically signed by Alyssa Thompson, PT at 22 1452          Occupational Therapy Notes (last 48 hours)      Alia Santoyo, OT at 22 1449          Patient Name: Deborah Jones  : 1950    MRN: [id number]                              Today's Date: 2022       Admit Date: 2022    Visit Dx:     ICD-10-CM  ICD-9-CM   1. Cerebrovascular accident (CVA), unspecified mechanism (HCC)  I63.9 434.91     Patient Active Problem List   Diagnosis   • Cerebrovascular accident (CVA) (HCC)     Past Medical History:   Diagnosis Date   • Impaired functional mobility, balance, gait, and endurance      No past surgical history on file.   General Information     Row Name 02/11/22 1437          OT Time and Intention    Document Type evaluation  -SD     Mode of Treatment occupational therapy  -SD     Row Name 02/11/22 1437          General Information    Patient Profile Reviewed yes  -SD     Prior Level of Function independent:; all household mobility; community mobility  -SD     Existing Precautions/Restrictions fall  -SD     Barriers to Rehab medically complex  -SD     Row Name 02/11/22 1437          Living Environment    Lives With spouse  -SD     Row Name 02/11/22 1437          Home Main Entrance    Stair Railings, Main Entrance none  -SD     Row Name 02/11/22 1437          Stairs Within Home, Primary    Number of Stairs, Within Home, Primary none  -SD     Row Name 02/11/22 1437          Cognition    Orientation Status (Cognition) oriented x 4  -SD     Row Name 02/11/22 1437          Safety Issues, Functional Mobility    Safety Issues Affecting Function (Mobility) safety precautions follow-through/compliance; awareness of need for assistance  -SD     Impairments Affecting Function (Mobility) coordination; endurance/activity tolerance; strength  -SD           User Key  (r) = Recorded By, (t) = Taken By, (c) = Cosigned By    Initials Name Provider Type    Alia Salas OT Occupational Therapist                 Mobility/ADL's     Row Name 02/11/22 1438          Bed Mobility    Bed Mobility supine-sit; sit-supine  -SD     Supine-Sit [place] (Bed Mobility) modified independence  -SD     Sit-Supine [place] (Bed Mobility) modified independence  -SD     Assistive Device (Bed Mobility) bed rails; head of bed elevated  -SD      Row Name 02/11/22 1438          Transfers    Transfers sit-stand transfer  -SD     Sit-Stand [place] (Transfers) contact guard  -SD     [facility] Name 02/11/22 1438          Sit-Stand Transfer    Assistive Device (Sit-Stand Transfers) other (see comments)  gait belt  -SD     [facility] Name 02/11/22 1438          Functional Mobility    Functional Mobility- Ind. Level contact guard assist  -SD     Functional Mobility- Device other (see comments)  gait belt  -SD     Functional Mobility-Distance (Feet) 212  -SD     Functional Mobility- Safety Issues balance decreased during turns; step length decreased; weight-shifting ability decreased  -SD     [facility] Name 02/11/22 1438          Activities of Daily Living    BADL Assessment/Intervention bathing; upper body dressing; lower body dressing; grooming; feeding; toileting  -SD     [facility] Name 02/11/22 1438          Bathing Assessment/Intervention    [place] Level (Bathing) minimum assist (75% patient effort)  -SD     Row Name 02/11/22 1438          Upper Body Dressing Assessment/Training    [place] Level (Upper Body Dressing) standby assist  -SD     Row Name 02/11/22 1438          Lower Body Dressing Assessment/Training    [place] Level (Lower Body Dressing) don; pants/bottoms; contact guard assist  -SD     [facility] Name 02/11/22 1438          Grooming Assessment/Training    [place] Level (Grooming) supervision  -SD     Row Name 02/11/22 1438          Self-Feeding Assessment/Training    [place] Level (Feeding) supervision  -SD     Row Name 02/11/22 1438          Toileting Assessment/Training    [place] Level (Toileting) contact guard assist  -SD     Assistive Devices (Toileting) commode  -SD           User Key  (r) = Recorded By, (t) = Taken By, (c) = Cosigned By    Initials Name Provider Type    Alia Salas OT Occupational Therapist               Obj/Interventions     Row Name 02/11/22 1439          Sensory Assessment (Somatosensory)    Sensory  Assessment (Somatosensory) sensation intact  -SD     [facility] Name 02/11/22 1439          Vision Assessment/Intervention    Visual Impairment/Limitations WFL  -SD     Row Name 02/11/22 1439          Range of Motion Comprehensive    General Range of Motion bilateral upper extremity ROM WFL  -SD     Row Name 02/11/22 1439          Strength Comprehensive (MMT)    General Manual Muscle Testing (MMT) Assessment upper extremity strength deficits identified  -SD     Comment, General Manual Muscle Testing (MMT) Assessment RUE 4-/5 LUE 4/5  -SD     Row Name 02/11/22 1439          Motor Skills    Motor Skills coordination; writing  -SD     Coordination fine motor deficit; minimal impairment; right; other (see comments)  patient able to sign name, but slow  -SD     [facility] Name 02/11/22 1439          Writing Skills    Writing Skills mild impairment; dominant hand; words  -SD           User Key  (r) = Recorded By, (t) = Taken By, (c) = Cosigned By    Initials Name Provider Type    SELENA Santoyo, Alia, OT Occupational Therapist               Goals/Plan     Row Name 02/11/22 1446          Transfer Goal 1 (OT)    Activity/Assistive Device (Transfer Goal 1, OT) sit-to-stand/stand-to-sit  -SD     [place] Level/Cues Needed (Transfer Goal 1, OT) standby assist  -SD     Time Frame (Transfer Goal 1, OT) long term goal (LTG)  -SD     Progress/Outcome (Transfer Goal 1, OT) goal ongoing  -SD     Row Name 02/11/22 1446          Dressing Goal 1 (OT)    Activity/Device (Dressing Goal 1, OT) dressing skills, all  -SD     [place]/Cues Needed (Dressing Goal 1, OT) standby assist  -SD     Time Frame (Dressing Goal 1, OT) 2 weeks  -SD     Progress/Outcome (Dressing Goal 1, OT) goal ongoing  -SD     Row Name 02/11/22 1446          Toileting Goal 1 (OT)    Activity/Device (Toileting Goal 1, OT) toileting skills, all; commode  -SD     [place] Level/Cues Needed (Toileting Goal 1, OT) standby assist  -SD     Time Frame (Toileting Goal 1, OT) 2  weeks  -SD     Progress/Outcome (Toileting Goal 1, OT) goal ongoing  -SD     Row Name 02/11/22 1446          Strength Goal 1 (OT)    Strength Goal 1 (OT) Patient to perform UB ther ex/FMC tasks in order to improve handwriting skills  -SD     Time Frame (Strength Goal 1, OT) long term goal (LTG)  -SD     Progress/Outcome (Strength Goal 1, OT) goal ongoing  -SD     Row Name 02/11/22 1446          Therapy Assessment/Plan (OT)    Planned Therapy Interventions (OT) activity tolerance training; adaptive equipment training; BADL retraining; patient/caregiver education/training; ROM/therapeutic exercise; strengthening exercise; transfer/mobility retraining  -SD           User Key  (r) = Recorded By, (t) = Taken By, (c) = Cosigned By    Initials Name Provider Type    Alia Salas OT Occupational Therapist               Clinical Impression     Row Name 02/11/22 1442          Pain Assessment    Additional Documentation Pain Scale: Numbers Pre/Post-Treatment (Group)  -SD     Row Name 02/11/22 1442          Pain Scale: Numbers Pre/Post-Treatment    Pretreatment Pain Rating 3/10  -SD     Posttreatment Pain Rating 2/10  -SD     Pain Location - Orientation lower  -SD     Pain Location back  -SD     Pain Intervention(s) Repositioned; Ambulation/increased activity  -SD     Row Name 02/11/22 1442          Plan of Care Review    Plan of Care Reviewed With patient  -SD     Progress no change  -SD     Outcome Summary OT eval completed. Patient presents deficits in strength, coordination, balance, mobility and ADL performance. Patient completed bed mobility with modified independence, transfer and functional mobility with CGA. Patient demonstrates mild coordination deficits on R, noted during handwriting task and demonstrates decreased strength of RUE. Patient requires CGA overall for ADLs. Patient is expected to benefit from continued OT services prior to DC. Patient wishes to DC to [facility] for STR.  -SD     Row Name  02/11/22 1442          Therapy Assessment/Plan (OT)    Patient/Family Therapy Goal Statement (OT) Increase strength and mobility  -SD     Rehab Potential (OT) good, to achieve stated therapy goals  -SD     Criteria for Skilled Therapeutic Interventions Met (OT) skilled treatment is necessary  -SD     Therapy Frequency (OT) daily  Mon-Fri  -SD     Row Name 02/11/22 1442          Therapy Plan Review/Discharge Plan (OT)    Anticipated Discharge Disposition (OT) inpatient rehabilitation facility  -SD     Row Name 02/11/22 1442          Positioning and Restraints    Pre-Treatment Position in bed  -SD     Post Treatment Position bed  -SD     In Bed sitting EOB; encouraged to call for assist; call light within reach  -SD           User Key  (r) = Recorded By, (t) = Taken By, (c) = Cosigned By    Initials Name Provider Type    Alia Salas OT Occupational Therapist               Outcome Measures     Row Name 02/11/22 1447          How much help from another is currently needed...    Putting on and taking off regular lower body clothing? 3  -SD     Bathing (including washing, rinsing, and drying) 3  -SD     Toileting (which includes using toilet bed pan or urinal) 3  -SD     Putting on and taking off regular upper body clothing 3  -SD     Taking care of personal grooming (such as brushing teeth) 3  -SD     Eating meals 3  -SD     AM-PAC 6 Clicks Score (OT) 18  -SD     Row Name 02/11/22 1315          How much help from another person do you currently need...    Turning from your back to your side while in flat bed without using bedrails? 4  -LM     Moving from lying on back to sitting on the side of a flat bed without bedrails? 4  -LM     Moving to and from a bed to a chair (including a wheelchair)? 3  -LM     Standing up from a chair using your arms (e.g., wheelchair, bedside chair)? 3  -LM     Climbing 3-5 steps with a railing? 3  -LM     To walk in hospital room? 3  -LM     AM-PAC 6 Clicks Score (PT) 20  -LM      Row Name 02/11/22 1447 02/11/22 1315       Functional Assessment    Outcome Measure Options AM-PAC 6 Clicks Daily Activity (OT)  -SD AM-PAC 6 Clicks Basic Mobility (PT)  -LM          User Key  (r) = Recorded By, (t) = Taken By, (c) = Cosigned By    Initials Name Provider Type    LM Alyssa Thompson, PT Physical Therapist    Alia Salas OT Occupational Therapist                Occupational Therapy Education                 Title: PT OT SLP Therapies (In Progress)     Topic: Occupational Therapy (In Progress)     Point: ADL training (Done)     Description:   Instruct learner(s) on proper safety adaptation and remediation techniques during self care or transfers.   Instruct in proper use of assistive devices.              Learning Progress Summary           Patient Acceptance, E,TB, VU by SD at 2/11/2022 1446    Comment: Benefit of OT; OT POC                   Point: Home exercise program (Not Started)     Description:   Instruct learner(s) on appropriate technique for monitoring, assisting and/or progressing therapeutic exercises/activities.              Learner Progress:  Not documented in this visit.          Point: Precautions (Not Started)     Description:   Instruct learner(s) on prescribed precautions during self-care and functional transfers.              Learner Progress:  Not documented in this visit.          Point: Body mechanics (Not Started)     Description:   Instruct learner(s) on proper positioning and spine alignment during self-care, functional mobility activities and/or exercises.              Learner Progress:  Not documented in this visit.                      User Key     Initials Effective Dates Name Provider Type Discipline    SD 06/16/21 -  Alia Santoyo OT Occupational Therapist OT              OT Recommendation and Plan  Planned Therapy Interventions (OT): activity tolerance training, adaptive equipment training, BADL retraining, patient/caregiver education/training,  ROM/therapeutic exercise, strengthening exercise, transfer/mobility retraining  Therapy Frequency (OT): daily (Mon-Fri)  Plan of Care Review  Plan of Care Reviewed With: patient  Progress: no change  Outcome Summary: OT eval completed. Patient presents deficits in strength, coordination, balance, mobility and ADL performance. Patient completed bed mobility with modified independence, transfer and functional mobility with CGA. Patient demonstrates mild coordination deficits on R, noted during handwriting task and demonstrates decreased strength of RUE. Patient requires CGA overall for ADLs. Patient is expected to benefit from continued OT services prior to DC. Patient wishes to DC to [facility] for STR.     Time Calculation:    Time Calculation- OT     Row Name 02/11/22 1449             Time Calculation- OT    OT Start Time 1330  -SD      OT Received On 02/11/22  -SD      OT Goal Re-Cert Due Date 02/23/22  -SD              Untimed Charges    OT Eval/Re-eval Minutes 30  -SD              Total Minutes    Untimed Charges Total Minutes 30  -SD       Total Minutes 30  -SD            User Key  (r) = Recorded By, (t) = Taken By, (c) = Cosigned By    Initials Name Provider Type    Alia Salas OT Occupational Therapist              Therapy Charges for Today     Code Description Service Date Service Provider Modifiers Qty    42369998288  OT EVAL LOW COMPLEXITY 2 2/11/2022 Alia Santoyo OT GO 1               Alia Santoyo OT  2/11/2022    Electronically signed by Alia Santoyo OT at 02/11/22 1450     Alia Santoyo OT at 02/11/22 1448        Goal Outcome Evaluation:  Plan of Care Reviewed With: patient        Progress: no change  Outcome Summary: OT eval completed. Patient presents deficits in strength, coordination, balance, mobility and ADL performance. Patient completed bed mobility with modified independence, transfer and functional mobility with CGA. Patient demonstrates mild coordination  deficits on R, noted during handwriting task and demonstrates decreased strength of RUE. Patient requires CGA overall for ADLs. Patient is expected to benefit from continued OT services prior to DC. Patient wishes to DC to [facility] for STR.    Electronically signed by Alia Santoyo OT at 02/11/22 5414     Alia Santoyo OT at 02/10/22 1203        Hold OT eval per neuro; strict bedrest with head of bed flat for 24 hours per patient/family reporting; will follow up tomorrow as appropriate.     Electronically signed by Alia Santoyo OT at 02/10/22 1216

## 2022-02-12 NOTE — THERAPY TREATMENT NOTE
Patient Name: Deborah Jones  : 1950    MRN: [id number]                              Today's Date: 2022       Admit Date: 2022    Visit Dx:     ICD-10-CM ICD-9-CM   1. Cerebrovascular accident (CVA), unspecified mechanism (HCC)  I63.9 434.91     Patient Active Problem List   Diagnosis   • Cerebrovascular accident (CVA) (HCC)     Past Medical History:   Diagnosis Date   • Impaired functional mobility, balance, gait, and endurance      No past surgical history on file.   General Information     Row Name 22 1605          Physical Therapy Time and Intention    Document Type therapy note (daily note)  -     Mode of Treatment physical therapy  -     Row Name 22 1605          General Information    Patient Profile Reviewed yes  -     Existing Precautions/Restrictions fall  -     Row Name 22 1605          Cognition    Orientation Status (Cognition) oriented x 4  -CC     Row Name 22 1605          Safety Issues, Functional Mobility    Safety Issues Affecting Function (Mobility) awareness of need for assistance; insight into deficits/self-awareness  -     Impairments Affecting Function (Mobility) coordination; endurance/activity tolerance; strength  -           User Key  (r) = Recorded By, (t) = Taken By, (c) = Cosigned By    Initials Name Provider Type     Cely South, ANDREW Physical Therapy Assistant               Mobility     Row Name 22 1608          Bed Mobility    Bed Mobility supine-sit; sit-supine  -     Supine-Sit [place] (Bed Mobility) modified independence  -     Sit-Supine [place] (Bed Mobility) modified independence  -     Assistive Device (Bed Mobility) bed rails  -     Row Name 22 1608          Sit-Stand Transfer    Sit-Stand [place] (Transfers) contact guard; standby assist  -     Assistive Device (Sit-Stand Transfers) other (see comments)  gait belt  -     Row Name 22 1608          Gait/Stairs (Locomotion)     [place] Level (Gait) contact guard; standby assist; verbal cues  -     Assistive Device (Gait) other (see comments)  gait belt  -     Distance in Feet (Gait) 250  -     Deviations/Abnormal Patterns (Gait) base of support, narrow; mary decreased; gait speed decreased; stride length decreased; weight shifting decreased  -     Bilateral Gait Deviations heel strike decreased  -     Comment (Gait/Stairs) slow mary and LOB noted; R LE crossover gait at times with 5 reps of unsteadiness/path deviations noted  -           User Key  (r) = Recorded By, (t) = Taken By, (c) = Cosigned By    Initials Name Provider Type     Cely South PTA Physical Therapy Assistant               Obj/Interventions     Row Name 02/12/22 1612          Strength Comprehensive (MMT)    General Manual Muscle Testing (MMT) Assessment lower extremity strength deficits identified  -     Comment, General Manual Muscle Testing (MMT) Assessment Pt performed B LE ex in standing hip abd, marching, hip flex, heel raisies 10 reps with 1 UE support.  -           User Key  (r) = Recorded By, (t) = Taken By, (c) = Cosigned By    Initials Name Provider Type     Cely South, ANDREW Physical Therapy Assistant               Goals/Plan    No documentation.                Clinical Impression     Row Name 02/12/22 1615          Pain    Additional Documentation Pain Scale: Numbers Pre/Post-Treatment (Group)  -     Row Name 02/12/22 1615          Pain Scale: Numbers Pre/Post-Treatment    Pretreatment Pain Rating 0/10 - no pain  -     Posttreatment Pain Rating 0/10 - no pain  -     Row Name 02/12/22 1615          Plan of Care Review    Plan of Care Reviewed With patient  -     Progress improving  -     Outcome Summary Pt performed B LE ex in standing hip abd, marching, hip flex, heel raisies 10 reps with 1 UE support. Pt ambs with decreaced mary and LOB noted; R LE crossover gait at times with 5 reps of unsteadiness/path  deviations noted. Pt performs bed mob mod I. Pt appears with some R side negelect as pt bumps R arm/elb on wall especially during turns at doorway with turn to R. Con't with PT POC and progressing as tolerated  -     Row Name 02/12/22 1615          Positioning and Restraints    Pre-Treatment Position in bed  -CC     Post Treatment Position bed  bed in chair position  -CC     In Bed encouraged to call for assist; call light within reach  -           User Key  (r) = Recorded By, (t) = Taken By, (c) = Cosigned By    Initials Name Provider Type     Cely South PTA Physical Therapy Assistant               Outcome Measures     Row Name 02/12/22 1624          How much help from another person do you currently need...    Turning from your back to your side while in flat bed without using bedrails? 4  -CC     Moving from lying on back to sitting on the side of a flat bed without bedrails? 4  -CC     Moving to and from a bed to a chair (including a wheelchair)? 3  -CC     Standing up from a chair using your arms (e.g., wheelchair, bedside chair)? 3  -CC     Climbing 3-5 steps with a railing? 2  -CC     To walk in hospital room? 3  -CC     AM-PAC 6 Clicks Score (PT) 19  -CC     Row Name 02/12/22 1624          Functional Assessment    Outcome Measure Options AM-PAC 6 Clicks Basic Mobility (PT)  -           User Key  (r) = Recorded By, (t) = Taken By, (c) = Cosigned By    Initials Name Provider Type    Cely Rm PTA Physical Therapy Assistant                             Physical Therapy Education                 Title: PT OT SLP Therapies (In Progress)     Topic: Physical Therapy (Done)     Point: Mobility training (Done)     Learning Progress Summary           Patient Acceptance, E,TB, VU by CC at 2/12/2022 1624    Comment: Perform amb and increase daily    Acceptance, E,TB, VU by  at 2/11/2022 1449    Comment: Purpose of PT/POC.                   Point: Home exercise program (Done)     Learning  Progress Summary           Patient Acceptance, E,TB, VU by  at 2/11/2022 1449    Comment: Purpose of PT/POC.                   Point: Precautions (Done)     Learning Progress Summary           Patient Acceptance, E,TB, VU by  at 2/11/2022 1449    Comment: Purpose of PT/POC.                               User Key     Initials Effective Dates Name Provider Type Discipline     06/16/21 -  Alyssa Thompson, PT Physical Therapist PT     06/16/21 -  Cely South PTA Physical Therapy Assistant PT              PT Recommendation and Plan     Plan of Care Reviewed With: patient  Progress: improving  Outcome Summary: Pt performed B LE ex in standing hip abd, marching, hip flex, heel raisies 10 reps with 1 UE support. Pt ambs with decreaced mary and LOB noted; R LE crossover gait at times with 5 reps of unsteadiness/path deviations noted. Pt performs bed mob mod I. Pt appears with some R side negelect as pt bumps R arm/elb on wall especially during turns at doorway with turn to R. Con't with PT POC and progressing as tolerated     Time Calculation:    PT Charges     Row Name 02/12/22 1626             Time Calculation    PT Received On 02/12/22  -CC      PT Goal Re-Cert Due Date 02/21/22  -CC              Time Calculation- PT    Total Timed Code Minutes- PT 45 minute(s)  -CC              Timed Charges    94154 - PT Therapeutic Exercise Minutes 15  -CC      61846 - Gait Training Minutes  20  -CC      86155 - PT Therapeutic Activity Minutes 10  -CC              Total Minutes    Timed Charges Total Minutes 45  -CC       Total Minutes 45  -CC            User Key  (r) = Recorded By, (t) = Taken By, (c) = Cosigned By    Initials Name Provider Type    CC Cely South PTA Physical Therapy Assistant              Therapy Charges for Today     Code Description Service Date Service Provider Modifiers Qty    57526524591 HC PT THER PROC EA 15 MIN 2/12/2022 Cely South PTA GP 1    42131275839 HC GAIT TRAINING EA 15  MIN 2/12/2022 Cely South, PTA GP 1    68179447741 HC PT THERAPEUTIC ACT EA 15 MIN 2/12/2022 Cely South, ANDREW GP 1          PT G-Codes  Outcome Measure Options: AM-PAC 6 Clicks Basic Mobility (PT)  AM-PAC 6 Clicks Score (PT): 19  AM-PAC 6 Clicks Score (OT): 18    Cely South PTA  2/12/2022

## 2022-02-12 NOTE — CASE MANAGEMENT/SOCIAL WORK
Discharge Planning Assessment  [facility]     Patient Name: Deborah Jones  MRN: [id number]  Today's Date: 2/12/2022    Admit Date: 2/9/2022     Discharge Needs Assessment    No documentation.                Discharge Plan     Row Name 02/12/22 0929       Plan    Plan Comments faxed PT [facility] notes   to [facility] - Admitted Since 2/9/2022     Destination     Service Provider Request Status Selected Services Address Phone Fax Patient Preferred    [facility]  Accepted N/A [address] [facility] [phone] --       Internal Comment last updated by Evelyn Guido RN 2/11/2022 1113    Accepted clinically- must have PT/OT notes before they can submit to insurance for pre-cert. 2/11/22 Rossy Guido RN                         Expected Discharge Date and Time     Expected Discharge Date Expected Discharge Time    Feb 14, 2022          Demographic Summary    No documentation.                Functional Status    No documentation.                Psychosocial    No documentation.                Abuse/Neglect    No documentation.                Legal    No documentation.                Substance Abuse    No documentation.                Patient Forms    No documentation.                   Bianca Wooten, RN

## 2022-02-12 NOTE — PROGRESS NOTES
[facility]    Name:  Deborah Jones   Age:  71 y.o.  Sex:  female  :  1950  MRN:  [id number]   Visit Number:  [id number]  Admission Date:  2022  Date Of Service:  22  Primary Care Physician:  Joseluis Keyes MD     LOS: 1 day :    Chief Complaint:      Follow-up CVA    Subjective:    Patient seen at bedside again today.  No acute changes overnight.  She notes she did fairly well with walking with therapy, however had a tendency to have some drift and balance issues.  She was agreeable for inpatient rehab at [facility] Course:    Patient is a 71-year-old female with history of type 2 diabetes, presented from home with complaints of apparent right sided facial droop, lower extremity weakness that had started at 1430 on 2022.  Initial work-up in the emergency room was concerning for CVA, with CT head without contrast demonstrating a left basal ganglia lacunar infarct acute versus subacute.  CT cerebral perfusion unremarkable.  CT angio head neck unremarkable.  Labs were otherwise largely unremarkable.  She was given loading dose of Plavix and 325 mg of aspirin and teleneurologist consulted on presentation.  Patient was admitted for observation and work-up.    MRI of the brain today consistent with acute stroke in left basal ganglia region.  Seen by speech therapy and cleared for diet.  Teleneurology recommending 24-hour bedrest at this time.  Continue on aspirin, Plavix, Lipitor.  Diabetic/cardiac diet.  2D echo.    Echo findings with normal EF, negative bubble study, with grade 2 diastolic dysfunction.  Telemetry monitoring thus far with no evidence of atrial fibrillation or arrhythmia.  Neurology recommending dual antiplatelet therapy in addition to statin therapy.  Patient will also likely benefit from cardiac monitoring on outpatient basis for the next 30 days to rule out cardioembolic arrhythmia.  Looking at potential  inpatient rehab pending PT and OT evaluations.    Review of Systems:     All systems were reviewed and negative except as mentioned in subjective, assessment and plan.    Vital Signs:    Temp:  [97 °F (36.1 °C)-98.4 °F (36.9 °C)] 97.1 °F (36.2 °C)  Heart Rate:  [58-71] 58  Resp:  [14-19] 18  BP: (106-151)/(51-97) 106/78    Intake and output:    I/O last 3 completed shifts:  In: 840 [P.O.:840]  Out: 1100 [Urine:1100]  I/O this shift:  In: 720 [P.O.:720]  Out: -     Physical Examination:    General Appearance:  Alert and cooperative.  No acute distress   Head:  Atraumatic and normocephalic.   Eyes: Conjunctivae and sclerae normal, no icterus. No pallor.   Throat: No oral lesions, no thrush, oral mucosa moist.   Neck: Supple, trachea midline, no thyromegaly.   Lungs:   Breath sounds heard bilaterally equally.  No wheezing or crackles. No Pleural rub or bronchial breathing.   Heart:  Normal S1 and S2, no murmur, no gallop, no rub. No JVD.   Abdomen:   Normal bowel sounds, no masses, no organomegaly. Soft, nontender, nondistended, no rebound tenderness.   Extremities: Supple, no edema, no cyanosis, no clubbing.   Skin: No bleeding or rash.   Neurologic: Alert and oriented x 3.  Facial droop present on the right.  Dysarthria     Laboratory results:    Results from last 7 days   Lab Units 02/11/22  0524 02/10/22  0545 02/09/22  1654   SODIUM mmol/L 140 140 138   POTASSIUM mmol/L 4.0 4.2 4.3   CHLORIDE mmol/L 110* 110* 101   CO2 mmol/L 19.6* 20.8* 23.1   BUN mg/dL 14 16 19   CREATININE mg/dL 1.14* 1.10* 1.21*   CALCIUM mg/dL 8.8 8.9 9.8   BILIRUBIN mg/dL  --   --  0.2   ALK PHOS U/L  --   --  111   ALT (SGPT) U/L  --   --  17   AST (SGOT) U/L  --   --  20   GLUCOSE mg/dL 140* 145* 130*     Results from last 7 days   Lab Units 02/10/22  0545 02/09/22  1654   WBC 10*3/mm3 5.59 6.26   HEMOGLOBIN g/dL 12.1 12.5   HEMATOCRIT % 37.9 37.6   PLATELETS 10*3/mm3 360 395     Results from last 7 days   Lab Units 02/09/22  1654   INR   0.84*     Results from last 7 days   Lab Units 02/09/22  1654   TROPONIN T ng/mL <0.010             I have reviewed the patient's laboratory results.    Radiology results:    No radiology results from the last 24 hrs  I have reviewed the patient's radiology reports.    Medication Review:     I have reviewed the patient's active and prn medications.     Problem List:      Cerebrovascular accident (CVA) ([facility])      Assessment:    1. Basal ganglia stroke, present on admission  2. Type 2 diabetes  3. Hyperlipidemia  4. Mild renal insufficiency    Plan:    Patient remains hemodynamically stable.  Blood pressures have been appropriate.  She remains on aspirin and Plavix, continue with statin per neurology recommendations.  Continue with PT and OT.  Case management has seen in referral for [facility] evaluation.  Likely would not hear anything until Monday.    DVT Prophylaxis: Heparin  Code Status: Full  Diet: Carb consistent/cardiac  Discharge Plan: 1 to 2 days to rehab facility    Belle Haque DO  02/12/22  13:29 EST    Dictated utilizing Dragon dictation.

## 2022-02-13 LAB
GLUCOSE BLDC GLUCOMTR-MCNC: 101 MG/DL (ref 70–130)
GLUCOSE BLDC GLUCOMTR-MCNC: 148 MG/DL (ref 70–130)
GLUCOSE BLDC GLUCOMTR-MCNC: 175 MG/DL (ref 70–130)
GLUCOSE BLDC GLUCOMTR-MCNC: 84 MG/DL (ref 70–130)

## 2022-02-13 PROCEDURE — 97116 GAIT TRAINING THERAPY: CPT

## 2022-02-13 PROCEDURE — 25010000002 HEPARIN (PORCINE) PER 1000 UNITS: Performed by: INTERNAL MEDICINE

## 2022-02-13 PROCEDURE — 82962 GLUCOSE BLOOD TEST: CPT

## 2022-02-13 PROCEDURE — 99224 PR SBSQ OBSERVATION CARE/DAY 15 MINUTES: CPT | Performed by: FAMILY MEDICINE

## 2022-02-13 PROCEDURE — G0378 HOSPITAL OBSERVATION PER HR: HCPCS

## 2022-02-13 PROCEDURE — 97530 THERAPEUTIC ACTIVITIES: CPT

## 2022-02-13 PROCEDURE — 97110 THERAPEUTIC EXERCISES: CPT

## 2022-02-13 PROCEDURE — 96372 THER/PROPH/DIAG INJ SC/IM: CPT

## 2022-02-13 RX ADMIN — HEPARIN SODIUM 5000 UNITS: 5000 INJECTION, SOLUTION INTRAVENOUS; SUBCUTANEOUS at 08:30

## 2022-02-13 RX ADMIN — ATORVASTATIN CALCIUM 80 MG: 80 TABLET, FILM COATED ORAL at 20:41

## 2022-02-13 RX ADMIN — HEPARIN SODIUM 5000 UNITS: 5000 INJECTION, SOLUTION INTRAVENOUS; SUBCUTANEOUS at 20:41

## 2022-02-13 RX ADMIN — METFORMIN HYDROCHLORIDE 500 MG: 500 TABLET, FILM COATED ORAL at 08:30

## 2022-02-13 RX ADMIN — ASPIRIN 81 MG CHEWABLE TABLET 81 MG: 81 TABLET CHEWABLE at 08:30

## 2022-02-13 RX ADMIN — CLOPIDOGREL BISULFATE 75 MG: 75 TABLET ORAL at 08:30

## 2022-02-13 RX ADMIN — GLIPIZIDE 5 MG: 5 TABLET ORAL at 06:20

## 2022-02-13 NOTE — CASE MANAGEMENT/SOCIAL WORK
Continued Stay Note  [facility]     Patient Name: Deborah Jones  MRN: [id number]  Today's Date: 2/13/2022    Admit Date: 2/9/2022     Discharge Plan     Row Name 02/13/22 1051       Plan    Plan Per Doni at [facility] insurance has denied acute rehab they are requesting a peer to peer [phone] called spoke to [facility] ref number for phone call [phone] she reports that the ur department will call the number I gave [phone] informed  Dr Haque of this    Received call for care cetrixs with [facility] to schedule peer to peer it has been scheduled between 10 -1030 with Dr Molina with Dr Haque                Discharge Codes    No documentation.               Expected Discharge Date and Time     Expected Discharge Date Expected Discharge Time    Feb 14, 2022             Bianca Rod RN

## 2022-02-13 NOTE — THERAPY TREATMENT NOTE
Patient Name: Deborah Jones  : 1950    MRN: [id number]                              Today's Date: 2022       Admit Date: 2022    Visit Dx:     ICD-10-CM ICD-9-CM   1. Cerebrovascular accident (CVA), unspecified mechanism (HCC)  I63.9 434.91     Patient Active Problem List   Diagnosis   • Cerebrovascular accident (CVA) (HCC)     Past Medical History:   Diagnosis Date   • Impaired functional mobility, balance, gait, and endurance      No past surgical history on file.   General Information     Row Name 22          Physical Therapy Time and Intention    Document Type therapy note (daily note)  -     Mode of Treatment physical therapy  -     Row Name 22          General Information    Patient Profile Reviewed yes  -     Existing Precautions/Restrictions fall  -     Row Name 22          Cognition    Orientation Status (Cognition) oriented x 4  -     Row Name 22          Safety Issues, Functional Mobility    Safety Issues Affecting Function (Mobility) awareness of need for assistance; insight into deficits/self-awareness  -     Impairments Affecting Function (Mobility) coordination; endurance/activity tolerance; strength  -           User Key  (r) = Recorded By, (t) = Taken By, (c) = Cosigned By    Initials Name Provider Type     Cely South, ANDREW Physical Therapy Assistant               Mobility     Row Name 22          Bed Mobility    Bed Mobility supine-sit; sit-supine  -     Supine-Sit [place] (Bed Mobility) modified independence  -     Sit-Supine [place] (Bed Mobility) modified independence  -     Assistive Device (Bed Mobility) bed rails  -     Row Name 22          Sit-Stand Transfer    Sit-Stand [place] (Transfers) contact guard; standby assist  -     Assistive Device (Sit-Stand Transfers) other (see comments)  gait belt  -     Row Name 22          Gait/Stairs (Locomotion)     [place] Level (Gait) contact guard; standby assist; verbal cues  -     Assistive Device (Gait) other (see comments)  gait belt  -     Distance in Feet (Gait) 250  -CC     Deviations/Abnormal Patterns (Gait) base of support, narrow; mary decreased; gait speed decreased; stride length decreased; weight shifting decreased  -     Bilateral Gait Deviations heel strike decreased  -CC     Comment (Gait/Stairs) slow mary and LOB noted; R LE crossover gait at times with pt amb with occ unsteadiness and frequent path deviations noted  -CC           User Key  (r) = Recorded By, (t) = Taken By, (c) = Cosigned By    Initials Name Provider Type     Cely South, ANDREW Physical Therapy Assistant               Obj/Interventions     Row Name 02/13/22 1715          Strength Comprehensive (MMT)    General Manual Muscle Testing (MMT) Assessment lower extremity strength deficits identified  -     Comment, General Manual Muscle Testing (MMT) Assessment Pt performed B LE ex in standing hip abd, marching, hip flex, heel raisies 10 reps with 1 UE support.  -CC           User Key  (r) = Recorded By, (t) = Taken By, (c) = Cosigned By    Initials Name Provider Type     Cely South, ANDREW Physical Therapy Assistant               Goals/Plan    No documentation.                Clinical Impression     Row Name 02/13/22 1719          Pain    Additional Documentation Pain Scale: Numbers Pre/Post-Treatment (Group)  -     Row Name 02/13/22 8659          Pain Scale: Numbers Pre/Post-Treatment    Pretreatment Pain Rating 0/10 - no pain  -     Posttreatment Pain Rating 0/10 - no pain  -     Row Name 02/13/22 1719          Plan of Care Review    Plan of Care Reviewed With patient  -     Progress improving  -     Outcome Summary Pt performed B LE ex in standing hip abd, marching, hip flex, heel raisies 10 reps with 1 UE support pt with 3 LOB during standing ex however able to right self. Pt ambs with slow mary and  LOB noted; R LE crossover gait at times with pt amb with occ unsteadiness and frequent path deviations noted.  Con't with PT POC and progress as tolerated  -     Row Name 02/13/22 1719          Positioning and Restraints    Pre-Treatment Position in bed  -CC     Post Treatment Position bed  -CC     In Bed fowlers; call light within reach; encouraged to call for assist; exit alarm on  -CC           User Key  (r) = Recorded By, (t) = Taken By, (c) = Cosigned By    Initials Name Provider Type    Cely Rm PTA Physical Therapy Assistant               Outcome Measures     Row Name 02/13/22 1723          How much help from another person do you currently need...    Turning from your back to your side while in flat bed without using bedrails? 4  -CC     Moving from lying on back to sitting on the side of a flat bed without bedrails? 4  -CC     Moving to and from a bed to a chair (including a wheelchair)? 3  -CC     Standing up from a chair using your arms (e.g., wheelchair, bedside chair)? 3  -CC     Climbing 3-5 steps with a railing? 2  -CC     To walk in hospital room? 3  -CC     AM-PAC 6 Clicks Score (PT) 19  -CC     Row Name 02/13/22 1723          Functional Assessment    Outcome Measure Options AM-PAC 6 Clicks Basic Mobility (PT)  -CC           User Key  (r) = Recorded By, (t) = Taken By, (c) = Cosigned By    Initials Name Provider Type    Cely Rm PTA Physical Therapy Assistant                             Physical Therapy Education                 Title: PT OT SLP Therapies (In Progress)     Topic: Physical Therapy (Done)     Point: Mobility training (Done)     Learning Progress Summary           Patient Acceptance, E,TB, VU by CC at 2/12/2022 1624    Comment: Perform amb and increase daily    Acceptance, E,TB, VU by  at 2/11/2022 1449    Comment: Purpose of PT/POC.                   Point: Home exercise program (Done)     Learning Progress Summary           Patient Acceptance, E,TB, VU  by  at 2/11/2022 1449    Comment: Purpose of PT/POC.                   Point: Precautions (Done)     Learning Progress Summary           Patient Acceptance, E,TB, VU by  at 2/13/2022 1724    Comment: maintain slower speed for amb until no unsteadiness or path deviations noted.    Acceptance, E,TB, VU by  at 2/11/2022 1449    Comment: Purpose of PT/POC.                               User Key     Initials Effective Dates Name Provider Type Discipline     06/16/21 -  Alyssa Thompson, PT Physical Therapist PT     06/16/21 -  Cely South PTA Physical Therapy Assistant PT              PT Recommendation and Plan     Plan of Care Reviewed With: patient  Progress: improving  Outcome Summary: Pt performed B LE ex in standing hip abd, marching, hip flex, heel raisies 10 reps with 1 UE support pt with 3 LOB during standing ex however able to right self. Pt ambs with slow mary and LOB noted; R LE crossover gait at times with pt amb with occ unsteadiness and frequent path deviations noted.  Con't with PT POC and progress as tolerated     Time Calculation:    PT Charges     Row Name 02/13/22 1726             Time Calculation    PT Received On 02/13/22  -CC      PT Goal Re-Cert Due Date 02/21/22  -CC              Time Calculation- PT    Total Timed Code Minutes- PT 46 minute(s)  -CC              Timed Charges    04776 - PT Therapeutic Exercise Minutes 16  -CC      50936 - Gait Training Minutes  20  -CC      35196 - PT Therapeutic Activity Minutes 10  -CC              Total Minutes    Timed Charges Total Minutes 46  -CC       Total Minutes 46  -CC            User Key  (r) = Recorded By, (t) = Taken By, (c) = Cosigned By    Initials Name Provider Type    CC Cely South PTA Physical Therapy Assistant              Therapy Charges for Today     Code Description Service Date Service Provider Modifiers Qty    30027457469 HC PT THER PROC EA 15 MIN 2/12/2022 Cely South PTA GP 1    35286888331 HC GAIT  TRAINING EA 15 MIN 2/12/2022 Cely South, PTA GP 1    40708548534 HC PT THERAPEUTIC ACT EA 15 MIN 2/12/2022 Cely South, PTA GP 1    81637648353 HC PT THER PROC EA 15 MIN 2/13/2022 Cely South, PTA GP 1    79754376083 HC GAIT TRAINING EA 15 MIN 2/13/2022 Cely South, PTA GP 1    88201332726 HC PT THERAPEUTIC ACT EA 15 MIN 2/13/2022 Cely South, PTA GP 1          PT G-Codes  Outcome Measure Options: AM-PAC 6 Clicks Basic Mobility (PT)  AM-PAC 6 Clicks Score (PT): 19  AM-PAC 6 Clicks Score (OT): 18    Cely South PTA  2/13/2022

## 2022-02-13 NOTE — PLAN OF CARE
Goal Outcome Evaluation:  Plan of Care Reviewed With: patient        Progress: improving  Outcome Summary: Pt performed B LE ex in standing hip abd, marching, hip flex, heel raisies 10 reps with 1 UE support pt with 3 LOB during standing ex however able to right self. Pt ambs with slow mary and LOB noted; R LE crossover gait at times with pt amb with occ unsteadiness and frequent path deviations noted.  Con't with PT POC and progress as tolerated

## 2022-02-13 NOTE — PROGRESS NOTES
[facility]    Name:  Deborah Jones   Age:  71 y.o.  Sex:  female  :  1950  MRN:  [id number]   Visit Number:  [id number]  Admission Date:  2022  Date Of Service:  22  Primary Care Physician:  Joseluis Keyes MD     LOS: 1 day :    Chief Complaint:      Follow-up CVA    Subjective:    Patient seen evaluated again today at bedside.  Appears to be in fair spirits.  Has been doing her speech therapy exercises.  Denies any new symptoms or changes.  Is looking into [facility] for rehab if possible.    Hospital Course:    Patient is a 71-year-old female with history of type 2 diabetes, presented from home with complaints of apparent right sided facial droop, lower extremity weakness that had started at 1430 on 2022.  Initial work-up in the emergency room was concerning for CVA, with CT head without contrast demonstrating a left basal ganglia lacunar infarct acute versus subacute.  CT cerebral perfusion unremarkable.  CT angio head neck unremarkable.  Labs were otherwise largely unremarkable.  She was given loading dose of Plavix and 325 mg of aspirin and teleneurologist consulted on presentation.  Patient was admitted for observation and work-up.    MRI of the brain today consistent with acute stroke in left basal ganglia region.  Seen by speech therapy and cleared for diet.  Teleneurology recommending 24-hour bedrest at this time.  Continue on aspirin, Plavix, Lipitor.  Diabetic/cardiac diet.  2D echo.    Echo findings with normal EF, negative bubble study, with grade 2 diastolic dysfunction.  Telemetry monitoring thus far with no evidence of atrial fibrillation or arrhythmia.  Neurology recommending dual antiplatelet therapy in addition to statin therapy.  Patient will also likely benefit from cardiac monitoring on outpatient basis for the next 30 days to rule out cardioembolic arrhythmia.  Looking at potential inpatient rehab pending PT and OT  evaluations.    Review of Systems:     All systems were reviewed and negative except as mentioned in subjective, assessment and plan.    Vital Signs:    Temp:  [96.9 °F (36.1 °C)-97.9 °F (36.6 °C)] 96.9 °F (36.1 °C)  Heart Rate:  [53-64] 64  Resp:  [16-18] 17  BP: (111-148)/(47-88) 123/81    Intake and output:    I/O last 3 completed shifts:  In: 960 [P.O.:960]  Out: -   I/O this shift:  In: 1080 [P.O.:1080]  Out: -     Physical Examination:    General Appearance:  Alert and cooperative.  No acute distress   Head:  Atraumatic and normocephalic.   Eyes: Conjunctivae and sclerae normal, no icterus. No pallor.   Throat: No oral lesions, no thrush, oral mucosa moist.   Neck: Supple, trachea midline, no thyromegaly.   Lungs:   Breath sounds heard bilaterally equally.  No wheezing or crackles. No Pleural rub or bronchial breathing.   Heart:  Normal S1 and S2, no murmur, no gallop, no rub. No JVD.   Abdomen:   Normal bowel sounds, no masses, no organomegaly. Soft, nontender, nondistended, no rebound tenderness.   Extremities: Supple, no edema, no cyanosis, no clubbing.   Skin: No bleeding or rash.   Neurologic: Alert and oriented x 3.  Facial droop present on the right.  Dysarthria     Laboratory results:    Results from last 7 days   Lab Units 02/11/22  0524 02/10/22  0545 02/09/22  1654   SODIUM mmol/L 140 140 138   POTASSIUM mmol/L 4.0 4.2 4.3   CHLORIDE mmol/L 110* 110* 101   CO2 mmol/L 19.6* 20.8* 23.1   BUN mg/dL 14 16 19   CREATININE mg/dL 1.14* 1.10* 1.21*   CALCIUM mg/dL 8.8 8.9 9.8   BILIRUBIN mg/dL  --   --  0.2   ALK PHOS U/L  --   --  111   ALT (SGPT) U/L  --   --  17   AST (SGOT) U/L  --   --  20   GLUCOSE mg/dL 140* 145* 130*     Results from last 7 days   Lab Units 02/10/22  0545 02/09/22  1654   WBC 10*3/mm3 5.59 6.26   HEMOGLOBIN g/dL 12.1 12.5   HEMATOCRIT % 37.9 37.6   PLATELETS 10*3/mm3 360 395     Results from last 7 days   Lab Units 02/09/22  1654   INR  0.84*     Results from last 7 days   Lab Units  02/09/22  1654   TROPONIN T ng/mL <0.010             I have reviewed the patient's laboratory results.    Radiology results:    No radiology results from the last 24 hrs  I have reviewed the patient's radiology reports.    Medication Review:     I have reviewed the patient's active and prn medications.     Problem List:      Cerebrovascular accident (CVA) ([facility])      Assessment:    1. Basal ganglia stroke, present on admission  2. Type 2 diabetes  3. Hyperlipidemia  4. Mild renal insufficiency    Plan:    Patient's vital signs remained stable.  Blood pressures are appropriate.  Restarted Metformin and glipizide.  Continue with aspirin, Plavix, statin therapy.  PT and OT evaluations.  Per case management, insurance requesting a peer to peer which is been scheduled for tomorrow.  Further recommendations will depend upon clinical course.    DVT Prophylaxis: Heparin  Code Status: Full  Diet: Carb consistent/cardiac  Discharge Plan: 1 to 2 days to rehab facility    Belle Haque DO  02/13/22  15:03 EST    Dictated utilizing Dragon dictation.

## 2022-02-13 NOTE — PLAN OF CARE
Goal Outcome Evaluation:  Plan of Care Reviewed With: patient   Vitals unremarkable.  Maintaining SAT on room air.  No reports of pain.  Drooping noted to r side of face.  Slightly disturbed gait on r side.  Oriented with clear speech and sensation intact.  Ambulation with standby assistance.  No acute events this shift.

## 2022-02-14 LAB
GLUCOSE BLDC GLUCOMTR-MCNC: 115 MG/DL (ref 70–130)
GLUCOSE BLDC GLUCOMTR-MCNC: 125 MG/DL (ref 70–130)
GLUCOSE BLDC GLUCOMTR-MCNC: 145 MG/DL (ref 70–130)
GLUCOSE BLDC GLUCOMTR-MCNC: 156 MG/DL (ref 70–130)

## 2022-02-14 PROCEDURE — 97112 NEUROMUSCULAR REEDUCATION: CPT

## 2022-02-14 PROCEDURE — 97110 THERAPEUTIC EXERCISES: CPT

## 2022-02-14 PROCEDURE — 82962 GLUCOSE BLOOD TEST: CPT

## 2022-02-14 PROCEDURE — 96372 THER/PROPH/DIAG INJ SC/IM: CPT

## 2022-02-14 PROCEDURE — G0378 HOSPITAL OBSERVATION PER HR: HCPCS

## 2022-02-14 PROCEDURE — 25010000002 HEPARIN (PORCINE) PER 1000 UNITS: Performed by: INTERNAL MEDICINE

## 2022-02-14 PROCEDURE — 99224 PR SBSQ OBSERVATION CARE/DAY 15 MINUTES: CPT | Performed by: FAMILY MEDICINE

## 2022-02-14 PROCEDURE — 97116 GAIT TRAINING THERAPY: CPT

## 2022-02-14 RX ADMIN — HEPARIN SODIUM 5000 UNITS: 5000 INJECTION, SOLUTION INTRAVENOUS; SUBCUTANEOUS at 21:03

## 2022-02-14 RX ADMIN — CLOPIDOGREL BISULFATE 75 MG: 75 TABLET ORAL at 08:30

## 2022-02-14 RX ADMIN — HEPARIN SODIUM 5000 UNITS: 5000 INJECTION, SOLUTION INTRAVENOUS; SUBCUTANEOUS at 08:31

## 2022-02-14 RX ADMIN — ATORVASTATIN CALCIUM 80 MG: 80 TABLET, FILM COATED ORAL at 21:03

## 2022-02-14 RX ADMIN — METFORMIN HYDROCHLORIDE 500 MG: 500 TABLET, FILM COATED ORAL at 08:29

## 2022-02-14 RX ADMIN — ASPIRIN 81 MG CHEWABLE TABLET 81 MG: 81 TABLET CHEWABLE at 08:29

## 2022-02-14 RX ADMIN — GLIPIZIDE 5 MG: 5 TABLET ORAL at 06:19

## 2022-02-14 NOTE — THERAPY TREATMENT NOTE
Patient Name: Deborah Jones  : 1950    MRN: [id number]                              Today's Date: 2022       Admit Date: 2022    Visit Dx:     ICD-10-CM ICD-9-CM   1. Cerebrovascular accident (CVA), unspecified mechanism (HCC)  I63.9 434.91     Patient Active Problem List   Diagnosis   • Cerebrovascular accident (CVA) (HCC)     Past Medical History:   Diagnosis Date   • Impaired functional mobility, balance, gait, and endurance      No past surgical history on file.   General Information     Row Name 22 1224          OT Time and Intention    Document Type therapy note (daily note)  -SD     Mode of Treatment occupational therapy  -SD     Row Name 22 1224          General Information    Patient Profile Reviewed yes  -SD           User Key  (r) = Recorded By, (t) = Taken By, (c) = Cosigned By    Initials Name Provider Type    SD Alia Santoyo OT Occupational Therapist                 Mobility/ADL's     Row Name 22 1224          Bed Mobility    Bed Mobility supine-sit  -SD     Supine-Sit [place] (Bed Mobility) modified independence  -SD     Assistive Device (Bed Mobility) bed rails; head of bed elevated  -SD     Row Name 22 1224          Transfers    Transfers sit-stand transfer  -SD     Sit-Stand [place] (Transfers) standby assist  -SD     Row Name 22 1224          Functional Mobility    Functional Mobility- Ind. Level standby assist; contact guard assist  -SD     Functional Mobility-Distance (Feet) 12  -SD     Functional Mobility- Comment around bed  -SD           User Key  (r) = Recorded By, (t) = Taken By, (c) = Cosigned By    Initials Name Provider Type    Alia Salas OT Occupational Therapist               Obj/Interventions     Row Name 22 1228          Shoulder (Therapeutic Exercise)    Shoulder (Therapeutic Exercise) strengthening exercise  -SD     Shoulder Strengthening (Therapeutic Exercise) bilateral; flexion; extension; aBduction;  aDduction; horizontal aBduction/aDduction; yellow; 10 repetitions  -SD     Row Name 02/14/22 1228          Elbow/Forearm (Therapeutic Exercise)    Elbow/Forearm (Therapeutic Exercise) strengthening exercise  -SD     Elbow/Forearm Strengthening (Therapeutic Exercise) bilateral; flexion; extension; supination; pronation; resistance band; 2 lb free weight; 10 repetitions; yellow  -SD     Row Name 02/14/22 1228          Wrist (Therapeutic Exercise)    Wrist (Therapeutic Exercise) strengthening exercise  -SD     Wrist Strengthening (Therapeutic Exercise) bilateral; flexion; extension; 2 lb free weight; 10 repetitions  -SD     Row Name 02/14/22 1228          Hand (Therapeutic Exercise)    Hand (Therapeutic Exercise) strengthening exercise  -SD     Hand Strengthening (Therapeutic Exercise) bilateral; finger flexion; finger extension; intrinsic strengthening; extrinsic strengthening;  strengthening; squeeze ball/egg; 10 repetitions; thumb opposition  -SD     Row Name 02/14/22 1228          Therapeutic Exercise    Therapeutic Exercise shoulder; elbow/forearm; wrist; hand; other (see comments)  2# dowel soraya, resistance ball for  strength  -SD           User Key  (r) = Recorded By, (t) = Taken By, (c) = Cosigned By    Initials Name Provider Type    SD Alia Santoyo OT Occupational Therapist               Goals/Plan    No documentation.                Clinical Impression     Row Name 02/14/22 1232          Pain Scale: Numbers Pre/Post-Treatment    Pretreatment Pain Rating 0/10 - no pain  -SD     Posttreatment Pain Rating 0/10 - no pain  -SD     Row Name 02/14/22 1232          Plan of Care Review    Plan of Care Reviewed With patient  -SD     Progress improving  -SD     Outcome Summary OT tx completed. Patient completed bed mobility with mod ind, transfer and mobility around bed with SBA-CGA, completed BUE ther ex using yellow theraband and 2# dowel soraya, intrinsic/extrinsic hand exercises. Continue OT POC  -SD      Row Name 02/14/22 1232          Positioning and Restraints    Pre-Treatment Position in bed  -SD     Post Treatment Position chair  -SD     In Chair sitting; call light within reach; encouraged to call for assist  -SD           User Key  (r) = Recorded By, (t) = Taken By, (c) = Cosigned By    Initials Name Provider Type    Alia Salas OT Occupational Therapist               Outcome Measures     Row Name 02/14/22 1234          How much help from another is currently needed...    Putting on and taking off regular lower body clothing? 3  -SD     Bathing (including washing, rinsing, and drying) 3  -SD     Toileting (which includes using toilet bed pan or urinal) 3  -SD     Putting on and taking off regular upper body clothing 3  -SD     Taking care of personal grooming (such as brushing teeth) 3  -SD     Eating meals 3  -SD     AM-PAC 6 Clicks Score (OT) 18  -SD     Row Name 02/14/22 1234          Functional Assessment    Outcome Measure Options AM-PAC 6 Clicks Daily Activity (OT)  -SD           User Key  (r) = Recorded By, (t) = Taken By, (c) = Cosigned By    Initials Name Provider Type    Alia Salas OT Occupational Therapist                Occupational Therapy Education                 Title: PT OT SLP Therapies (Done)     Topic: Occupational Therapy (Done)     Point: ADL training (Done)     Description:   Instruct learner(s) on proper safety adaptation and remediation techniques during self care or transfers.   Instruct in proper use of assistive devices.              Learning Progress Summary           Patient Acceptance, E,TB, VU by MD at 2/14/2022 1219    Acceptance, E,TB, VU by SD at 2/11/2022 1448    Comment: Benefit of OT; OT POC                   Point: Home exercise program (Done)     Description:   Instruct learner(s) on appropriate technique for monitoring, assisting and/or progressing therapeutic exercises/activities.              Learning Progress Summary           Patient Acceptance,  E,TB, VU by SD at 2/14/2022 1234    Comment: Benefit of UB ther ex    Acceptance, E,TB, VU by MD at 2/14/2022 1219                   Point: Precautions (Done)     Description:   Instruct learner(s) on prescribed precautions during self-care and functional transfers.              Learning Progress Summary           Patient Acceptance, E,TB, VU by MD at 2/14/2022 1219                   Point: Body mechanics (Done)     Description:   Instruct learner(s) on proper positioning and spine alignment during self-care, functional mobility activities and/or exercises.              Learning Progress Summary           Patient Acceptance, E,TB, VU by MD at 2/14/2022 1219                               User Key     Initials Effective Dates Name Provider Type Discipline    SD 06/16/21 -  Alia Santoyo OT Occupational Therapist OT    MD 06/16/21 -  Joseluis Dietz RN Registered Nurse Nurse              OT Recommendation and Plan  Planned Therapy Interventions (OT): activity tolerance training, adaptive equipment training, BADL retraining, patient/caregiver education/training, ROM/therapeutic exercise, strengthening exercise, transfer/mobility retraining  Therapy Frequency (OT): daily (Mon-Fri)  Plan of Care Review  Plan of Care Reviewed With: patient  Progress: improving  Outcome Summary: OT tx completed. Patient completed bed mobility with mod ind, transfer and mobility around bed with SBA-CGA, completed BUE ther ex using yellow theraband and 2# dowel soraya, intrinsic/extrinsic hand exercises. Continue OT POC     Time Calculation:    Time Calculation- OT     Row Name 02/14/22 1235             Time Calculation- OT    OT Start Time 1118  -SD      OT Stop Time 1135  -SD      OT Time Calculation (min) 17 min  -SD      OT Received On 02/14/22  -SD      OT Goal Re-Cert Due Date 02/23/22  -SD              Timed Charges    56017 - OT Therapeutic Exercise Minutes 12  -SD      47254 - OT Therapeutic Activity Minutes 5  -SD               Total Minutes    Timed Charges Total Minutes 17  -SD       Total Minutes 17  -SD            User Key  (r) = Recorded By, (t) = Taken By, (c) = Cosigned By    Initials Name Provider Type    Alia Salas OT Occupational Therapist              Therapy Charges for Today     Code Description Service Date Service Provider Modifiers Qty    86413250189  OT THER PROC EA 15 MIN 2/14/2022 Alia Santoyo OT GO 1               Alia Santoyo OT  2/14/2022

## 2022-02-14 NOTE — THERAPY TREATMENT NOTE
Patient Name: Deborah Jones  : 1950    MRN: [id number]                              Today's Date: 2022       Admit Date: 2022    Visit Dx:     ICD-10-CM ICD-9-CM   1. Cerebrovascular accident (CVA), unspecified mechanism (HCC)  I63.9 434.91     Patient Active Problem List   Diagnosis   • Cerebrovascular accident (CVA) (HCC)     Past Medical History:   Diagnosis Date   • Impaired functional mobility, balance, gait, and endurance      No past surgical history on file.   General Information     Row Name 22 1522          Physical Therapy Time and Intention    Document Type therapy note (daily note)  -RM     Mode of Treatment physical therapy  -RM     Row Name 22 152          General Information    Patient Profile Reviewed yes  -RM     Existing Precautions/Restrictions fall  -RM     Row Name 22 152          Cognition    Orientation Status (Cognition) oriented x 4  -RM     Row Name 22 152          Safety Issues, Functional Mobility    Safety Issues Affecting Function (Mobility) insight into deficits/self-awareness  -RM     Impairments Affecting Function (Mobility) coordination; endurance/activity tolerance; strength  -RM           User Key  (r) = Recorded By, (t) = Taken By, (c) = Cosigned By    Initials Name Provider Type    RM Nhan Orr, PTA Physical Therapy Assistant               Mobility     Row Name 22 1523          Bed Mobility    Supine-Sit [place] (Bed Mobility) modified independence  -RM     Assistive Device (Bed Mobility) bed rails; head of bed elevated  -RM     Row Name 22 1523          Sit-Stand Transfer    Sit-Stand [place] (Transfers) standby assist  -RM     Assistive Device (Sit-Stand Transfers) other (see comments)  gaitbelt  -RM     Row Name 22 1523          Gait/Stairs (Locomotion)    [place] Level (Gait) contact guard; standby assist; verbal cues  HHA  -RM     Assistive Device (Gait) other (see comments)  gaitbelt  -RM      Distance in Feet (Gait) 100'  -RM     Deviations/Abnormal Patterns (Gait) base of support, narrow; mary decreased; gait speed decreased; stride length decreased; weight shifting decreased  -RM     Bilateral Gait Deviations heel strike decreased  -RM           User Key  (r) = Recorded By, (t) = Taken By, (c) = Cosigned By    Initials Name Provider Type    Nhan Amador, ANDREW Physical Therapy Assistant               Obj/Interventions     Row Name 02/14/22 1524          Strength Comprehensive (MMT)    Comment, General Manual Muscle Testing (MMT) Assessment Tandem gait 20', heel walk 10' toe walk 10', standing hip abd/add x10, heel raise x10, sit to stand x 10 all with HHA and cga/min a for balance  -RM           User Key  (r) = Recorded By, (t) = Taken By, (c) = Cosigned By    Initials Name Provider Type    Nhan Amador, ANDREW Physical Therapy Assistant               Goals/Plan    No documentation.                Clinical Impression     Row Name 02/14/22 1526          Pain Scale: Numbers Pre/Post-Treatment    Pretreatment Pain Rating 0/10 - no pain  -RM     Posttreatment Pain Rating 0/10 - no pain  -RM     Row Name 02/14/22 1526          Plan of Care Review    Plan of Care Reviewed With patient  -RM     Progress improving  -RM     Outcome Summary Pt recieved sitting uib and willing to participate with therapy. Pt performed tandem gait, toe walk and heel walk all with cga/min a as well as standing TE.  Pt also was able to ambulate 100' cga with HHA and gaitbelt. PT did not have cross over of R LE during gait this treatment but did have x2 episodes of LOB that pt was able to assist in correction.  Pt did have difficulty with maintaining balance with more challenging gait activities mentioned earlier.  See flowsheet for details.  -RM     Row Name 02/14/22 1526          Positioning and Restraints    Pre-Treatment Position in bed  -RM     Post Treatment Position bed  -RM     In Bed sitting EOB; call light  within reach; encouraged to call for assist; notified nsg  -RM           User Key  (r) = Recorded By, (t) = Taken By, (c) = Cosigned By    Initials Name Provider Type    Nhan Amador, ANDREW Physical Therapy Assistant               Outcome Measures     Row Name 02/14/22 1531          How much help from another person do you currently need...    Turning from your back to your side while in flat bed without using bedrails? 4  -RM     Moving from lying on back to sitting on the side of a flat bed without bedrails? 4  -RM     Moving to and from a bed to a chair (including a wheelchair)? 3  -RM     Standing up from a chair using your arms (e.g., wheelchair, bedside chair)? 4  -RM     Climbing 3-5 steps with a railing? 2  -RM     To walk in hospital room? 3  -RM     AM-PAC 6 Clicks Score (PT) 20  -RM     Row Name 02/14/22 1234          Functional Assessment    Outcome Measure Options AM-PAC 6 Clicks Daily Activity (OT)  -SD           User Key  (r) = Recorded By, (t) = Taken By, (c) = Cosigned By    Initials Name Provider Type    Nhan Amador, ANDREW Physical Therapy Assistant    Alia Salas OT Occupational Therapist                             Physical Therapy Education                 Title: PT OT SLP Therapies (Done)     Topic: Physical Therapy (Done)     Point: Mobility training (Done)     Learning Progress Summary           Patient Acceptance, E,TB,D, VU,NR by  at 2/14/2022 1532    Comment: pacing with gait for safety.    Acceptance, E,TB, VU by MD at 2/14/2022 1219    Acceptance, E,TB, VU by CC at 2/12/2022 1624    Comment: Perform amb and increase daily    Acceptance, E,TB, VU by LM at 2/11/2022 1449    Comment: Purpose of PT/POC.                   Point: Home exercise program (Done)     Learning Progress Summary           Patient Acceptance, E,TB, VU by MD at 2/14/2022 1219    Acceptance, E,TB, VU by LM at 2/11/2022 1449    Comment: Purpose of PT/POC.                   Point: Precautions (Done)      Learning Progress Summary           Patient Acceptance, E,TB, VU by MD at 2/14/2022 1219    Acceptance, E,TB, VU by CC at 2/13/2022 1724    Comment: maintain slower speed for amb until no unsteadiness or path deviations noted.    Acceptance, E,TB, VU by  at 2/11/2022 1449    Comment: Purpose of PT/POC.                               User Key     Initials Effective Dates Name Provider Type Discipline     06/16/21 -  Alyssa Thompson, PT Physical Therapist PT    CC 06/16/21 -  Cely South PTA Physical Therapy Assistant PT     06/16/21 -  Nhan Orr PTA Physical Therapy Assistant PT    MD 06/16/21 -  Joseluis Dietz RN Registered Nurse Nurse              PT Recommendation and Plan     Plan of Care Reviewed With: patient  Progress: improving  Outcome Summary: Pt recieved sitting uib and willing to participate with therapy. Pt performed tandem gait, toe walk and heel walk all with cga/min a as well as standing TE.  Pt also was able to ambulate 100' cga with HHA and gaitbelt. PT did not have cross over of R LE during gait this treatment but did have x2 episodes of LOB that pt was able to assist in correction.  Pt did have difficulty with maintaining balance with more challenging gait activities mentioned earlier.  See flowsheet for details.     Time Calculation:    PT Charges     Row Name 02/14/22 1532             Time Calculation    Start Time 1416  -RM      Stop Time 1454  -RM      Time Calculation (min) 38 min  -RM      PT Received On 02/14/22  -RM      PT Goal Re-Cert Due Date 02/21/22  -RM              Time Calculation- PT    Total Timed Code Minutes- PT 38 minute(s)  -RM              Timed Charges    22647 - PT Therapeutic Exercise Minutes 10  -RM      43201 -  PT Neuromuscular Reeducation Minutes 18  -RM      08743 - Gait Training Minutes  10  -RM              Total Minutes    Timed Charges Total Minutes 38  -RM       Total Minutes 38  -RM            User Key  (r) = Recorded By, (t) =  Taken By, (c) = Cosigned By    Initials Name Provider Type    Nhan Amador, PTA Physical Therapy Assistant              Therapy Charges for Today     Code Description Service Date Service Provider Modifiers Qty    58483540318 HC PT NEUROMUSC RE EDUCATION EA 15 MIN 2/14/2022 Nhan Orr, PTA GP 1    93092948500 HC PT THER PROC EA 15 MIN 2/14/2022 Nhan Orr, PTA GP 1    [id number] HC GAIT TRAINING EA 15 MIN 2/14/2022 Nhan Orr, PTA GP 1          PT G-Codes  Outcome Measure Options: AM-PAC 6 Clicks Daily Activity (OT)  AM-PAC 6 Clicks Score (PT): 20  AM-PAC 6 Clicks Score (OT): 18    Nhan Orr PTA  2/14/2022

## 2022-02-14 NOTE — CASE MANAGEMENT/SOCIAL WORK
Case Management Discharge Note           Provided Post Acute Provider List?: N/A  Provided Post Acute Provider Quality & Resource List?: N/A  Delivered To: Patient, Support Person  Method of Delivery: In person    Selected Continued Care - Admitted Since 2/9/2022     Destination    No services have been selected for the patient.              Durable Medical Equipment    No services have been selected for the patient.              Dialysis/Infusion    No services have been selected for the patient.              Home Medical Care    No services have been selected for the patient.              Therapy    No services have been selected for the patient.              Community Resources    No services have been selected for the patient.              Community & DME    No services have been selected for the patient.

## 2022-02-14 NOTE — PLAN OF CARE
Goal Outcome Evaluation:  Plan of Care Reviewed With: patient        Progress: improving  Outcome Summary: Pt recieved sitting uib and willing to participate with therapy. Pt performed tandem gait, toe walk and heel walk all with cga/min a as well as standing TE.  Pt also was able to ambulate 100' cga with HHA and gaitbelt. PT did not have cross over of R LE during gait this treatment but did have x2 episodes of LOB that pt was able to assist in correction.  Pt did have difficulty with maintaining balance with more challenging gait activities mentioned earlier.  See flowsheet for details.

## 2022-02-14 NOTE — CASE MANAGEMENT/SOCIAL WORK
Continued Stay Note  [facility]     Patient Name: Deborah Jones  MRN: [id number]  Today's Date: 2/14/2022    Admit Date: 2/9/2022     Discharge Plan     Row Name 02/14/22 1058       Plan    Plan Denial for acute care upheld despite a Peer to Peer .[facility]  they have spoke to pt daughter Nisa I spoke to pt she is in agreement to this they will start the pre cert               Discharge Codes    No documentation.               Expected Discharge Date and Time     Expected Discharge Date Expected Discharge Time    Feb 15, 2022             Bianca Rod RN

## 2022-02-14 NOTE — PLAN OF CARE
Goal Outcome Evaluation:  Plan of Care Reviewed With: patient        Progress: improving  Outcome Summary: OT tx completed. Patient completed bed mobility with mod ind, transfer and mobility around bed with SBA-CGA, completed BUE ther ex using yellow theraband and 2# dowel soraya, intrinsic/extrinsic hand exercises. Continue OT POC

## 2022-02-14 NOTE — PROGRESS NOTES
[facility]    Name:  Deborah Jones   Age:  71 y.o.  Sex:  female  :  1950  MRN:  [id number]   Visit Number:  [id number]  Admission Date:  2022  Date Of Service:  22  Primary Care Physician:  Joseluis Keyes MD     LOS: 1 day :    Chief Complaint:      Follow-up CVA    Subjective:    Patient seen at bedside. No acute changes overnight. Is working well with therapies. Trying her speech exercises. I discussed she was denied physical therapy rehab, however well approved for skilled nursing rehab. She understands.    Hospital Course:    Patient is a 71-year-old female with history of type 2 diabetes, presented from home with complaints of apparent right sided facial droop, lower extremity weakness that had started at 1430 on 2022.  Initial work-up in the emergency room was concerning for CVA, with CT head without contrast demonstrating a left basal ganglia lacunar infarct acute versus subacute.  CT cerebral perfusion unremarkable.  CT angio head neck unremarkable.  Labs were otherwise largely unremarkable.  She was given loading dose of Plavix and 325 mg of aspirin and teleneurologist consulted on presentation.  Patient was admitted for observation and work-up.    MRI of the brain today consistent with acute stroke in left basal ganglia region.  Seen by speech therapy and cleared for diet.  Teleneurology recommending 24-hour bedrest at this time.  Continue on aspirin, Plavix, Lipitor.  Diabetic/cardiac diet.  2D echo.    Echo findings with normal EF, negative bubble study, with grade 2 diastolic dysfunction.  Telemetry monitoring thus far with no evidence of atrial fibrillation or arrhythmia.  Neurology recommending dual antiplatelet therapy in addition to statin therapy.  Patient will also likely benefit from cardiac monitoring on outpatient basis for the next 30 days to rule out cardioembolic arrhythmia.  Looking at potential inpatient rehab pending  PT and OT evaluations.    Review of Systems:     All systems were reviewed and negative except as mentioned in subjective, assessment and plan.    Vital Signs:    Temp:  [97.3 °F (36.3 °C)-97.9 °F (36.6 °C)] 97.3 °F (36.3 °C)  Heart Rate:  [69-90] 73  Resp:  [16-22] 22  BP: (111-147)/(58-95) 147/83    Intake and output:    I/O last 3 completed shifts:  In: 1440 [P.O.:1440]  Out: -   I/O this shift:  In: 480 [P.O.:480]  Out: -     Physical Examination:    General Appearance:  Alert and cooperative.  No acute distress   Head:  Atraumatic and normocephalic.   Eyes: Conjunctivae and sclerae normal, no icterus. No pallor.   Throat: No oral lesions, no thrush, oral mucosa moist.   Neck: Supple, trachea midline, no thyromegaly.   Lungs:   Breath sounds heard bilaterally equally.  No wheezing or crackles. No Pleural rub or bronchial breathing.   Heart:  Normal S1 and S2, no murmur, no gallop, no rub. No JVD.   Abdomen:   Normal bowel sounds, no masses, no organomegaly. Soft, nontender, nondistended, no rebound tenderness.   Extremities: Supple, no edema, no cyanosis, no clubbing.   Skin: No bleeding or rash.   Neurologic: Alert and oriented x 3.  Facial droop present on the right.  Dysarthria, unchanged     Laboratory results:    Results from last 7 days   Lab Units 02/11/22  0524 02/10/22  0545 02/09/22  1654   SODIUM mmol/L 140 140 138   POTASSIUM mmol/L 4.0 4.2 4.3   CHLORIDE mmol/L 110* 110* 101   CO2 mmol/L 19.6* 20.8* 23.1   BUN mg/dL 14 16 19   CREATININE mg/dL 1.14* 1.10* 1.21*   CALCIUM mg/dL 8.8 8.9 9.8   BILIRUBIN mg/dL  --   --  0.2   ALK PHOS U/L  --   --  111   ALT (SGPT) U/L  --   --  17   AST (SGOT) U/L  --   --  20   GLUCOSE mg/dL 140* 145* 130*     Results from last 7 days   Lab Units 02/10/22  0545 02/09/22  1654   WBC 10*3/mm3 5.59 6.26   HEMOGLOBIN g/dL 12.1 12.5   HEMATOCRIT % 37.9 37.6   PLATELETS 10*3/mm3 360 395     Results from last 7 days   Lab Units 02/09/22  1654   INR  0.84*     Results from  last 7 days   Lab Units 02/09/22  1654   TROPONIN T ng/mL <0.010             I have reviewed the patient's laboratory results.    Radiology results:    No radiology results from the last 24 hrs  I have reviewed the patient's radiology reports.    Medication Review:     I have reviewed the patient's active and prn medications.     Problem List:      Cerebrovascular accident (CVA) ([facility])      Assessment:    1. Basal ganglia stroke, present on admission  2. Type 2 diabetes  3. Hyperlipidemia  4. Mild renal insufficiency    Plan:    Patient's vital signs and labs overall been stable. She was restarted on her glipizide. She is on aspirin, Plavix, statin therapy after discussion with neurology. She is underwent PT and OT evaluations. I did have a peer to peer with the insurance today, she was refused for physical therapy rehab, however approved for skilled. Pre-CERT has been started. She is otherwise stable for discharge to rehab facility once arranged    DVT Prophylaxis: Heparin  Code Status: Full  Diet: Carb consistent/cardiac  Discharge Plan: 1 to 2 days to rehab facility    Belle Haque DO  02/14/22  15:10 EST    Dictated utilizing Dragon dictation.

## 2022-02-15 VITALS
DIASTOLIC BLOOD PRESSURE: 75 MMHG | SYSTOLIC BLOOD PRESSURE: 130 MMHG | HEIGHT: 60 IN | BODY MASS INDEX: 24.58 KG/M2 | RESPIRATION RATE: 16 BRPM | OXYGEN SATURATION: 97 % | WEIGHT: 125.22 LBS | TEMPERATURE: 97.8 F | HEART RATE: 80 BPM

## 2022-02-15 LAB
GLUCOSE BLDC GLUCOMTR-MCNC: 153 MG/DL (ref 70–130)
GLUCOSE BLDC GLUCOMTR-MCNC: 77 MG/DL (ref 70–130)

## 2022-02-15 PROCEDURE — 25010000002 HEPARIN (PORCINE) PER 1000 UNITS: Performed by: INTERNAL MEDICINE

## 2022-02-15 PROCEDURE — 99217 PR OBSERVATION CARE DISCHARGE MANAGEMENT: CPT | Performed by: STUDENT IN AN ORGANIZED HEALTH CARE EDUCATION/TRAINING PROGRAM

## 2022-02-15 PROCEDURE — 82962 GLUCOSE BLOOD TEST: CPT

## 2022-02-15 PROCEDURE — 96372 THER/PROPH/DIAG INJ SC/IM: CPT

## 2022-02-15 PROCEDURE — G0378 HOSPITAL OBSERVATION PER HR: HCPCS

## 2022-02-15 RX ORDER — ATORVASTATIN CALCIUM 80 MG/1
80 TABLET, FILM COATED ORAL NIGHTLY
Qty: 30 TABLET | Refills: 0 | Status: SHIPPED | OUTPATIENT
Start: 2022-02-15

## 2022-02-15 RX ORDER — CLOPIDOGREL BISULFATE 75 MG/1
75 TABLET ORAL DAILY
Qty: 30 TABLET | Refills: 0 | Status: SHIPPED | OUTPATIENT
Start: 2022-02-16 | End: 2022-06-23

## 2022-02-15 RX ADMIN — CLOPIDOGREL BISULFATE 75 MG: 75 TABLET ORAL at 08:27

## 2022-02-15 RX ADMIN — GLIPIZIDE 5 MG: 5 TABLET ORAL at 06:37

## 2022-02-15 RX ADMIN — ASPIRIN 81 MG CHEWABLE TABLET 81 MG: 81 TABLET CHEWABLE at 08:28

## 2022-02-15 RX ADMIN — HEPARIN SODIUM 5000 UNITS: 5000 INJECTION, SOLUTION INTRAVENOUS; SUBCUTANEOUS at 08:29

## 2022-02-15 RX ADMIN — METFORMIN HYDROCHLORIDE 500 MG: 500 TABLET, FILM COATED ORAL at 08:27

## 2022-02-15 NOTE — CASE MANAGEMENT/SOCIAL WORK
Discharge Planning Assessment  [facility]     Patient Name: Deborah Jones  MRN: [id number]  Today's Date: 2/15/2022    Admit Date: 2/9/2022     Discharge Needs Assessment    No documentation.                Discharge Plan     Row Name 02/15/22 1010       Plan    Plan Comments Spoke shahnaz Bernard at [facility] rehab  unit   states  that they will accept patient today    report  [id number],  fax discharge  [phone]              Continued Care and Services - Admitted Since 2/9/2022     Destination     Service Provider Request Status Selected Services Address Phone Fax Patient Preferred    [facility]  Accepted N/A [address] [id number] [phone] --       Internal Comment last updated by Evelyn Guido RN 2/11/2022 1113    Accepted clinically- must have PT/OT notes before they can submit to insurance for pre-cert. 2/11/22 1100- G. Lata NORIEGA                         Expected Discharge Date and Time     Expected Discharge Date Expected Discharge Time    Feb 15, 2022          Demographic Summary    No documentation.                Functional Status    No documentation.                Psychosocial    No documentation.                Abuse/Neglect    No documentation.                Legal    No documentation.                Substance Abuse    No documentation.                Patient Forms    No documentation.                   Bianca Wooten, RN

## 2022-02-15 NOTE — CASE MANAGEMENT/SOCIAL WORK
Case Management Discharge Note           Provided Post Acute Provider List?: N/A  Provided Post Acute Provider Quality & Resource List?: N/A  Delivered To: Patient, Support Person  Method of Delivery: In person    Selected Continued Care - Discharged on 2/15/2022 Admission date: 2/9/2022 - Discharge disposition: Rehab Facility or Unit (DC - External)    Destination    No services have been selected for the patient.              Durable Medical Equipment    No services have been selected for the patient.              Dialysis/Infusion    No services have been selected for the patient.              Home Medical Care    No services have been selected for the patient.              Therapy    No services have been selected for the patient.              Community Resources    No services have been selected for the patient.              Community & DME    No services have been selected for the patient.                  Transportation Services  Ambulance: [facility]    Final Discharge Disposition Code: 03 - skilled nursing facility (SNF)

## 2022-02-15 NOTE — PLAN OF CARE
Goal Outcome Evaluation:              Outcome Summary: VSS.  Oxygenation maintained on room air.  No complaints during shift.  Continued cardiac monitoring and accu-checks as ordered. NIHSS Assesment as ordered.  Patient alert and oriented x4.  No acute events noted during shift.  Will continue to monitor patient.

## 2022-02-15 NOTE — DISCHARGE SUMMARY
[facility]   DISCHARGE SUMMARY      Name:  Deborah Jones   Age:  71 y.o.  Sex:  female  :  1950  MRN:  [id number]   Visit Number:  [id number]    Admission Date:  2022  Date of Discharge:  2/15/2022  Primary Care Physician:  Joseluis Keyes MD    Important issues to note:    - Continue Asprin 81mg and Plavix for 3 months, then discontinue Plavix and increase Aspiring to 325mg daily per neurology  - No evidence of arrhythmia while in hospital. May benefit from outpatient Zio Patch to further evaluate arrhythmia  - Follow up PCP in 1 week    Discharge Diagnoses:     1. Basal ganglia stroke, present on admission  2. Type 2 diabetes  3. Hyperlipidemia  4. Mild renal insufficiency      Problem List:     Active Hospital Problems    Diagnosis  POA   • Cerebrovascular accident (CVA) (HCC) [I63.9]  Yes      Resolved Hospital Problems   No resolved problems to display.     Presenting Problem:    Chief Complaint   Patient presents with   • Facial Droop      Consults:     Consulting Physician(s)             None          Procedures Performed:        History of presenting illness/Hospital Course:    Patient is a 71-year-old female with history of type 2 diabetes, presented from home with complaints of apparent right sided facial droop, lower extremity weakness that had started at 1430 on 2022.  Initial work-up in the emergency room was concerning for CVA, with CT head without contrast demonstrating a left basal ganglia lacunar infarct acute versus subacute.  CT cerebral perfusion unremarkable.  CT angio head neck unremarkable.  Labs were otherwise largely unremarkable.  She was given loading dose of Plavix and 325 mg of aspirin and teleneurologist consulted on presentation.  Patient was admitted for observation and work-up.     MRI of the brain today consistent with acute stroke in left basal ganglia region.  Seen by speech therapy and cleared for diet.  Teleneurology recommending  24-hour bedrest at this time.  Continue on aspirin, Plavix, Lipitor.  Diabetic/cardiac diet.  2D echo.     Echo findings with normal EF, negative bubble study, with grade 2 diastolic dysfunction.  Telemetry monitoring thus far with no evidence of atrial fibrillation or arrhythmia.  Neurology recommending dual antiplatelet therapy in addition to statin therapy.  Patient will also likely benefit from cardiac monitoring on outpatient basis for the next 30 days to rule out cardioembolic arrhythmia.  Looking at potential inpatient rehab pending PT and OT evaluations.    Neurology has recommended the patient to be on dual antiplatelet therapy with Aspirin 81mg and Plavix for 3 months, then discontinue Plavix and increase the Aspirin to 325mg thereafter. She has had no evidence of arrhythmia, particularly atrial fibrillation during hospital stay, but may benefit from outpatient Zio patch to further evaluate. If afib is noted, then she would benefit from anticoagulation.    Patient medically stable at time of discharge to be discharged to [facility]. She will need to see her PCP in 1 week.    Vital Signs:    Temp:  [97.3 °F (36.3 °C)-98.1 °F (36.7 °C)] 97.8 °F (36.6 °C)  Heart Rate:  [67-95] 80  Resp:  [16-22] 16  BP: (110-147)/(67-83) 130/75    Physical Exam:    General Appearance:  Alert and cooperative. Sitting up on edge of bed, in no acute distress.   Head:  Atraumatic and normocephalic.   Eyes: Conjunctivae and sclerae normal, no icterus. No pallor.       Throat: No oral lesions, no thrush, oral mucosa moist.   Neck: Supple, trachea midline, no thyromegaly.       Lungs:   Breath sounds heard bilaterally equally.  No crackles or wheezing. No Pleural rub or bronchial breathing.   Heart:  Normal S1 and S2, no murmur, no gallop, no rub. No JVD.   Abdomen:   Normal bowel sounds, no masses, no organomegaly. Soft, nontender, nondistended, no rebound tenderness.   Extremities: Supple, no edema, no cyanosis, no  clubbing.   Pulses: Pulses palpable bilaterally.   Skin: No bleeding or rash.   Neurologic: Alert and oriented x 3. Slight facial droop on the right present.  Moves all four limbs. No tremors.     Pertinent Lab Results:     Results from last 7 days   Lab Units 02/11/22  0524 02/10/22  0545 02/09/22  1654   SODIUM mmol/L 140 140 138   POTASSIUM mmol/L 4.0 4.2 4.3   CHLORIDE mmol/L 110* 110* 101   CO2 mmol/L 19.6* 20.8* 23.1   BUN mg/dL 14 16 19   CREATININE mg/dL 1.14* 1.10* 1.21*   CALCIUM mg/dL 8.8 8.9 9.8   BILIRUBIN mg/dL  --   --  0.2   ALK PHOS U/L  --   --  111   ALT (SGPT) U/L  --   --  17   AST (SGOT) U/L  --   --  20   GLUCOSE mg/dL 140* 145* 130*     Results from last 7 days   Lab Units 02/10/22  0545 02/09/22  1654   WBC 10*3/mm3 5.59 6.26   HEMOGLOBIN g/dL 12.1 12.5   HEMATOCRIT % 37.9 37.6   PLATELETS 10*3/mm3 360 395     Results from last 7 days   Lab Units 02/09/22  1654   INR  0.84*     Results from last 7 days   Lab Units 02/09/22  1654   TROPONIN T ng/mL <0.010     Results from last 7 days   Lab Units 02/09/22  1654   PROBNP pg/mL 20.5                       Pertinent Radiology Results:    Imaging Results (All)     Procedure Component Value Units Date/Time    MRI Brain Without Contrast [[id number]] Collected: 02/10/22 0829     Updated: 02/10/22 0901    Narrative:      MRI BRAIN WO CONTRAST-     HISTORY: Stroke, follow up; I63.9-Cerebral infarction, unspecified     TECHNIQUE: Multiplanar imaging was performed of the brain without  gadolinium infusion.     Diffusion sequences show increased signal compatible with restricted  diffusion within the left posterior basal ganglia. The largest area of  involvement is up to 12 mm which corresponds to the CT abnormality.  Appearance is compatible with an acute or early subacute lacunar  infarct. No areas of restricted diffusion are seen involving the  cortical tissues.     Mild edema is seen corresponding to the lacunar infarct within the left  basal ganglia.  There is no hemorrhage.     Mild periventricular white matter signal changes are seen compatible  with mild chronic microvascular disease.     Ventricles are normal. No mass or mass effect is present. No extra-axial  abnormal findings are identified.       Impression:      1. Acute or early subacute lacunar infarct of the left basal ganglia  corresponding to CT abnormality. No associated hemorrhage or significant  mass effect.  2. No evidence of cortical infarct.     This report was signed and finalized on 2/10/2022 8:59 AM by Noah Moon MD.    XR Chest 1 View [[id number]] Collected: 02/10/22 0737     Updated: 02/10/22 0739    Narrative:      PROCEDURE: XR CHEST 1 VW-     HISTORY: stroke , symptoms of CVA. Facial droop.     COMPARISON:  None     FINDINGS:  Portable view of the chest demonstrates the lungs to be  grossly clear. There is no evidence of effusion, pneumothorax or other  significant pleural disease. The mediastinum is unremarkable.     The heart size is normal.       Impression:      Unremarkable portable chest.      This report was signed and finalized on 2/10/2022 7:37 AM by Noah Moon MD.    CT Angiogram Neck [[id number]] Collected: 02/09/22 1823     Updated: 02/09/22 1926    Narrative:      FINAL REPORT    TECHNIQUE:  Thin section axial CT images were performed through the neck  after IV contrast.  3-D reconstruction images were submitted.  This study was performed with techniques to keep radiation doses  as low as reasonably achievable (ALARA). Individualized dose  reduction techniques using automated exposure control or  adjustment of mA and/or kV according to the patient's size were  employed.    CLINICAL HISTORY:  stroke    FINDINGS:  CTA neck:  NASCET technique utilized for stenosis evaluation.  Right carotid: No significant stenosis is seen of the cervical,  or internal carotid artery.  Left carotid: No significant  stenosis is seen of the cervical common or internal carotid  artery.   Vertebrals: The right vertebral artery is dominant and  the left vertebral artery is diminutive but patent.  No  significant stenosis is present.      Impression:      No significant stenosis or occlusion of the cervical common or  internal carotid arteries.    Authenticated by Emil Urias MD on 02/09/2022 06:23:45 PM    CT CEREBRAL PERFUSION WITH & WITHOUT CONTRAST [[id number]] Collected: 02/09/22 1822     Updated: 02/09/22 1925    Narrative:      FINAL REPORT    TECHNIQUE:  Multiple axial CT images were performed from the foramen magnum  to the vertex.  Perfusion images were also performed.  Coronal  reformatted images were reconstructed from axial data set.This  study was performed with techniques to keep radiation doses as  low as reasonably achievable, (ALARA). Individualized dose  reduction techniques using automated exposure control or  adjustment of mA and/or kV according to the patient's size were  employed.    CLINICAL HISTORY:  stroke symptoms    FINDINGS:  CT PERFUSION HEAD  FINDINGS: There is relatively symmetric  cerebral perfusion without significant cerebral blood flow or  blood volume or abnormalities specific for significant core  infarct.  There is no significant prolongation of the time to  max specific for ischemic penumbra.      Impression:      No specific CT evidence of significant core infarct.    Authenticated by Emil Urias MD on 02/09/2022 06:22:01 PM    CT Angiogram Head [[id number]] Collected: 02/09/22 1822     Updated: 02/09/22 1823    Narrative:      FINAL REPORT    TECHNIQUE:  Multiple axial CT angiography images were performed from the  foramen magnum to the vertex before and during IV contrast  administration. This study was performed with techniques to keep  radiation doses as low as reasonably achievable (ALARA).  Individualized dose reduction techniques using automated  exposure control or adjustment of mA and/or kV according to the  patient's size were employed.    CLINICAL  HISTORY:  stroke    FINDINGS:  No acute intracranial hemorrhage or large acute cortical  infarct.  The brain volume is normal for patient's age.  Ventricles are normal in size and configuration.  No midline  shift.  The basal cisterns are patent.  No skull fracture.  The  visualized paranasal sinuses and mastoid air cells are clear.  CTA HEAD: No evidence of vascular injury, hemodynamically  significant stenosis or major vessel occlusion.      Impression:      No acute intracranial hemorrhage or large acute cortical  infarct.    Authenticated by Emil Urias MD on 02/09/2022 06:22:28 PM    CT Head Without Contrast Stroke Protocol [[id number]] Collected: 02/09/22 1630     Updated: 02/09/22 1633    Narrative:      PROCEDURE: CT HEAD WO CONTRAST STROKE PROTOCOL-     HISTORY: Neuro deficit, acute, stroke suspected     TECHNIQUE: Noncontrast exam     FINDINGS: Diminished attenuation is noted of the left basal ganglia with  an oval hypodensity measuring 13 x 8 mm. This is suspicious for an acute  or early subacute large lacunar infarct. Remaining parenchyma is  homogeneous without evidence of hemorrhage, mass effect or edema. No  extra-axial abnormality is noted. Ventricles and cisterns appear normal.     The visualized sinuses, orbits and petrous temporal bones appear  unremarkable.       Impression:      Left basal ganglia lacunar infarcts likely acute or subacute  in age without hemorrhage        This study was performed with techniques to keep radiation doses as low  as reasonably achievable (ALARA). Individualized dose reduction  techniques using automated exposure control or adjustment of vA and/or  kV according to the patient size were employed.      This report was signed and finalized on 2/9/2022 4:31 PM by Noah Moon MD.          Echo:    Results for orders placed during the hospital encounter of 02/09/22    Adult Transthoracic Echo Complete W/ Cont if Necessary Per Protocol    Interpretation Summary  1.   Normal left ventricular size and systolic function, LVEF 65-70%.  2.  Abnormal LV diastolic filling pattern consistent with grade 2 diastolic dysfunction.  3.  Moderately increased left atrial volume index.  4.  Normal right ventricular size and systolic function.  5.  Mild tricuspid regurgitation, RVSP 27 mmHg.  6.  Negative bubble study with no evidence of intracardiac or intrapulmonary shunt.    Condition on Discharge:      Stable.    Code status during the hospital stay:    Code Status and Medical Interventions:   Ordered at: 02/09/22 2111     Code Status (Patient has no pulse and is not breathing):    CPR (Attempt to Resuscitate)     Medical Interventions (Patient has pulse or is breathing):    Full Support     Discharge Disposition:    Rehab Facility or Unit (DC - External)    Discharge Medications:       Discharge Medications      New Medications      Instructions Start Date   atorvastatin 80 MG tablet  Commonly known as: LIPITOR   80 mg, Oral, Nightly      clopidogrel 75 MG tablet  Commonly known as: PLAVIX   75 mg, Oral, Daily   Start Date: February 16, 2022        Continue These Medications      Instructions Start Date   aspirin 81 MG chewable tablet   81 mg, Oral, Daily      fish oil 1000 MG capsule capsule   1,000 mg, Oral, Daily With Breakfast      glipizide 10 MG tablet  Commonly known as: GLUCOTROL   10 mg, Oral, Daily      metFORMIN 1000 MG tablet  Commonly known as: GLUCOPHAGE   1,000 mg, Oral, Daily      multivitamin with minerals tablet tablet   1 tablet, Oral, Daily           Discharge Diet:     Carbohydrate consistent    Activity at Discharge:     As tolerated, PT/OT recommended    Follow-up Appointments:     Follow-up Information     Joseluis Keyes MD Follow up in 1 week(s).    Specialty: Internal Medicine  Contact information:  [address] [place], [place] [id number] [phone]                       No future appointments.  Test Results Pending at Discharge:           Radha  STEF Gutierrez DO  02/15/22  10:18 EST    Time: I spent 35 minutes on this discharge activity which included: face-to-face encounter with the patient, reviewing the data in the system, coordination of the care with the nursing staff as well as consultants, documentation, and entering orders.     Dictated utilizing Dragon dictation.

## 2022-02-16 ENCOUNTER — TELEPHONE (OUTPATIENT)
Dept: NEUROLOGY | Facility: CLINIC | Age: 72
End: 2022-02-16

## 2022-02-16 NOTE — TELEPHONE ENCOUNTER
Caller: CRISTIN     Relationship to patient: [facility]     Best call back number:[phone]    New or established patient?  [x] New  [] Established    Date of discharge: 2/15/2022    Facility discharged from:  [facility]     Diagnosis/Symptoms: CVA     Length of stay (If applicable): 6 DAYS    Specialty Only: Did you see a [facility] provider?    [x] Yes  [] No  If so, who? DR ESTRADA CONSULTED IN HOSP

## 2022-02-16 NOTE — TELEPHONE ENCOUNTER
Returned phone call to [facility] offered appointment. They prefer [place] location due to transportation  Charlene

## 2022-02-16 NOTE — TELEPHONE ENCOUNTER
THIS PT IS AT [facility] IN [place], THEY CAN TRANSPORT HER BUT IT HAS TO BE IN [place], CAN SOMEONE IN THE [place] OFFICE SEE THIS PT?

## 2022-02-23 ENCOUNTER — TELEPHONE (OUTPATIENT)
Dept: NEUROLOGY | Facility: CLINIC | Age: 72
End: 2022-02-23

## 2022-02-23 NOTE — TELEPHONE ENCOUNTER
Caller:  CRISTIN     Relationship to patient:  [facility]     Best call back number:  [phone]    New or established patient?  [x] New  [] Established    Date of discharge:02/25/2022  Facility [facility] from:  [facility]     Diagnosis/Symptoms:  CVA     Length of stay (If applicable):  7 DAYS     Specialty Only: Did you see a [facility] provider?    [x] Yes  [] No  If so, who? *[facility]     PT TO HAVE F/U ASAP PLEASE ADVISE  ON SCHEDULING

## 2022-03-23 ENCOUNTER — OFFICE VISIT (OUTPATIENT)
Dept: NEUROLOGY | Facility: CLINIC | Age: 72
End: 2022-03-23

## 2022-03-23 VITALS
SYSTOLIC BLOOD PRESSURE: 124 MMHG | BODY MASS INDEX: 24.94 KG/M2 | HEART RATE: 70 BPM | WEIGHT: 127 LBS | HEIGHT: 60 IN | OXYGEN SATURATION: 98 % | DIASTOLIC BLOOD PRESSURE: 72 MMHG | TEMPERATURE: 97.1 F

## 2022-03-23 DIAGNOSIS — Z86.73 HISTORY OF RECENT STROKE: Primary | ICD-10-CM

## 2022-03-23 PROCEDURE — 99214 OFFICE O/P EST MOD 30 MIN: CPT | Performed by: NURSE PRACTITIONER

## 2022-03-23 RX ORDER — METFORMIN HYDROCHLORIDE 500 MG/1
500 TABLET, EXTENDED RELEASE ORAL
COMMUNITY
Start: 2022-03-07 | End: 2023-03-07

## 2022-03-23 RX ORDER — BLOOD-GLUCOSE METER
EACH MISCELLANEOUS
COMMUNITY
Start: 2022-02-25

## 2022-03-23 RX ORDER — BLOOD SUGAR DIAGNOSTIC
STRIP MISCELLANEOUS
COMMUNITY
Start: 2022-02-25

## 2022-03-23 NOTE — PATIENT INSTRUCTIONS
You will continue taking the 81mg aspirin and Plavix (clopidogrel) until the first week of May. After May 9th, you will change your aspirin to a full strength (325mg) every day, along with the atorvastatin. You will not need Plavix anymore after May 9th.

## 2022-03-23 NOTE — PROGRESS NOTES
New Neurology Patient Office Visit      Patient Name: Deborah Jones    Referring Physician: No ref. provider found    Chief Complaint:    Chief Complaint   Patient presents with   • Advice Only     Pt in office for hospital follow up. CVA       History of Present Illness: Deborah Jones is a very pleasant RHD 71 y.o. female who is here today to establish care with Neurology.  She was referred after suffering a stroke in February 2022.   She reports that she was in [place] while gardening in February, she went into the house and a family member noticed a right facial droop and insisted that she go to the hospital. Interestingly, she also recalls that when she called her father to wish him a happy birthday, he had difficulty understanding her speech. Hospital records note that patient reported feeling 'heaviness' in RLE that morning as well. Underwent imaging which showed acute-subacute infarct in L BG, was not candidate for tPA. She received Plavix load and ASA in ED, no further complications while in hospital overnight. She was discharged to [facility], and has returned home without any further episodes concerning for stroke. She has been following with PCP and cardiologist. Wearing Zio patch, reports that she is seeing cardiologist at [facility]. The was placed on 3/9 for 30 day monitoring. She has been compliant with daily ASA 81mg, Plavix 75, and statin. She was on ASA 81mg daily prior to CVA.   She is scheduled to begin outpatient rehab at [facility] next week with PT/OT/SLP.     Inpatient HPI 2/09/2022:  71AAF pmh non insulin dependent t2dm woke up this am with heaviness in R leg that has since resolved. At around 2:30PM she developed R sided facial droop which prompted her to to present to ED. Since then R facial droop seems to be resolving. It is a/w dysarthria. No numbness/tingling. No change in vision. No h/o seizures or cva. No h/o heart disease. She takes a daily baby aspirin  Cta  head/neck no significant stenosis/occlusion  Ct head wo: Left basal ganglia lacunar infarcts likely acute or subacute  in age without hemorrhage  CT  Cerebral perfusion: No specific CT evidence of significant core infarct.  In the ED she was given 300mg plavix and 325mg aspirin.    The following portions of the patient's history were reviewed and updated as appropriate: allergies, current medications, past family history, past medical history, past social history, past surgical history and problem list.    Subjective      Review of Systems:   Review of Systems   Constitutional: Negative.    Respiratory: Negative.    Cardiovascular: Negative.    Gastrointestinal: Negative.    Genitourinary: Negative.    Musculoskeletal: Negative.    Neurological: Positive for facial asymmetry.   Hematological: Bruises/bleeds easily.   Psychiatric/Behavioral: Positive for stress.     Past Medical History:   Past Medical History:   Diagnosis Date   • Impaired functional mobility, balance, gait, and endurance    • Stroke (cerebrum) (HCC)      Past Surgical History: History reviewed. No pertinent surgical history.    Family History: History reviewed. No pertinent family history.    Social History:   Social History     Socioeconomic History   • Marital status:    Tobacco Use   • Smoking status: Never Smoker   • Smokeless tobacco: Never Used   Substance and Sexual Activity   • Alcohol use: Yes     Alcohol/week: 1.0 standard drink     Types: 1 Glasses of wine per week     Comment: occas.     Medications:     Current Outpatient Medications:   •  aspirin 81 MG chewable tablet, Chew 81 mg Daily., Disp: , Rfl:   •  atorvastatin (LIPITOR) 80 MG tablet, Take 1 tablet by mouth Every Night., Disp: 30 tablet, Rfl: 0  •  clopidogrel (PLAVIX) 75 MG tablet, Take 1 tablet by mouth Daily., Disp: 30 tablet, Rfl: 0  •  glipizide (GLUCOTROL) 10 MG tablet, Take 10 mg by mouth Daily., Disp: , Rfl:   •  metFORMIN ER (GLUCOPHAGE-XR) 500 MG 24 hr tablet,  "Take 500 mg by mouth., Disp: , Rfl:   •  multivitamin with minerals tablet tablet, Take 1 tablet by mouth Daily., Disp: , Rfl:   •  Omega-3 Fatty Acids (fish oil) 1000 MG capsule capsule, Take 1,000 mg by mouth Daily With Breakfast., Disp: , Rfl:   •  Blood Glucose Monitoring Suppl (ONE TOUCH ULTRA 2) w/Device kit, , Disp: , Rfl:   •  OneTouch Ultra test strip, , Disp: , Rfl:     Allergies:   No Known Allergies    Objective     Physical Exam:  Vital Signs:   Vitals:    03/23/22 1057   BP: 124/72   Pulse: 70   Temp: 97.1 °F (36.2 °C)   SpO2: 98%   Weight: 57.6 kg (127 lb)   Height: 152.4 cm (60\")   PainSc: 0-No pain       Physical Exam  Vitals and nursing note reviewed.   Constitutional:       Comments:   Well dressed  Well groomed   Eyes:      Extraocular Movements: EOM normal.      Pupils: Pupils are equal, round, and reactive to light.   Neck:      Vascular: No carotid bruit.   Cardiovascular:      Rate and Rhythm: Regular rhythm.      Heart sounds: Normal heart sounds.   Pulmonary:      Effort: Pulmonary effort is normal.      Breath sounds: Normal breath sounds.   Skin:     General: Skin is warm.   Neurological:      Mental Status: She is oriented to person, place, and time.      Coordination: Romberg Test normal.      Gait: Gait is intact.   Psychiatric:         Mood and Affect: Mood normal.         Speech: Speech normal.         Behavior: Behavior normal.         Thought Content: Thought content normal.         Judgment: Judgment normal.       Neurologic Exam     Mental Status   Oriented to person, place, and time.   Attention: normal. Concentration: normal.   Speech: speech is normal   Level of consciousness: alert  Knowledge: good.   Normal comprehension.     Excellent medical historian     Cranial Nerves     CN II   Visual fields full to confrontation.   Visual acuity: normal    CN III, IV, VI   Pupils are equal, round, and reactive to light.  Extraocular motions are normal.   Right pupil: Shape: regular. " Reactivity: brisk.   Left pupil: Shape: regular. Reactivity: brisk.   Diplopia: none  Ophthalmoparesis: none  Upgaze: normal  Downgaze: normal    CN V   Facial sensation intact.     CN VII   Right facial weakness: central ( mild)  Left facial weakness: none    CN VIII   CN VIII normal.     CN IX, X   CN IX normal.   CN X normal.     CN XI   CN XI normal.     CN XII   CN XII normal.     Motor Exam   Muscle bulk: normal  Overall muscle tone: normal  Right arm pronator drift: absent  Left arm pronator drift: absent    Strength   Strength 5/5 except as noted.   Right quadriceps: 4/5    Sensory Exam   Light touch normal.     Gait, Coordination, and Reflexes     Gait  Gait: normal    Coordination   Romberg: negative    Tremor   Resting tremor: absent    Reflexes   Reflexes 2+ except as noted.    CT Angiogram Neck    Result Date: 2/9/2022  No significant stenosis or occlusion of the cervical common or internal carotid arteries. Authenticated by Emil rUias MD on 02/09/2022 06:23:45 PM    MRI Brain Without Contrast    Result Date: 2/10/2022  1. Acute or early subacute lacunar infarct of the left basal ganglia corresponding to CT abnormality. No associated hemorrhage or significant mass effect. 2. No evidence of cortical infarct.  This report was signed and finalized on 2/10/2022 8:59 AM by Noah Moon MD.    XR Chest 1 View    Result Date: 2/10/2022  Unremarkable portable chest.  This report was signed and finalized on 2/10/2022 7:37 AM by Noah Moon MD.    CT Angiogram Head    Result Date: 2/9/2022  No acute intracranial hemorrhage or large acute cortical infarct. Authenticated by Emil Urias MD on 02/09/2022 06:22:28 PM    CT Head Without Contrast Stroke Protocol    Result Date: 2/9/2022  Left basal ganglia lacunar infarcts likely acute or subacute in age without hemorrhage   This study was performed with techniques to keep radiation doses as low as reasonably achievable (ALARA). Individualized dose reduction  techniques using automated exposure control or adjustment of vA and/or kV according to the patient size were employed.  This report was signed and finalized on 2/9/2022 4:31 PM by Noah Moon MD.    CT CEREBRAL PERFUSION WITH & WITHOUT CONTRAST    Result Date: 2/9/2022  No specific CT evidence of significant core infarct. Authenticated by Emil Urias MD on 02/09/2022 06:22:01 PM    Results Review:   I reviewed the patient's new clinical results.  I have reviewed the patient's other medical records to include, labs, radiology and referrals.   I reviewed the patient's new imaging results and agree with the interpretation.  Hospitalization and previous provider records reviewed from February 2022 through present    Assessment / Plan      Assessment/Plan:   Diagnoses and all orders for this visit:    1. Cerebrovascular accident (CVA), unspecified mechanism (HCC) (Primary)     This patient is a very pleasant 70 yo RHD AAF who suffered left basal ganglia infarct in February. She is recovering well, has been diligent with follow up appointments and daily medications.  She will continue taking aspirin 81 mg and Plavix 75 mg daily until May 9, at which time she will complete 3 months of DAPT and will discontinue Plavix and increase aspirin to 325 mg daily.  Continue wearing Zio patch, appreciate cardiology recommendations.  May need to change to DOAC if identifiable arrhythmia.  Continue to work with primary care to optimize management of hyperlipidemia, hypertension, and type 2 diabetes.  She reports that she has increased her exercise, and is diligent with her diet.  Discussed signs and symptoms of stroke and when to call 911. Instructed to follow a low fat diet including the Mediterranean diet. Instructed to take all medications daily as prescribed. Encouraged 30 minutes of exercise 3-4 times a week as tolerated. Stay well hydrated. Discussed potential side effects of new medications and signs and symptoms to report.  If patient is currently using tobacco products, smoking cessation was encouraged. Reviewed stroke risk factors and stroke prevention plan. Patient and/or family verbalizes understanding and agrees with plan.     Inpatient Neurology Assessment/Plan:  71-year-old right-handed black female with known diagnosis of hypertension, diabetes, smoking cigars, who has been admitted with right-sided weakness and found to have a left anterior choroidal stroke.        1. Left anterior choroidal stroke.  Lacunar versus cardioembolic in etiology. Plan to continue her on dual antiplatelets, with aspirin 81 mg daily, Plavix 75 mg and Lipitor 80 mg daily for secondary stroke prevention.  DC Plavix after 3 months and increase the aspirin to 325 mg at the time.  Patient's 2D echocardiogram showed increased left atrial volume index, recommend to get her Zio patch to look for possible underlying atrial arrhythmias.  If patient is found to have A. fib, recommend to start her on anticoagulation.  2. Essential hypertension.  Patient's blood pressures have returned to normal with no intervention, and she has not had any worsening of the symptoms.  Continue normal blood pressure goals.  3. Diabetes mellitus type 2 uncontrolled with hyperglycemia.  Her A1c is 7.8, goal of less than seven.  Avoid hypoglycemia.  Maintain normoglycemia  4. Mixed hyperlipidemia.  Continue full dose of statins.  5. Smoking.  Encouraged her to quit smoking cigars.    Follow Up:   Return in about 3 months (around 6/23/2022) for Next scheduled follow up.     DAVID Peters  [facility]   AS THE PROVIDER, I PERSONALLY WORE PPE DURING ENTIRE FACE TO FACE ENCOUNTER IN CLINIC WITH THE PATIENT. PATIENT ALSO WORE PPE DURING ENTIRE FACE TO FACE ENCOUNTER EXCEPT FOR A MAX OF 30 SECONDS DURING NEUROLOGICAL EVALUATION OF CRANIAL NERVES AND THEN MASK WAS PLACED BACK OVER PATIENT FACE FOR REMAINDER OF VISIT. I WASHED MY HANDS BEFORE AND AFTER  VISIT.    Please note that portions of this note may have been completed with a voice recognition program. Efforts were made to edit the dictations, but occasionally words are mistranscribed.

## 2022-06-23 ENCOUNTER — OFFICE VISIT (OUTPATIENT)
Dept: NEUROLOGY | Facility: CLINIC | Age: 72
End: 2022-06-23

## 2022-06-23 VITALS
HEART RATE: 78 BPM | DIASTOLIC BLOOD PRESSURE: 70 MMHG | HEIGHT: 60 IN | TEMPERATURE: 97.7 F | SYSTOLIC BLOOD PRESSURE: 120 MMHG | WEIGHT: 127 LBS | BODY MASS INDEX: 24.94 KG/M2 | OXYGEN SATURATION: 99 %

## 2022-06-23 DIAGNOSIS — Z86.73 HISTORY OF RECENT STROKE: Primary | ICD-10-CM

## 2022-06-23 DIAGNOSIS — I69.30 SEQUELAE, POST-STROKE: ICD-10-CM

## 2022-06-23 PROCEDURE — 99214 OFFICE O/P EST MOD 30 MIN: CPT | Performed by: NURSE PRACTITIONER

## 2022-06-23 RX ORDER — GLIPIZIDE 5 MG/1
TABLET ORAL
COMMUNITY
Start: 2022-03-29

## 2022-06-23 RX ORDER — TRIAMCINOLONE ACETONIDE 1 MG/G
CREAM TOPICAL
COMMUNITY
Start: 2022-05-11

## 2022-06-23 RX ORDER — ASPIRIN 325 MG
325 TABLET ORAL DAILY
COMMUNITY

## 2022-06-23 NOTE — PATIENT INSTRUCTIONS
Please increase your aspirin to 325mg daily per stroke guidelines. Since you were already taking 81mg, we feel that this is not enough to prevent another stroke, which is why we recommend the higher dose.

## 2022-06-23 NOTE — PROGRESS NOTES
Follow Up Neurology Office Visit      Patient Name: Deborah Jones    Referring Physician: No ref. provider found    Chief Complaint:    Chief Complaint   Patient presents with   • Follow-up     Pt in office to follow up on stroke       History of Present Illness: Deborah Jones is a 71 y.o. female who is here to follow up with Neurology after suffering a stroke in February 2022.  She reports that she is doing well.  Continues with outpatient speech therapy.  Denies any episodes concerning for stroke.  She completed [facility]OT monitoring.  Reports that she has been taking high-dose statin and 81 mg aspirin daily.    The following portions of the patient's history were reviewed and updated as appropriate: allergies, current medications, past family history, past medical history, past social history, past surgical history and problem list.    Subjective     Review of Systems:   Review of Systems   Neurological: Positive for speech difficulty and confusion.   All other systems reviewed and are negative.    Medications:     Current Outpatient Medications:   •  aspirin 325 MG tablet, Take 325 mg by mouth Daily., Disp: , Rfl:   •  atorvastatin (LIPITOR) 80 MG tablet, Take 1 tablet by mouth Every Night., Disp: 30 tablet, Rfl: 0  •  Blood Glucose Monitoring Suppl (ONE TOUCH ULTRA 2) w/Device kit, , Disp: , Rfl:   •  glipizide (GLUCOTROL) 5 MG tablet, , Disp: , Rfl:   •  metFORMIN ER (GLUCOPHAGE-XR) 500 MG 24 hr tablet, Take 500 mg by mouth., Disp: , Rfl:   •  multivitamin with minerals tablet tablet, Take 1 tablet by mouth Daily., Disp: , Rfl:   •  Omega-3 Fatty Acids (fish oil) 1000 MG capsule capsule, Take 1,000 mg by mouth Daily With Breakfast., Disp: , Rfl:   •  OneTouch Ultra test strip, , Disp: , Rfl:   •  triamcinolone (KENALOG) 0.1 % cream, , Disp: , Rfl:     Allergies:   No Known Allergies    Objective     Physical Exam:  Vital Signs:   Vitals:    06/23/22 1027   BP: 120/70   BP Location: Left arm   Patient Position:  "Sitting   Cuff Size: Adult   Pulse: 78   Temp: 97.7 °F (36.5 °C)   SpO2: 99%   Weight: 57.6 kg (127 lb)   Height: 152.4 cm (60\")   PainSc: 0-No pain     Physical Exam  Vitals and nursing note reviewed.   Constitutional:       Comments:   Well dressed  Well groomed   Pulmonary:      Effort: Pulmonary effort is normal.   Neurological:      Mental Status: She is oriented to person, place, and time. Mental status is at baseline.      Gait: Gait is intact.      Deep Tendon Reflexes: Strength normal.   Psychiatric:         Mood and Affect: Mood normal.         Speech: Speech normal.         Behavior: Behavior normal.       Neurologic Exam     Mental Status   Oriented to person, place, and time.   Attention: normal. Concentration: normal.   Speech: speech is normal   Level of consciousness: alert    Cranial Nerves   Cranial nerves II through XII intact.     Improved NLF flattening     Motor Exam   Muscle bulk: normal  Overall muscle tone: normal  Right arm pronator drift: absent  Left arm pronator drift: absent    Strength   Strength 5/5 throughout.     Gait, Coordination, and Reflexes     Gait  Gait: normal    Tremor   Resting tremor: absent    Results Review:   I reviewed the patient's new clinical results.  I have reviewed the patient's other medical records to include, labs, radiology and referrals.   Interval medical history reviewed     Assessment / Plan      Assessment/Plan:   Diagnoses and all orders for this visit:    1. History of recent stroke (Primary)  -     Ambulatory Referral to Cardiology    2. Sequelae, post-stroke    1. Patient reports that she has been taking only aspirin 81 mg daily per her PCP recommendation, I discussed with her that neurology recommendation is to increase to 325 as she was previously taking 81 mg.  We reviewed risks of possible GI bleeding and potential for renal dysfunction with long-term use, but benefit of stroke prevention.  Patient also seems surprised by recommendation for loop " recorder, although this is documented in primary care note.  She has not been evaluated for loop recorder implant, I have placed referral to Dr. Dupree today.  We discussed that this will give us better long-term data on identifying cause of stroke,Left anterior choroidal stroke appears lacunar versus cardioembolic in etiology.  I have explained that if cardiac arrhythmia is detected, will need to adjust anticoagulation for best chance at preventing recurrent stroke.  She acknowledged this information and agreed to cardiology evaluation.   She does seem to have some confusion regarding medical care today, uncertain if this is due to multiple care providers or some difficulty with cognition.  Obtain MMSE next visit.  Written instructions for aspirin monotherapy provided today, patient also provided with information on implantable loop recorder today.    Follow Up:   Return in about 6 months (around 12/23/2022) for Next scheduled follow up.     DAVID Peters  [facility]   >30 minutes total time spent in visit today obtaining interval HPI, reviewing medications at length, examining patient, discussing medication and treatment plan at length, and developing plan of care.  Please note that portions of this note may have been completed with a voice recognition program. Efforts were made to edit the dictations, but occasionally words are mistranscribed.

## 2022-09-08 ENCOUNTER — HOSPITAL ENCOUNTER (EMERGENCY)
Facility: HOSPITAL | Age: 72
Discharge: HOME | End: 2022-09-08
Attending: EMERGENCY MEDICINE | Admitting: EMERGENCY MEDICINE

## 2022-09-08 ENCOUNTER — APPOINTMENT (OUTPATIENT)
Dept: GENERAL RADIOLOGY | Facility: HOSPITAL | Age: 72
End: 2022-09-08

## 2022-09-08 ENCOUNTER — APPOINTMENT (OUTPATIENT)
Dept: CT IMAGING | Facility: HOSPITAL | Age: 72
End: 2022-09-08

## 2022-09-08 ENCOUNTER — TELEPHONE (OUTPATIENT)
Dept: NEUROLOGY | Facility: CLINIC | Age: 72
End: 2022-09-08

## 2022-09-08 VITALS
TEMPERATURE: 98.3 F | OXYGEN SATURATION: 98 % | HEIGHT: 59 IN | WEIGHT: 128 LBS | SYSTOLIC BLOOD PRESSURE: 133 MMHG | BODY MASS INDEX: 25.8 KG/M2 | DIASTOLIC BLOOD PRESSURE: 104 MMHG | RESPIRATION RATE: 17 BRPM | HEART RATE: 72 BPM

## 2022-09-08 DIAGNOSIS — M31.6 TEMPORAL ARTERITIS: ICD-10-CM

## 2022-09-08 DIAGNOSIS — R70.0 ELEVATED SED RATE: ICD-10-CM

## 2022-09-08 DIAGNOSIS — R51.9 HEADACHE AROUND THE EYES: Primary | ICD-10-CM

## 2022-09-08 LAB
ALBUMIN SERPL-MCNC: 4.4 G/DL (ref 3.5–5.2)
ALBUMIN/GLOB SERPL: 1 G/DL
ALP SERPL-CCNC: 161 U/L (ref 39–117)
ALT SERPL W P-5'-P-CCNC: 19 U/L (ref 1–33)
ANION GAP SERPL CALCULATED.3IONS-SCNC: 12 MMOL/L (ref 5–15)
AST SERPL-CCNC: 19 U/L (ref 1–32)
BASOPHILS # BLD AUTO: 0.01 10*3/MM3 (ref 0–0.2)
BASOPHILS NFR BLD AUTO: 0.2 % (ref 0–1.5)
BILIRUB SERPL-MCNC: 0.2 MG/DL (ref 0–1.2)
BUN SERPL-MCNC: 17 MG/DL (ref 8–23)
BUN/CREAT SERPL: 14.8 (ref 7–25)
CALCIUM SPEC-SCNC: 10.3 MG/DL (ref 8.6–10.5)
CHLORIDE SERPL-SCNC: 103 MMOL/L (ref 98–107)
CO2 SERPL-SCNC: 24 MMOL/L (ref 22–29)
CREAT SERPL-MCNC: 1.15 MG/DL (ref 0.57–1)
DEPRECATED RDW RBC AUTO: 42.6 FL (ref 37–54)
EGFRCR SERPLBLD CKD-EPI 2021: 51 ML/MIN/1.73
EOSINOPHIL # BLD AUTO: 0.29 10*3/MM3 (ref 0–0.4)
EOSINOPHIL NFR BLD AUTO: 4.5 % (ref 0.3–6.2)
ERYTHROCYTE [DISTWIDTH] IN BLOOD BY AUTOMATED COUNT: 13.2 % (ref 12.3–15.4)
ERYTHROCYTE [SEDIMENTATION RATE] IN BLOOD: 103 MM/HR (ref 0–30)
FLUAV SUBTYP SPEC NAA+PROBE: NOT DETECTED
FLUBV RNA ISLT QL NAA+PROBE: NOT DETECTED
GLOBULIN UR ELPH-MCNC: 4.4 GM/DL
GLUCOSE SERPL-MCNC: 109 MG/DL (ref 65–99)
HCT VFR BLD AUTO: 33.7 % (ref 34–46.6)
HGB BLD-MCNC: 10.7 G/DL (ref 12–15.9)
IMM GRANULOCYTES # BLD AUTO: 0.02 10*3/MM3 (ref 0–0.05)
IMM GRANULOCYTES NFR BLD AUTO: 0.3 % (ref 0–0.5)
LYMPHOCYTES # BLD AUTO: 2.27 10*3/MM3 (ref 0.7–3.1)
LYMPHOCYTES NFR BLD AUTO: 35.1 % (ref 19.6–45.3)
MCH RBC QN AUTO: 28 PG (ref 26.6–33)
MCHC RBC AUTO-ENTMCNC: 31.8 G/DL (ref 31.5–35.7)
MCV RBC AUTO: 88.2 FL (ref 79–97)
MONOCYTES # BLD AUTO: 0.36 10*3/MM3 (ref 0.1–0.9)
MONOCYTES NFR BLD AUTO: 5.6 % (ref 5–12)
NEUTROPHILS NFR BLD AUTO: 3.51 10*3/MM3 (ref 1.7–7)
NEUTROPHILS NFR BLD AUTO: 54.3 % (ref 42.7–76)
NRBC BLD AUTO-RTO: 0 /100 WBC (ref 0–0.2)
PLATELET # BLD AUTO: 585 10*3/MM3 (ref 140–450)
PMV BLD AUTO: 8.1 FL (ref 6–12)
POTASSIUM SERPL-SCNC: 4.7 MMOL/L (ref 3.5–5.2)
PROT SERPL-MCNC: 8.8 G/DL (ref 6–8.5)
QT INTERVAL: 400 MS
QTC INTERVAL: 400 MS
RBC # BLD AUTO: 3.82 10*6/MM3 (ref 3.77–5.28)
SARS-COV-2 RNA PNL SPEC NAA+PROBE: NOT DETECTED
SODIUM SERPL-SCNC: 139 MMOL/L (ref 136–145)
TROPONIN T SERPL-MCNC: 0.01 NG/ML (ref 0–0.03)
WBC NRBC COR # BLD: 6.46 10*3/MM3 (ref 3.4–10.8)

## 2022-09-08 PROCEDURE — 87636 SARSCOV2 & INF A&B AMP PRB: CPT | Performed by: EMERGENCY MEDICINE

## 2022-09-08 PROCEDURE — 85652 RBC SED RATE AUTOMATED: CPT | Performed by: EMERGENCY MEDICINE

## 2022-09-08 PROCEDURE — 70450 CT HEAD/BRAIN W/O DYE: CPT

## 2022-09-08 PROCEDURE — 99284 EMERGENCY DEPT VISIT MOD MDM: CPT

## 2022-09-08 PROCEDURE — 80053 COMPREHEN METABOLIC PANEL: CPT | Performed by: EMERGENCY MEDICINE

## 2022-09-08 PROCEDURE — 85025 COMPLETE CBC W/AUTO DIFF WBC: CPT | Performed by: EMERGENCY MEDICINE

## 2022-09-08 PROCEDURE — 71045 X-RAY EXAM CHEST 1 VIEW: CPT

## 2022-09-08 PROCEDURE — 84484 ASSAY OF TROPONIN QUANT: CPT | Performed by: EMERGENCY MEDICINE

## 2022-09-08 PROCEDURE — 63710000001 PREDNISONE PER 1 MG: Performed by: EMERGENCY MEDICINE

## 2022-09-08 PROCEDURE — 36415 COLL VENOUS BLD VENIPUNCTURE: CPT

## 2022-09-08 PROCEDURE — 93005 ELECTROCARDIOGRAM TRACING: CPT | Performed by: EMERGENCY MEDICINE

## 2022-09-08 RX ORDER — PREDNISONE 20 MG/1
60 TABLET ORAL ONCE
Status: COMPLETED | OUTPATIENT
Start: 2022-09-08 | End: 2022-09-08

## 2022-09-08 RX ORDER — PREDNISONE 20 MG/1
60 TABLET ORAL DAILY
Qty: 42 TABLET | Refills: 0 | Status: SHIPPED | OUTPATIENT
Start: 2022-09-08 | End: 2022-09-22

## 2022-09-08 RX ORDER — SODIUM CHLORIDE 0.9 % (FLUSH) 0.9 %
10 SYRINGE (ML) INJECTION AS NEEDED
Status: DISCONTINUED | OUTPATIENT
Start: 2022-09-08 | End: 2022-09-08 | Stop reason: HOSPADM

## 2022-09-08 RX ADMIN — PREDNISONE 60 MG: 20 TABLET ORAL at 14:47

## 2022-09-08 NOTE — TELEPHONE ENCOUNTER
RED FLAG    Caller: Deborah Jones    Relationship to patient: Self    Best call back number: [phone]    Chief complaint: PT CALLED STATING THAT OVER THE PAST COUPLE OF DAYS SHE HAS BEEN HAVING HEADACHES. PT DESCRIBES HEADACHES AS MODERATE-TO-SEVERE AND STATES HER HEAD IS THROBBING LIKE A TOOTHACHE. PT STATES SHE HAS NO PRIOR HX OF HEADACHES OR MIGRAINES. PT DENIES ANY OTHER STROKE-LIKE SYMPTOMS, HOWEVER, I DID ADVISE PT TO GO TO THE NEAREST ED FOR FURTHER EVAL DUE TO HER RECENT PAST STROKE HX. PT VERBALIZED UNDERSTANDING AND STATED SHE WOULD GO TO [facility] ED FOR FURTHER EVAL.    **I DID OFFER TO CALL 911 ON PT'S BEHALF. PT DECLINED AND ADVISED SHE FELT SHE COULD SAFELY GET HERSELF TO THE ED.    Patient directed to call 911 or go to their nearest emergency room.     Patient verbalized understanding: [x] Yes  [] No    DOCUMENTING PER [employer].

## 2022-09-08 NOTE — TELEPHONE ENCOUNTER
Attempted to contact patient to schedule sooner appointment and see if she went to the ER.     Patient is currently in the ER and stated she would call us back to schedule a sooner appt.

## 2022-09-08 NOTE — ED PROVIDER NOTES
Subjective   PIT    This is a delightful 72-year-old female who is accompanied by her daughter.  At her baseline she had a stroke in February but recovered very well from it and she is completely independent in all ADLs she drives a car and gets out and does what ever she wants to do.    She is followed by neurology in [place].  She is on aspirin currently.    She presents to the emergency department with insidious onset of headache about 3 to 4 days duration.  It is left more than right feels throbbing almost like a tooth ache nothing seem to precipitate it.  Patient does report increased stress recently this had stress before never had headaches with it.  She has had no head trauma or change in medications.  The headache seems to be worse in the evening but does not seem to be positional.  She did not have a headache with her stroke in the past.  She has had no visual field changes.  She has had no change in hearing smell or taste.  She reports she saw the dentist about a week ago and they held off doing any dental work on her until December.  She has no tooth pain.  She had a recent eye exam that apparently was acceptable.    She has had no fevers or chills.  She has had no neck stiffness.  Some the pain in her head goes into her left face and occasionally is lancing in nature.  She reports bowel movements and urination of been normal.  No chest pain.  No focal weakness.  She is recovered very well from her stroke.    No history of vasculitis.  No history of headaches or migraines in the past.        All other systems reviewed and are negative except as noted above.          Review of Systems   All other systems reviewed and are negative.      Past Medical History:   Diagnosis Date   • Impaired functional mobility, balance, gait, and endurance    • Stroke (cerebrum) (HCC)        No Known Allergies    No past surgical history on file.    No family history on file.    Social History     Socioeconomic History   •  Marital status: Legally    Tobacco Use   • Smoking status: Never Smoker   • Smokeless tobacco: Never Used   Substance and Sexual Activity   • Alcohol use: Yes     Alcohol/week: 1.0 standard drink     Types: 1 Glasses of wine per week     Comment: occas.           Objective   Physical Exam  Vitals and nursing note reviewed.   Constitutional:       Appearance: She is normal weight.      Comments: Delightful 72-year-old alert and oriented GCS 15.   HENT:      Head:      Comments: Scalp and head carefully examined no vesicles.  She is not particularly tender in the left face or left head.  No skin lesions are noted.     Right Ear: Tympanic membrane, ear canal and external ear normal.      Left Ear: Tympanic membrane, ear canal and external ear normal.      Nose: Nose normal.      Mouth/Throat:      Mouth: Mucous membranes are moist.      Pharynx: Oropharynx is clear.   Eyes:      Extraocular Movements: Extraocular movements intact.      Conjunctiva/sclera: Conjunctivae normal.      Pupils: Pupils are equal, round, and reactive to light.      Comments: Globes are palpated through close lids are nontender.   Neck:      Vascular: No carotid bruit.   Cardiovascular:      Rate and Rhythm: Normal rate and regular rhythm.      Pulses: Normal pulses.      Heart sounds: Normal heart sounds.   Pulmonary:      Effort: Pulmonary effort is normal.      Breath sounds: Normal breath sounds.   Abdominal:      General: Abdomen is flat. Bowel sounds are normal. There is no distension.      Palpations: Abdomen is soft. There is no mass.      Tenderness: There is no abdominal tenderness.   Musculoskeletal:         General: No swelling, tenderness or deformity. Normal range of motion.      Cervical back: Normal range of motion and neck supple. No rigidity or tenderness.   Lymphadenopathy:      Cervical: No cervical adenopathy.   Skin:     General: Skin is warm and dry.      Capillary Refill: Capillary refill takes less than 2  seconds.   Neurological:      Mental Status: She is alert and oriented to person, place, and time.      Comments: Face symmetric voice strong tongue midline.  Vision, hearing, and speech preserved.  No focal weakness.         Procedures           ED Course            Recent Results (from the past 24 hour(s))   COVID-19 and FLU A/B PCR - Swab, Nasopharynx    Collection Time: 09/08/22 10:04 AM    Specimen: Nasopharynx; Swab   Result Value Ref Range    COVID19 Not Detected Not Detected - Ref. Range    Influenza A PCR Not Detected Not Detected    Influenza B PCR Not Detected Not Detected   ECG 12 Lead    Collection Time: 09/08/22 11:37 AM   Result Value Ref Range    QT Interval 400 ms    QTC Interval 400 ms   Comprehensive Metabolic Panel    Collection Time: 09/08/22 12:21 PM    Specimen: Blood   Result Value Ref Range    Glucose 109 (H) 65 - 99 mg/dL    BUN 17 8 - 23 mg/dL    Creatinine 1.15 (H) 0.57 - 1.00 mg/dL    Sodium 139 136 - 145 mmol/L    Potassium 4.7 3.5 - 5.2 mmol/L    Chloride 103 98 - 107 mmol/L    CO2 24.0 22.0 - 29.0 mmol/L    Calcium 10.3 8.6 - 10.5 mg/dL    Total Protein 8.8 (H) 6.0 - 8.5 g/dL    Albumin 4.40 3.50 - 5.20 g/dL    ALT (SGPT) 19 1 - 33 U/L    AST (SGOT) 19 1 - 32 U/L    Alkaline Phosphatase 161 (H) 39 - 117 U/L    Total Bilirubin 0.2 0.0 - 1.2 mg/dL    Globulin 4.4 gm/dL    A/G Ratio 1.0 g/dL    BUN/Creatinine Ratio 14.8 7.0 - 25.0    Anion Gap 12.0 5.0 - 15.0 mmol/L    eGFR 51.0 (L) >60.0 mL/min/1.73   Troponin    Collection Time: 09/08/22 12:21 PM    Specimen: Blood   Result Value Ref Range    Troponin T 0.013 0.000 - 0.030 ng/mL   Sedimentation Rate    Collection Time: 09/08/22 12:21 PM    Specimen: Blood   Result Value Ref Range    Sed Rate 103 (H) 0 - 30 mm/hr   CBC Auto Differential    Collection Time: 09/08/22 12:21 PM    Specimen: Blood   Result Value Ref Range    WBC 6.46 3.40 - 10.80 10*3/mm3    RBC 3.82 3.77 - 5.28 10*6/mm3    Hemoglobin 10.7 (L) 12.0 - 15.9 g/dL    Hematocrit  33.7 (L) 34.0 - 46.6 %    MCV 88.2 79.0 - 97.0 fL    MCH 28.0 26.6 - 33.0 pg    MCHC 31.8 31.5 - 35.7 g/dL    RDW 13.2 12.3 - 15.4 %    RDW-SD 42.6 37.0 - 54.0 fl    MPV 8.1 6.0 - 12.0 fL    Platelets 585 (H) 140 - 450 10*3/mm3    Neutrophil % 54.3 42.7 - 76.0 %    Lymphocyte % 35.1 19.6 - 45.3 %    Monocyte % 5.6 5.0 - 12.0 %    Eosinophil % 4.5 0.3 - 6.2 %    Basophil % 0.2 0.0 - 1.5 %    Immature Grans % 0.3 0.0 - 0.5 %    Neutrophils, Absolute 3.51 1.70 - 7.00 10*3/mm3    Lymphocytes, Absolute 2.27 0.70 - 3.10 10*3/mm3    Monocytes, Absolute 0.36 0.10 - 0.90 10*3/mm3    Eosinophils, Absolute 0.29 0.00 - 0.40 10*3/mm3    Basophils, Absolute 0.01 0.00 - 0.20 10*3/mm3    Immature Grans, Absolute 0.02 0.00 - 0.05 10*3/mm3    nRBC 0.0 0.0 - 0.2 /100 WBC     Note: In addition to lab results from this visit, the labs listed above may include labs taken at another facility or during a different encounter within the last 24 hours. Please correlate lab times with ED admission and discharge times for further clarification of the services performed during this visit.    XR Chest 1 View   Final Result   No evidence of acute disease in the chest.        This report was finalized on 9/8/2022 11:27 AM by Kory De La Paz.          CT Head Without Contrast   Final Result   No acute intracranial findings.       Small linear hypodensity in the left basal ganglia is favored to reflect   chronic lacunar infarct and correlate with clinical history.       This report was finalized on 9/8/2022 12:37 PM by Timbo Corona MD.            Vitals:    09/08/22 1230 09/08/22 1300 09/08/22 1330 09/08/22 1400   BP: 108/78 135/89 131/79 (!) 133/104   BP Location:       Patient Position:       Pulse: 74 84 86 72   Resp:       Temp:       TempSrc:       SpO2: 97% 93% 98% 98%   Weight:       Height:         Medications   sodium chloride 0.9 % flush 10 mL (has no administration in time range)   predniSONE (DELTASONE) tablet 60 mg (60 mg Oral Given  9/8/22 1837)     ECG/EMG Results (last 24 hours)     ** No results found for the last 24 hours. **        ECG 12 Lead   Final Result   Test Reason : WEAKNESS   Blood Pressure :   */*   mmHG   Vent. Rate :  60 BPM     Atrial Rate :  60 BPM      P-R Int : 128 ms          QRS Dur :  72 ms       QT Int : 400 ms       P-R-T Axes :  37 -23   2 degrees      QTc Int : 400 ms      ** Poor data quality, interpretation may be adversely affected   Normal sinus rhythm   Minimal voltage criteria for LVH, may be normal variant   Borderline ECG   When compared with ECG of 29-SEP-2010 13:57,   No significant change was found   Confirmed by MANI COURTNEY MD ([id number]) on 9/8/2022 12:16:03 PM      Referred By: RENO           Confirmed By: MANI COURTNEY MD                                          MDM  Number of Diagnoses or Management Options  Elevated sed rate  Headache around the eyes  Temporal arteritis (HCC)  Diagnosis management comments:       I have reviewed all available studies at the bedside with the patient and her daughter who is at the bedside.    Her head CT is reassuring just showing old stigmata from her previous stroke.  Her labs are bland save for her sedimentation rate which is 103.  In this setting without any other explanation this left-sided temporal headache I think temporal arteritis would be #1 in the differential.  She is no vision changes currently.  No blurring of vision and no eye pain.    At this point I think she needs to be started on 60 mg of prednisone for 2 weeks then taper per guidelines.  I have written her for first 2 weeks of therapy and I will have her follow-up with ophthalmology and her primary care to help ensure this.    I spoke with Dr. Denise, on-call general surgery and he is graciously agreed to see the patient in the office next week to schedule temporal artery biopsy.    I spoke to [facility] retina, ophthalmology's office they have graciously agreed to see the patient tomorrow to do a  complete eye exam and follow her along as it sounds like her previous eye exam was by an optometrist.    Long discussion at the bedside with the patient and her daughter regarding this.  She will need to be followed closely.  Also have her follow-up with her neurologist in [place] and she will return to the ER if worse in any way.    All are agreeable with the plan       Amount and/or Complexity of Data Reviewed  Clinical lab tests: reviewed  Tests in the radiology section of CPT®: reviewed  Tests in the medicine section of CPT®: reviewed  Decide to obtain previous medical records or to obtain history from someone other than the patient: yes        Final diagnoses:   Headache around the eyes   Elevated sed rate   Temporal arteritis (HCC)       ED Disposition  ED Disposition     ED Disposition   Discharge    Condition   Stable    Comment   --             Mitch Velázquez MD  [address]  [address]  [place] [id number]  [phone]      tomorrow at 10:30 am, bring a     Katrin Varghese, DO  [address]  [address]  [place] [id number] [phone]    Schedule an appointment as soon as possible for a visit       Ebenezer Milton MD  [address] [facility]  [address]  [place] [address]  [phone]    Schedule an appointment as soon as possible for a visit   call office tomorrow for follow up         Medication List      New Prescriptions    predniSONE 20 MG tablet  Commonly known as: DELTASONE  Take 3 tablets by mouth Daily for 14 days.           Where to Get Your Medications      These medications were sent to [address] - [address] [facility] [address] [facility] [address] - [phone]  - [phone] FX  [address] [facility] [address], [facility] [address]    Phone: [phone]   · predniSONE 20 MG tablet          David Crowell MD  09/08/22 4421

## 2022-09-08 NOTE — TELEPHONE ENCOUNTER
I don't think the patient has had time to get to the ED she just called at 9:00am.     Do you still want me to call her?

## 2022-09-08 NOTE — TELEPHONE ENCOUNTER
Can you call the patient, I do not see any ED visit notes through [facility]. See if she did go to the ED somewhere. Also, get her December appointment moved up with Janelle as soon as possible. Thanks.

## 2022-09-27 ENCOUNTER — LAB (OUTPATIENT)
Dept: LAB | Facility: HOSPITAL | Age: 72
End: 2022-09-27

## 2022-09-27 ENCOUNTER — TRANSCRIBE ORDERS (OUTPATIENT)
Dept: LAB | Facility: HOSPITAL | Age: 72
End: 2022-09-27

## 2022-09-27 DIAGNOSIS — Z01.812 PRE-OPERATIVE LABORATORY EXAMINATION: Primary | ICD-10-CM

## 2022-09-27 DIAGNOSIS — Z01.812 PRE-OPERATIVE LABORATORY EXAMINATION: ICD-10-CM

## 2022-09-27 LAB
ALBUMIN SERPL-MCNC: 3.9 G/DL (ref 3.5–5.2)
ALBUMIN/GLOB SERPL: 2.1 G/DL
ALP SERPL-CCNC: 73 U/L (ref 39–117)
ALT SERPL W P-5'-P-CCNC: 37 U/L (ref 1–33)
ANION GAP SERPL CALCULATED.3IONS-SCNC: 14.9 MMOL/L (ref 5–15)
AST SERPL-CCNC: 25 U/L (ref 1–32)
BILIRUB SERPL-MCNC: 0.2 MG/DL (ref 0–1.2)
BUN SERPL-MCNC: 25 MG/DL (ref 8–23)
BUN/CREAT SERPL: 20.3 (ref 7–25)
CALCIUM SPEC-SCNC: 8.7 MG/DL (ref 8.6–10.5)
CHLORIDE SERPL-SCNC: 102 MMOL/L (ref 98–107)
CO2 SERPL-SCNC: 20.1 MMOL/L (ref 22–29)
CREAT SERPL-MCNC: 1.23 MG/DL (ref 0.57–1)
DEPRECATED RDW RBC AUTO: 44.4 FL (ref 37–54)
EGFRCR SERPLBLD CKD-EPI 2021: 47.1 ML/MIN/1.73
ERYTHROCYTE [DISTWIDTH] IN BLOOD BY AUTOMATED COUNT: 14.3 % (ref 12.3–15.4)
GLOBULIN UR ELPH-MCNC: 1.9 GM/DL
GLUCOSE SERPL-MCNC: 162 MG/DL (ref 65–99)
HCT VFR BLD AUTO: 31.5 % (ref 34–46.6)
HGB BLD-MCNC: 10.4 G/DL (ref 12–15.9)
MCH RBC QN AUTO: 28.6 PG (ref 26.6–33)
MCHC RBC AUTO-ENTMCNC: 33 G/DL (ref 31.5–35.7)
MCV RBC AUTO: 86.5 FL (ref 79–97)
PLATELET # BLD AUTO: 312 10*3/MM3 (ref 140–450)
PMV BLD AUTO: 9.4 FL (ref 6–12)
POTASSIUM SERPL-SCNC: 4.1 MMOL/L (ref 3.5–5.2)
PROT SERPL-MCNC: 5.8 G/DL (ref 6–8.5)
RBC # BLD AUTO: 3.64 10*6/MM3 (ref 3.77–5.28)
SODIUM SERPL-SCNC: 137 MMOL/L (ref 136–145)
WBC NRBC COR # BLD: 10.8 10*3/MM3 (ref 3.4–10.8)

## 2022-09-27 PROCEDURE — 80053 COMPREHEN METABOLIC PANEL: CPT

## 2022-09-27 PROCEDURE — 85027 COMPLETE CBC AUTOMATED: CPT

## 2022-09-27 PROCEDURE — 36415 COLL VENOUS BLD VENIPUNCTURE: CPT

## 2022-09-29 ENCOUNTER — LAB REQUISITION (OUTPATIENT)
Dept: LAB | Facility: HOSPITAL | Age: 72
End: 2022-09-29

## 2022-09-29 DIAGNOSIS — M31.6 OTHER GIANT CELL ARTERITIS: ICD-10-CM

## 2022-09-29 PROCEDURE — 88305 TISSUE EXAM BY PATHOLOGIST: CPT | Performed by: SURGERY

## 2022-09-30 LAB
CYTO UR: NORMAL
LAB AP CASE REPORT: NORMAL
LAB AP CLINICAL INFORMATION: NORMAL
PATH REPORT.FINAL DX SPEC: NORMAL
PATH REPORT.GROSS SPEC: NORMAL

## 2022-10-24 ENCOUNTER — HOSPITAL ENCOUNTER (EMERGENCY)
Facility: HOSPITAL | Age: 72
Discharge: HOME | End: 2022-10-24
Attending: EMERGENCY MEDICINE | Admitting: EMERGENCY MEDICINE

## 2022-10-24 VITALS
TEMPERATURE: 98.2 F | BODY MASS INDEX: 25.8 KG/M2 | SYSTOLIC BLOOD PRESSURE: 127 MMHG | OXYGEN SATURATION: 97 % | HEART RATE: 86 BPM | HEIGHT: 59 IN | RESPIRATION RATE: 18 BRPM | WEIGHT: 128 LBS | DIASTOLIC BLOOD PRESSURE: 95 MMHG

## 2022-10-24 DIAGNOSIS — T38.0X5A STEROID-INDUCED HYPERGLYCEMIA: Primary | ICD-10-CM

## 2022-10-24 DIAGNOSIS — R73.9 STEROID-INDUCED HYPERGLYCEMIA: Primary | ICD-10-CM

## 2022-10-24 DIAGNOSIS — B37.81 ESOPHAGEAL THRUSH: ICD-10-CM

## 2022-10-24 LAB
ANION GAP SERPL CALCULATED.3IONS-SCNC: 14 MMOL/L (ref 5–15)
BASOPHILS # BLD AUTO: 0.03 10*3/MM3 (ref 0–0.2)
BASOPHILS NFR BLD AUTO: 0.3 % (ref 0–1.5)
BUN BLDA-MCNC: 24 MG/DL (ref 8–26)
BUN SERPL-MCNC: 18 MG/DL (ref 8–23)
BUN/CREAT SERPL: 12.5 (ref 7–25)
CA-I BLDA-SCNC: 1.16 MMOL/L (ref 1.2–1.32)
CALCIUM SPEC-SCNC: 9 MG/DL (ref 8.6–10.5)
CHLORIDE BLDA-SCNC: 94 MMOL/L (ref 98–109)
CHLORIDE SERPL-SCNC: 98 MMOL/L (ref 98–107)
CO2 BLDA-SCNC: 29 MMOL/L (ref 24–29)
CO2 SERPL-SCNC: 22 MMOL/L (ref 22–29)
CREAT BLDA-MCNC: 1.4 MG/DL (ref 0.6–1.3)
CREAT SERPL-MCNC: 1.44 MG/DL (ref 0.57–1)
DEPRECATED RDW RBC AUTO: 51.7 FL (ref 37–54)
EGFRCR SERPLBLD CKD-EPI 2021: 39 ML/MIN/1.73
EGFRCR SERPLBLD CKD-EPI 2021: 40.3 ML/MIN/1.73
EOSINOPHIL # BLD AUTO: 0.02 10*3/MM3 (ref 0–0.4)
EOSINOPHIL NFR BLD AUTO: 0.2 % (ref 0.3–6.2)
ERYTHROCYTE [DISTWIDTH] IN BLOOD BY AUTOMATED COUNT: 15.9 % (ref 12.3–15.4)
GLUCOSE BLDC GLUCOMTR-MCNC: 336 MG/DL (ref 70–130)
GLUCOSE BLDC GLUCOMTR-MCNC: 390 MG/DL (ref 70–130)
GLUCOSE BLDC GLUCOMTR-MCNC: >599 MG/DL (ref 70–130)
GLUCOSE SERPL-MCNC: 425 MG/DL (ref 65–99)
HBA1C MFR BLD: 13.6 % (ref 4.8–5.6)
HCT VFR BLD AUTO: 34.7 % (ref 34–46.6)
HCT VFR BLDA CALC: 34 % (ref 38–51)
HGB BLD-MCNC: 11.3 G/DL (ref 12–15.9)
HGB BLDA-MCNC: 11.6 G/DL (ref 12–17)
IMM GRANULOCYTES # BLD AUTO: 0.13 10*3/MM3 (ref 0–0.05)
IMM GRANULOCYTES NFR BLD AUTO: 1.2 % (ref 0–0.5)
LYMPHOCYTES # BLD AUTO: 2.45 10*3/MM3 (ref 0.7–3.1)
LYMPHOCYTES NFR BLD AUTO: 22.8 % (ref 19.6–45.3)
MCH RBC QN AUTO: 28.8 PG (ref 26.6–33)
MCHC RBC AUTO-ENTMCNC: 32.6 G/DL (ref 31.5–35.7)
MCV RBC AUTO: 88.5 FL (ref 79–97)
MONOCYTES # BLD AUTO: 0.26 10*3/MM3 (ref 0.1–0.9)
MONOCYTES NFR BLD AUTO: 2.4 % (ref 5–12)
NEUTROPHILS NFR BLD AUTO: 7.86 10*3/MM3 (ref 1.7–7)
NEUTROPHILS NFR BLD AUTO: 73.1 % (ref 42.7–76)
NRBC BLD AUTO-RTO: 0 /100 WBC (ref 0–0.2)
PLATELET # BLD AUTO: 429 10*3/MM3 (ref 140–450)
PMV BLD AUTO: 9.3 FL (ref 6–12)
POTASSIUM BLDA-SCNC: 4.4 MMOL/L (ref 3.5–4.9)
POTASSIUM SERPL-SCNC: 3.2 MMOL/L (ref 3.5–5.2)
RBC # BLD AUTO: 3.92 10*6/MM3 (ref 3.77–5.28)
SODIUM BLD-SCNC: 131 MMOL/L (ref 138–146)
SODIUM SERPL-SCNC: 134 MMOL/L (ref 136–145)
WBC NRBC COR # BLD: 10.75 10*3/MM3 (ref 3.4–10.8)

## 2022-10-24 PROCEDURE — G0108 DIAB MANAGE TRN  PER INDIV: HCPCS

## 2022-10-24 PROCEDURE — 80048 BASIC METABOLIC PNL TOTAL CA: CPT | Performed by: EMERGENCY MEDICINE

## 2022-10-24 PROCEDURE — 80047 BASIC METABLC PNL IONIZED CA: CPT

## 2022-10-24 PROCEDURE — 99284 EMERGENCY DEPT VISIT MOD MDM: CPT

## 2022-10-24 PROCEDURE — 83036 HEMOGLOBIN GLYCOSYLATED A1C: CPT | Performed by: EMERGENCY MEDICINE

## 2022-10-24 PROCEDURE — 85014 HEMATOCRIT: CPT

## 2022-10-24 PROCEDURE — 63710000001 INSULIN REGULAR HUMAN PER 5 UNITS: Performed by: EMERGENCY MEDICINE

## 2022-10-24 PROCEDURE — 85025 COMPLETE CBC W/AUTO DIFF WBC: CPT | Performed by: EMERGENCY MEDICINE

## 2022-10-24 PROCEDURE — 82962 GLUCOSE BLOOD TEST: CPT

## 2022-10-24 PROCEDURE — 36415 COLL VENOUS BLD VENIPUNCTURE: CPT

## 2022-10-24 RX ORDER — LIDOCAINE HYDROCHLORIDE 20 MG/ML
15 SOLUTION OROPHARYNGEAL ONCE
Status: COMPLETED | OUTPATIENT
Start: 2022-10-24 | End: 2022-10-24

## 2022-10-24 RX ORDER — FLUCONAZOLE 200 MG/1
TABLET ORAL
Qty: 22 TABLET | Refills: 0 | Status: SHIPPED | OUTPATIENT
Start: 2022-10-24

## 2022-10-24 RX ORDER — ALUMINA, MAGNESIA, AND SIMETHICONE 2400; 2400; 240 MG/30ML; MG/30ML; MG/30ML
15 SUSPENSION ORAL ONCE
Status: COMPLETED | OUTPATIENT
Start: 2022-10-24 | End: 2022-10-24

## 2022-10-24 RX ADMIN — INSULIN HUMAN 8 UNITS: 100 INJECTION, SOLUTION PARENTERAL at 11:22

## 2022-10-24 RX ADMIN — SODIUM CHLORIDE, POTASSIUM CHLORIDE, SODIUM LACTATE AND CALCIUM CHLORIDE 1000 ML: 600; 310; 30; 20 INJECTION, SOLUTION INTRAVENOUS at 11:23

## 2022-10-24 RX ADMIN — SODIUM CHLORIDE, POTASSIUM CHLORIDE, SODIUM LACTATE AND CALCIUM CHLORIDE 500 ML: 600; 310; 30; 20 INJECTION, SOLUTION INTRAVENOUS at 13:09

## 2022-10-24 RX ADMIN — LIDOCAINE HYDROCHLORIDE 15 ML: 20 SOLUTION ORAL; TOPICAL at 11:06

## 2022-10-24 RX ADMIN — ALUMINUM HYDROXIDE, MAGNESIUM HYDROXIDE, AND DIMETHICONE 15 ML: 400; 400; 40 SUSPENSION ORAL at 11:05

## 2022-10-24 RX ADMIN — INSULIN HUMAN 8 UNITS: 100 INJECTION, SOLUTION PARENTERAL at 14:58

## 2022-10-24 RX ADMIN — INSULIN HUMAN 6 UNITS: 100 INJECTION, SOLUTION PARENTERAL at 13:08

## 2022-10-24 NOTE — CASE MANAGEMENT/SOCIAL WORK
Continued Stay Note  [facility]     Patient Name: Deborah Jones  MRN: [id number]  Today's Date: 10/24/2022    Admit Date: 10/24/2022    Plan:  follow up   Discharge Plan     Row Name 10/24/22 1341       Plan    Plan  follow up    Plan Comments MD came to MSW regarding Diabetes Education/Insulin Training for pt. MSW called the inpatient Diabetes Education office and left a message. MSW consulted with  Manager and she states that a Diabetes education consult may get a quicker response. ANGELA sent MD a message in Epic. MSW received a call back from Diabetes educator Lio and he reports that he has a meeting but will be able to come to the ED to see pt. around 3:15 after his meeting. MSW updated MD. MSW is available.    Final Discharge Disposition Code 30 - still a patient               Discharge Codes    No documentation.                     ANGELA Dodd

## 2022-10-24 NOTE — ED PROVIDER NOTES
Subjective   History of Present Illness    Pt presents with odynophagia.  Onset a few days ago, getting worse.  Pain is burning sensation in upper chest/lower throat and is worse with swallowing.  No focal weakness or speech problem.    In early September she was dx possible GCA and started on high dose steroids.  She recently saw surgeon and had temporal artery biopsy which was negative. She is in the process of tapering the prednisone.  She has DM and takes metformin and glipizide for control.    No fever, dyspnea, cough.    History provided by:  Patient      Review of Systems   Constitutional: Negative for fever.   HENT: Positive for trouble swallowing.    Respiratory: Negative for cough and shortness of breath.    Cardiovascular: Positive for chest pain.   Gastrointestinal: Negative for abdominal pain and vomiting.   All other systems reviewed and are negative.      Past Medical History:   Diagnosis Date   • Impaired functional mobility, balance, gait, and endurance    • Stroke (cerebrum) (HCC)        No Known Allergies    No past surgical history on file.    No family history on file.    Social History     Socioeconomic History   • Marital status:    Tobacco Use   • Smoking status: Never   • Smokeless tobacco: Never   Substance and Sexual Activity   • Alcohol use: Yes     Alcohol/week: 1.0 standard drink     Types: 1 Glasses of wine per week     Comment: occas.           Objective   Physical Exam  Vitals and nursing note reviewed.   Constitutional:       General: She is not in acute distress.     Appearance: Normal appearance. She is not ill-appearing.   HENT:      Head: Normocephalic and atraumatic.      Mouth/Throat:      Mouth: Mucous membranes are moist.      Comments: A few small patchy white lesions that scrape off easily on the posterior pharynx.  Oral cavity otherwise clear.  Eyes:      General: No scleral icterus.        Right eye: No discharge.         Left eye: No discharge.       Conjunctiva/sclera: Conjunctivae normal.   Cardiovascular:      Rate and Rhythm: Normal rate and regular rhythm.      Heart sounds: No murmur heard.  Pulmonary:      Effort: Pulmonary effort is normal. No respiratory distress.      Breath sounds: Normal breath sounds. No wheezing.   Abdominal:      General: Bowel sounds are normal. There is no distension.      Palpations: Abdomen is soft.      Tenderness: There is no abdominal tenderness. There is no guarding or rebound.   Musculoskeletal:         General: No swelling. Normal range of motion.      Cervical back: Normal range of motion and neck supple.   Skin:     General: Skin is warm and dry.      Findings: No rash.   Neurological:      General: No focal deficit present.      Mental Status: She is alert. Mental status is at baseline.      Comments: Speech fluent and articulate.  No facial droop.  5/5 strength in arms and legs.  Normal .  Mentation normal.   Psychiatric:         Mood and Affect: Mood normal.         Behavior: Behavior normal.         Thought Content: Thought content normal.         Procedures           ED Course         Suspect she has thrush from long term high dose steroids.  Checked sugar, it was 620.  Insulin and fluids ordered.  Pt counseled, she will need to taper the prednisone so this problem isn't going away.  I am going to start her on some Lantus which she can take until she's off the prednisone and she can talk to PCP about tapering a little more quickly.  I am having the diabetes educator come do insulin training for her.    Pt given a GI cocktail with relief of discomfort.     Sugar improving with insulin and fluids.       Patient better on serial rechecks.  Discussed findings, concerns, plan of care, expected course, reasons to return and followup.  Provided the opportunity to ask questions.                               MDM  Number of Diagnoses or Management Options     Amount and/or Complexity of Data Reviewed  Clinical lab  tests: reviewed and ordered        Final diagnoses:   Steroid-induced hyperglycemia   Esophageal thrush (HCC)       ED Disposition  ED Disposition     ED Disposition   Discharge    Condition   Stable    Comment   --             Katrin Varghese DO  [address] [place] [id number] [phone]    Schedule an appointment as soon as possible for a visit            Medication List      New Prescriptions    fluconazole 200 MG tablet  Commonly known as: DIFLUCAN  2 PO day one then 1 PO daily. Total course 21 days     Insulin Glargine 100 UNIT/ML injection pen  Commonly known as: LANTUS SOLOSTAR  Inject 20 Units under the skin into the appropriate area as directed Every Night.           Where to Get Your Medications      These medications were sent to [facility] [id number] - [place], [place] - [address] [facility] [address] [facility] - [phone]  - [phone] FX  [address] [place] [address]    Phone: [phone]   · fluconazole 200 MG tablet  · Insulin Glargine 100 UNIT/ML injection pen          Luicen Butler MD  10/24/22 8654

## 2022-10-24 NOTE — CONSULTS
"Diabetes Education  Assessment/Teaching    Patient Name:  Deborah Jones  YOB: 1950  MRN: [id number]  Admit Date:  10/24/2022      Assessment Date:  10/24/2022  Flowsheet Row Most Recent Value   General Information     Referral From: A1c   Height 149.9 cm (59.02\")   Height Method Stated   Weight 58.1 kg (128 lb)   Weight Method Stated   Pregnancy Assessment    Diabetes History    What type of diabetes do you have? Type 2   Length of Diabetes Diagnosis 10 + years   Current DM knowledge fair   Have you had diabetes education/teaching in the past? yes   When and where was your diabetes education? per md office   Do you test your blood sugar at home? yes   Frequency of checks \" not often enought\" susanne 3 times a week   Who performs the test? self   Typical readings \"recetnly high 300 +\"   Have you had low blood sugar? (<70mg/dl) yes   How often do you have low blood sugar? rare   Have you had high blood sugar? (>140mg/dl) yes   How often do you have high blood sugar? frequently   How would you rate your diabetes control? fair   How often do you check your feet? weekly   Education Preferences    What areas of diabetes would you like to learn about? avoiding high blood sugar, avoiding low blood sugar, diabetes complications, diet information, medications for diabetes, understanding diabetes   Nutrition Information    Assessment Topics    Healthy Eating - Assessment Needs education   Being Active - Assessment Needs education   Taking Medication - Assessment Needs education   Problem Solving - Assessment Needs education   Reducing Risk - Assessment Needs education   Healthy Coping - Assessment Needs education   Monitoring - Assessment Needs education   DM Goals           Flowsheet Row Most Recent Value   DM Education Needs    Meter Has own   Frequency of Testing AC/HS   Blood Glucose Target Range educated on glucose ranges fasting and 2 hours post prandial   Medication Oral  [potential home on insulin per " "MSW and RN]   Problem Solving Hypoglycemia, Hyperglycemia, Signs, Symptoms, Treatment   Reducing Risks A1C testing   Physical Activity Frequency Rarely   Discharge Plan Home   Motivation Engaged   Teaching Method Explanation, Demonstration, Handouts, Teach back        Patient gives permission to discuss diabetes related health topics with herself and her daughter who is at bedside. Discussed and taught patient and daughter about type 2 diabetes self-management, risk factors, and importance of blood glucose control to reduce complications. Target blood glucose readings and A1c goals per ADA were reviewed. Reviewed with patient current A1c 13.6 and discussed its significance. Signs, symptoms, and treatment of hyperglycemia and hypoglycemia were discussed. Lifestyle changes such as physical activity with MD approval and healthy eating were encouraged. Stressed the importance of strict blood sugar control after surgery to prevent complications such as infection and to promote healing of incision. Encouraged pt to monitor blood sugar at home 3+  times per day , or as recommended by MD, and to call PCP if blood sugar is trending deja. Encouraged to keep record of blood glucose readings to take to follow up appointment with PCP. Provided patient with copy of [employer] ([employer]s \"What is Diabetes\" handout, \"Blood Glucose Goals\" handout, and \"What is A1c\" handout. Patient reports she often eats breakfast and dinner, no snacking during the day. Educated on how long periods of time without eating can make glucose difficult control. Advised on a consitent carb eating pattern with snacking to aid in better lgucose control . Together we went over the plate method as a way to build a healthy diet. We brainstormed how to use the plate method during the day with foods she may enjoy. Advised the patient on \"diabetesfoodhub.org\" as a recource to broaden food selection in line with a diabetic diet. Patient has meter at home, states she " is comfortable using and denies need for meter teach. Patient endorses she may only check glucose a few times a week. Advised on seeking an rx for a CGM to aid in monitoring compliance and detection of abnormal readings.   At this time MSW and RN advise that MD desires insulin teach education. No orders as to desired regiment so general education provided. Advised primary RN to call should follow up education be needed on insulin.     Discussed and taught patient about current hospital insulins including the onset, peak, and duration of the insulins. Taught patient the correct sites for insulin injections and the importance of rotating insulin injection site. Taught patient the correct storage for opened insulin at room temperature, storage for unopened insulin in the refrigerator, and expiration dates for insulin. Also, discussed and taught patient the correct disposable of sharps.  Demonstrated and taught patient how to use demo pen with appropriate injection technique on the demo pillow. Patient was able to return demonstration without difficulty. Discussed and taught patient the s/s of hypoglycemia and treatment measures for hypoglycemia. Patient was encouraged to follow up with their primary caregiver and/or local pharmacist for any questions or concerns.  Patient denies need for insulin teach with vial and syringine at this time. Handouts provided on how to properly give insulin with pen and vial.syrignine.   Patient currently on steroids. Discussed how steroids can elevate glucose use. Advised caution when tapering off steroids and using insulin as patient may be prone to rapid changes in glucose readings. Together we reviewed the signs and symptoms of high and low blood sugars and how to correct them. In regards to low blood sugars reviewed the rule of 15 as the preferred treatment method. Patient shows understanding through teach back. Patietn and daughter feel confident in adding insulin to their care  regiment should it be prescribed upon d/c. Should patient have questions, advised to contact PCP or to contact the hospital and ask for diabetes education and we can guide if able. Thank you for this referral, please re consult as needed.       Electronically signed by:  Liu Freitas RN [facility]  10/24/22 15:47 EDT

## 2024-03-05 ENCOUNTER — HOSPITAL ENCOUNTER (EMERGENCY)
Facility: HOSPITAL | Age: 74
Discharge: HOME | End: 2024-03-05
Attending: STUDENT IN AN ORGANIZED HEALTH CARE EDUCATION/TRAINING PROGRAM
Payer: MEDICARE

## 2024-03-05 ENCOUNTER — APPOINTMENT (OUTPATIENT)
Dept: CT IMAGING | Facility: HOSPITAL | Age: 74
End: 2024-03-05
Payer: MEDICARE

## 2024-03-05 ENCOUNTER — APPOINTMENT (OUTPATIENT)
Dept: MRI IMAGING | Facility: HOSPITAL | Age: 74
End: 2024-03-05
Payer: MEDICARE

## 2024-03-05 ENCOUNTER — APPOINTMENT (OUTPATIENT)
Dept: GENERAL RADIOLOGY | Facility: HOSPITAL | Age: 74
End: 2024-03-05
Payer: MEDICARE

## 2024-03-05 VITALS
OXYGEN SATURATION: 96 % | WEIGHT: 128 LBS | HEIGHT: 60 IN | BODY MASS INDEX: 25.13 KG/M2 | RESPIRATION RATE: 18 BRPM | HEART RATE: 80 BPM | DIASTOLIC BLOOD PRESSURE: 80 MMHG | TEMPERATURE: 97.9 F | SYSTOLIC BLOOD PRESSURE: 135 MMHG

## 2024-03-05 DIAGNOSIS — R51.9 ACUTE NONINTRACTABLE HEADACHE, UNSPECIFIED HEADACHE TYPE: Primary | ICD-10-CM

## 2024-03-05 DIAGNOSIS — R20.0 NUMBNESS: ICD-10-CM

## 2024-03-05 DIAGNOSIS — R05.1 ACUTE COUGH: ICD-10-CM

## 2024-03-05 DIAGNOSIS — Z86.73 HISTORY OF STROKE: ICD-10-CM

## 2024-03-05 DIAGNOSIS — N39.0 ACUTE UTI (URINARY TRACT INFECTION): ICD-10-CM

## 2024-03-05 LAB
ALBUMIN SERPL-MCNC: 4.8 G/DL (ref 3.5–5.2)
ALBUMIN/GLOB SERPL: 1.5 G/DL
ALP SERPL-CCNC: 118 U/L (ref 39–117)
ALT SERPL W P-5'-P-CCNC: 21 U/L (ref 1–33)
ANION GAP SERPL CALCULATED.3IONS-SCNC: 10 MMOL/L (ref 5–15)
AST SERPL-CCNC: 26 U/L (ref 1–32)
BACTERIA UR QL AUTO: ABNORMAL /HPF
BASOPHILS # BLD AUTO: 0.02 10*3/MM3 (ref 0–0.2)
BASOPHILS NFR BLD AUTO: 0.3 % (ref 0–1.5)
BILIRUB SERPL-MCNC: 0.2 MG/DL (ref 0–1.2)
BILIRUB UR QL STRIP: NEGATIVE
BUN BLDA-MCNC: 25 MG/DL (ref 8–26)
BUN SERPL-MCNC: 26 MG/DL (ref 8–23)
BUN/CREAT SERPL: 22 (ref 7–25)
CA-I BLDA-SCNC: 1.23 MMOL/L (ref 1.2–1.32)
CALCIUM SPEC-SCNC: 9.7 MG/DL (ref 8.6–10.5)
CHLORIDE BLDA-SCNC: 106 MMOL/L (ref 98–109)
CHLORIDE SERPL-SCNC: 104 MMOL/L (ref 98–107)
CLARITY UR: CLEAR
CO2 BLDA-SCNC: 26 MMOL/L (ref 24–29)
CO2 SERPL-SCNC: 25 MMOL/L (ref 22–29)
COLOR UR: YELLOW
CREAT BLDA-MCNC: 1.2 MG/DL (ref 0.6–1.3)
CREAT SERPL-MCNC: 1.18 MG/DL (ref 0.57–1)
DEPRECATED RDW RBC AUTO: 43.4 FL (ref 37–54)
EGFRCR SERPLBLD CKD-EPI 2021: 48.2 ML/MIN/1.73
EGFRCR SERPLBLD CKD-EPI 2021: 49.2 ML/MIN/1.73
EOSINOPHIL # BLD AUTO: 0.28 10*3/MM3 (ref 0–0.4)
EOSINOPHIL NFR BLD AUTO: 4.5 % (ref 0.3–6.2)
ERYTHROCYTE [DISTWIDTH] IN BLOOD BY AUTOMATED COUNT: 12.9 % (ref 12.3–15.4)
ERYTHROCYTE [SEDIMENTATION RATE] IN BLOOD: 45 MM/HR (ref 0–30)
GLOBULIN UR ELPH-MCNC: 3.2 GM/DL
GLUCOSE BLDC GLUCOMTR-MCNC: 145 MG/DL (ref 70–130)
GLUCOSE SERPL-MCNC: 153 MG/DL (ref 65–99)
GLUCOSE UR STRIP-MCNC: NEGATIVE MG/DL
HCT VFR BLD AUTO: 39.6 % (ref 34–46.6)
HCT VFR BLDA CALC: 40 % (ref 38–51)
HGB BLD-MCNC: 12.8 G/DL (ref 12–15.9)
HGB BLDA-MCNC: 13.6 G/DL (ref 12–17)
HGB UR QL STRIP.AUTO: NEGATIVE
HOLD SPECIMEN: NORMAL
HYALINE CASTS UR QL AUTO: ABNORMAL /LPF
IMM GRANULOCYTES # BLD AUTO: 0.02 10*3/MM3 (ref 0–0.05)
IMM GRANULOCYTES NFR BLD AUTO: 0.3 % (ref 0–0.5)
INR PPP: 1.1 (ref 0.8–1.2)
KETONES UR QL STRIP: NEGATIVE
LEUKOCYTE ESTERASE UR QL STRIP.AUTO: ABNORMAL
LYMPHOCYTES # BLD AUTO: 2.21 10*3/MM3 (ref 0.7–3.1)
LYMPHOCYTES NFR BLD AUTO: 35.7 % (ref 19.6–45.3)
MAGNESIUM SERPL-MCNC: 1.8 MG/DL (ref 1.6–2.4)
MCH RBC QN AUTO: 29.6 PG (ref 26.6–33)
MCHC RBC AUTO-ENTMCNC: 32.3 G/DL (ref 31.5–35.7)
MCV RBC AUTO: 91.7 FL (ref 79–97)
MONOCYTES # BLD AUTO: 0.29 10*3/MM3 (ref 0.1–0.9)
MONOCYTES NFR BLD AUTO: 4.7 % (ref 5–12)
NEUTROPHILS NFR BLD AUTO: 3.37 10*3/MM3 (ref 1.7–7)
NEUTROPHILS NFR BLD AUTO: 54.5 % (ref 42.7–76)
NITRITE UR QL STRIP: NEGATIVE
NRBC BLD AUTO-RTO: 0 /100 WBC (ref 0–0.2)
PH UR STRIP.AUTO: 5.5 [PH] (ref 5–8)
PLATELET # BLD AUTO: 343 10*3/MM3 (ref 140–450)
PMV BLD AUTO: 8.8 FL (ref 6–12)
POTASSIUM BLDA-SCNC: 4.2 MMOL/L (ref 3.5–4.9)
POTASSIUM SERPL-SCNC: 4.1 MMOL/L (ref 3.5–5.2)
PROT SERPL-MCNC: 8 G/DL (ref 6–8.5)
PROT UR QL STRIP: NEGATIVE
PROTHROMBIN TIME: 12.9 SECONDS (ref 12.8–15.2)
QT INTERVAL: 392 MS
QTC INTERVAL: 420 MS
RBC # BLD AUTO: 4.32 10*6/MM3 (ref 3.77–5.28)
RBC # UR STRIP: ABNORMAL /HPF
REF LAB TEST METHOD: ABNORMAL
SODIUM BLD-SCNC: 142 MMOL/L (ref 138–146)
SODIUM SERPL-SCNC: 139 MMOL/L (ref 136–145)
SP GR UR STRIP: 1.05 (ref 1–1.03)
SQUAMOUS #/AREA URNS HPF: ABNORMAL /HPF
TROPONIN T SERPL HS-MCNC: 11 NG/L
UROBILINOGEN UR QL STRIP: ABNORMAL
WBC # UR STRIP: ABNORMAL /HPF
WBC NRBC COR # BLD AUTO: 6.19 10*3/MM3 (ref 3.4–10.8)
WHOLE BLOOD HOLD COAG: NORMAL
WHOLE BLOOD HOLD SPECIMEN: NORMAL

## 2024-03-05 PROCEDURE — 70496 CT ANGIOGRAPHY HEAD: CPT

## 2024-03-05 PROCEDURE — 85610 PROTHROMBIN TIME: CPT

## 2024-03-05 PROCEDURE — 85652 RBC SED RATE AUTOMATED: CPT | Performed by: STUDENT IN AN ORGANIZED HEALTH CARE EDUCATION/TRAINING PROGRAM

## 2024-03-05 PROCEDURE — 85025 COMPLETE CBC W/AUTO DIFF WBC: CPT | Performed by: STUDENT IN AN ORGANIZED HEALTH CARE EDUCATION/TRAINING PROGRAM

## 2024-03-05 PROCEDURE — 83735 ASSAY OF MAGNESIUM: CPT | Performed by: STUDENT IN AN ORGANIZED HEALTH CARE EDUCATION/TRAINING PROGRAM

## 2024-03-05 PROCEDURE — 70450 CT HEAD/BRAIN W/O DYE: CPT

## 2024-03-05 PROCEDURE — 80053 COMPREHEN METABOLIC PANEL: CPT | Performed by: STUDENT IN AN ORGANIZED HEALTH CARE EDUCATION/TRAINING PROGRAM

## 2024-03-05 PROCEDURE — 96375 TX/PRO/DX INJ NEW DRUG ADDON: CPT

## 2024-03-05 PROCEDURE — 71045 X-RAY EXAM CHEST 1 VIEW: CPT

## 2024-03-05 PROCEDURE — 25010000002 KETOROLAC TROMETHAMINE PER 15 MG: Performed by: STUDENT IN AN ORGANIZED HEALTH CARE EDUCATION/TRAINING PROGRAM

## 2024-03-05 PROCEDURE — 25010000002 DIPHENHYDRAMINE PER 50 MG: Performed by: STUDENT IN AN ORGANIZED HEALTH CARE EDUCATION/TRAINING PROGRAM

## 2024-03-05 PROCEDURE — 84484 ASSAY OF TROPONIN QUANT: CPT | Performed by: STUDENT IN AN ORGANIZED HEALTH CARE EDUCATION/TRAINING PROGRAM

## 2024-03-05 PROCEDURE — 70498 CT ANGIOGRAPHY NECK: CPT

## 2024-03-05 PROCEDURE — 96361 HYDRATE IV INFUSION ADD-ON: CPT

## 2024-03-05 PROCEDURE — 70551 MRI BRAIN STEM W/O DYE: CPT

## 2024-03-05 PROCEDURE — 0042T HC CT CEREBRAL PERFUSION W/WO CONTRAST: CPT

## 2024-03-05 PROCEDURE — 93005 ELECTROCARDIOGRAM TRACING: CPT | Performed by: STUDENT IN AN ORGANIZED HEALTH CARE EDUCATION/TRAINING PROGRAM

## 2024-03-05 PROCEDURE — 25010000002 PROCHLORPERAZINE 10 MG/2ML SOLUTION: Performed by: STUDENT IN AN ORGANIZED HEALTH CARE EDUCATION/TRAINING PROGRAM

## 2024-03-05 PROCEDURE — 99285 EMERGENCY DEPT VISIT HI MDM: CPT

## 2024-03-05 PROCEDURE — 25810000003 LACTATED RINGERS SOLUTION: Performed by: STUDENT IN AN ORGANIZED HEALTH CARE EDUCATION/TRAINING PROGRAM

## 2024-03-05 PROCEDURE — 81001 URINALYSIS AUTO W/SCOPE: CPT | Performed by: STUDENT IN AN ORGANIZED HEALTH CARE EDUCATION/TRAINING PROGRAM

## 2024-03-05 PROCEDURE — 80047 BASIC METABLC PNL IONIZED CA: CPT

## 2024-03-05 PROCEDURE — 25510000001 IOPAMIDOL PER 1 ML: Performed by: STUDENT IN AN ORGANIZED HEALTH CARE EDUCATION/TRAINING PROGRAM

## 2024-03-05 PROCEDURE — 96374 THER/PROPH/DIAG INJ IV PUSH: CPT

## 2024-03-05 PROCEDURE — 85014 HEMATOCRIT: CPT

## 2024-03-05 RX ORDER — SODIUM CHLORIDE 0.9 % (FLUSH) 0.9 %
10 SYRINGE (ML) INJECTION AS NEEDED
Status: DISCONTINUED | OUTPATIENT
Start: 2024-03-05 | End: 2024-03-05 | Stop reason: HOSPADM

## 2024-03-05 RX ORDER — DIPHENHYDRAMINE HYDROCHLORIDE 50 MG/ML
25 INJECTION INTRAMUSCULAR; INTRAVENOUS ONCE
Status: COMPLETED | OUTPATIENT
Start: 2024-03-05 | End: 2024-03-05

## 2024-03-05 RX ORDER — KETOROLAC TROMETHAMINE 15 MG/ML
15 INJECTION, SOLUTION INTRAMUSCULAR; INTRAVENOUS ONCE
Status: COMPLETED | OUTPATIENT
Start: 2024-03-05 | End: 2024-03-05

## 2024-03-05 RX ORDER — PROCHLORPERAZINE EDISYLATE 5 MG/ML
5 INJECTION INTRAMUSCULAR; INTRAVENOUS ONCE
Status: COMPLETED | OUTPATIENT
Start: 2024-03-05 | End: 2024-03-05

## 2024-03-05 RX ORDER — CEFDINIR 300 MG/1
300 CAPSULE ORAL 2 TIMES DAILY
Qty: 14 CAPSULE | Refills: 0 | Status: SHIPPED | OUTPATIENT
Start: 2024-03-05 | End: 2024-03-12

## 2024-03-05 RX ORDER — BENZONATATE 100 MG/1
100 CAPSULE ORAL 3 TIMES DAILY PRN
Qty: 20 CAPSULE | Refills: 0 | Status: SHIPPED | OUTPATIENT
Start: 2024-03-05

## 2024-03-05 RX ORDER — ACETAMINOPHEN 500 MG
1000 TABLET ORAL ONCE
Status: COMPLETED | OUTPATIENT
Start: 2024-03-05 | End: 2024-03-05

## 2024-03-05 RX ADMIN — PROCHLORPERAZINE EDISYLATE 5 MG: 5 INJECTION INTRAMUSCULAR; INTRAVENOUS at 14:58

## 2024-03-05 RX ADMIN — ACETAMINOPHEN 1000 MG: 500 TABLET ORAL at 15:00

## 2024-03-05 RX ADMIN — SODIUM CHLORIDE, POTASSIUM CHLORIDE, SODIUM LACTATE AND CALCIUM CHLORIDE 1000 ML: 600; 310; 30; 20 INJECTION, SOLUTION INTRAVENOUS at 15:01

## 2024-03-05 RX ADMIN — KETOROLAC TROMETHAMINE 15 MG: 15 INJECTION, SOLUTION INTRAMUSCULAR; INTRAVENOUS at 16:26

## 2024-03-05 RX ADMIN — IOPAMIDOL 115 ML: 755 INJECTION, SOLUTION INTRAVENOUS at 13:04

## 2024-03-05 RX ADMIN — DIPHENHYDRAMINE HYDROCHLORIDE 25 MG: 50 INJECTION INTRAMUSCULAR; INTRAVENOUS at 14:58

## 2024-03-05 NOTE — DISCHARGE INSTRUCTIONS
Take the provided antibiotic as directed.  Maintain good oral hydration and the provided cough medicine may help with symptoms as well.  I think it would be beneficial to contact the provided number and follow-up with general neurology.  While today's workup was reassuring if your symptoms change or worsen please return to the ED or seek other medical care.

## 2024-03-05 NOTE — ED PROVIDER NOTES
EMERGENCY DEPARTMENT ENCOUNTER    Pt Name: Deborah Jones  MRN: [id number]  Pt :   1950  Room Number:    Date of encounter:  3/5/2024  PCP: Katrin Varghese DO  ED Provider: Ramon Gresham MD    Historian: Patient, daughter      HPI:  Chief Complaint: Headache, facial numbness, scalp numbness        Context: Deborah Jones is a 73-year-old woman with history of stroke who presents for concern that she may be having another stroke.  She is a little unclear on her last known normal she says she has been foggy and not feeling well on and off for a week now.  She says she has had intermittent right face and leg numbness.  She says she is having a headache as well.  She also says her vision has gotten worse though her daughter says she was just diagnosed with cataracts and is being set up for surgery for this.  She has diabetes but feels like she is well-controlled.  She has been having cramps in her right leg and intermittent numbness in her right leg as well.  No other complaints at this time.       PAST MEDICAL HISTORY  Past Medical History:   Diagnosis Date    Impaired functional mobility, balance, gait, and endurance     Stroke (cerebrum)          PAST SURGICAL HISTORY  No past surgical history on file.      FAMILY HISTORY  No family history on file.      SOCIAL HISTORY  Social History     Socioeconomic History    Marital status:    Tobacco Use    Smoking status: Never    Smokeless tobacco: Never   Substance and Sexual Activity    Alcohol use: Yes     Alcohol/week: 1.0 standard drink of alcohol     Types: 1 Glasses of wine per week     Comment: occas.         ALLERGIES  Patient has no known allergies.        REVIEW OF SYSTEMS  Review of Systems       All systems reviewed and negative except for those discussed in HPI.       PHYSICAL EXAM    I have reviewed the triage vital signs and nursing notes.    ED Triage Vitals [24 1210]   Temp Heart Rate Resp BP SpO2   97.9 °F (36.6 °C) 89  18 140/99 95 %      Temp src Heart Rate Source Patient Position BP Location FiO2 (%)   Oral Monitor Sitting Left arm --       Physical Exam  GENERAL:   Appears in no acute distress.   HENT: Nares patent.  Endorses altered sensation over the right occipital area when compared with the left, endorses altered sensation when comparing the left face with the right.  Neck is supple, not meningitic  EYES: No scleral icterus.  CV: Regular rhythm, regular rate.  RESPIRATORY: Normal effort.  No audible wheezes, rales or rhonchi.  ABDOMEN: Soft, nontender  MUSCULOSKELETAL: No deformities.   NEURO: Alert, intermittently confused in conversation daughter reports this is baseline, facial paresthesias described above, moves all extremities, follows commands.  SKIN: Warm, dry, no rash visualized.      LAB RESULTS  Recent Results (from the past 24 hour(s))   Comprehensive Metabolic Panel    Collection Time: 03/05/24 12:48 PM    Specimen: Blood   Result Value Ref Range    Glucose 153 (H) 65 - 99 mg/dL    BUN 26 (H) 8 - 23 mg/dL    Creatinine 1.18 (H) 0.57 - 1.00 mg/dL    Sodium 139 136 - 145 mmol/L    Potassium 4.1 3.5 - 5.2 mmol/L    Chloride 104 98 - 107 mmol/L    CO2 25.0 22.0 - 29.0 mmol/L    Calcium 9.7 8.6 - 10.5 mg/dL    Total Protein 8.0 6.0 - 8.5 g/dL    Albumin 4.8 3.5 - 5.2 g/dL    ALT (SGPT) 21 1 - 33 U/L    AST (SGOT) 26 1 - 32 U/L    Alkaline Phosphatase 118 (H) 39 - 117 U/L    Total Bilirubin 0.2 0.0 - 1.2 mg/dL    Globulin 3.2 gm/dL    A/G Ratio 1.5 g/dL    BUN/Creatinine Ratio 22.0 7.0 - 25.0    Anion Gap 10.0 5.0 - 15.0 mmol/L    eGFR 49.2 (L) >60.0 mL/min/1.73   Single High Sensitivity Troponin T    Collection Time: 03/05/24 12:48 PM    Specimen: Blood   Result Value Ref Range    HS Troponin T 11 <14 ng/L   Magnesium    Collection Time: 03/05/24 12:48 PM    Specimen: Blood   Result Value Ref Range    Magnesium 1.8 1.6 - 2.4 mg/dL   Green Top (Gel)    Collection Time: 03/05/24 12:48 PM   Result Value Ref Range     Extra Tube Hold for add-ons.    Lavender Top    Collection Time: 03/05/24 12:48 PM   Result Value Ref Range    Extra Tube hold for add-on    Gold Top - SST    Collection Time: 03/05/24 12:48 PM   Result Value Ref Range    Extra Tube Hold for add-ons.    Gray Top    Collection Time: 03/05/24 12:48 PM   Result Value Ref Range    Extra Tube Hold for add-ons.    Light Blue Top    Collection Time: 03/05/24 12:48 PM   Result Value Ref Range    Extra Tube Hold for add-ons.    CBC Auto Differential    Collection Time: 03/05/24 12:48 PM    Specimen: Blood   Result Value Ref Range    WBC 6.19 3.40 - 10.80 10*3/mm3    RBC 4.32 3.77 - 5.28 10*6/mm3    Hemoglobin 12.8 12.0 - 15.9 g/dL    Hematocrit 39.6 34.0 - 46.6 %    MCV 91.7 79.0 - 97.0 fL    MCH 29.6 26.6 - 33.0 pg    MCHC 32.3 31.5 - 35.7 g/dL    RDW 12.9 12.3 - 15.4 %    RDW-SD 43.4 37.0 - 54.0 fl    MPV 8.8 6.0 - 12.0 fL    Platelets 343 140 - 450 10*3/mm3    Neutrophil % 54.5 42.7 - 76.0 %    Lymphocyte % 35.7 19.6 - 45.3 %    Monocyte % 4.7 (L) 5.0 - 12.0 %    Eosinophil % 4.5 0.3 - 6.2 %    Basophil % 0.3 0.0 - 1.5 %    Immature Grans % 0.3 0.0 - 0.5 %    Neutrophils, Absolute 3.37 1.70 - 7.00 10*3/mm3    Lymphocytes, Absolute 2.21 0.70 - 3.10 10*3/mm3    Monocytes, Absolute 0.29 0.10 - 0.90 10*3/mm3    Eosinophils, Absolute 0.28 0.00 - 0.40 10*3/mm3    Basophils, Absolute 0.02 0.00 - 0.20 10*3/mm3    Immature Grans, Absolute 0.02 0.00 - 0.05 10*3/mm3    nRBC 0.0 0.0 - 0.2 /100 WBC   Sedimentation Rate    Collection Time: 03/05/24 12:48 PM    Specimen: Blood   Result Value Ref Range    Sed Rate 45 (H) 0 - 30 mm/hr   POC Protime / INR    Collection Time: 03/05/24 12:48 PM    Specimen: Blood   Result Value Ref Range    Protime 12.9 12.8 - 15.2 seconds    INR 1.1 0.8 - 1.2   POC CHEM 8    Collection Time: 03/05/24 12:49 PM    Specimen: Blood   Result Value Ref Range    Glucose 145 (H) 70 - 130 mg/dL    BUN 25 8 - 26 mg/dL    Creatinine 1.20 0.60 - 1.30 mg/dL    Sodium  142 138 - 146 mmol/L    POC Potassium 4.2 3.5 - 4.9 mmol/L    Chloride 106 98 - 109 mmol/L    Total CO2 26 24 - 29 mmol/L    Hemoglobin 13.6 12.0 - 17.0 g/dL    Hematocrit 40 38 - 51 %    Ionized Calcium 1.23 1.20 - 1.32 mmol/L    eGFR 48.2 (L) >60.0 mL/min/1.73   Urinalysis With Microscopic If Indicated (No Culture) - Urine, Clean Catch    Collection Time: 03/05/24  1:42 PM    Specimen: Urine, Clean Catch   Result Value Ref Range    Color, UA Yellow Yellow, Straw    Appearance, UA Clear Clear    pH, UA 5.5 5.0 - 8.0    Specific Gravity, UA 1.055 (H) 1.001 - 1.030    Glucose, UA Negative Negative    Ketones, UA Negative Negative    Bilirubin, UA Negative Negative    Blood, UA Negative Negative    Protein, UA Negative Negative    Leuk Esterase, UA Moderate (2+) (A) Negative    Nitrite, UA Negative Negative    Urobilinogen, UA 0.2 E.U./dL 0.2 - 1.0 E.U./dL   Urinalysis, Microscopic Only - Urine, Clean Catch    Collection Time: 03/05/24  1:42 PM    Specimen: Urine, Clean Catch   Result Value Ref Range    RBC, UA 0-2 None Seen, 0-2 /HPF    WBC, UA 11-20 (A) None Seen, 0-2 /HPF    Bacteria, UA None Seen None Seen, Trace /HPF    Squamous Epithelial Cells, UA 3-6 (A) None Seen, 0-2 /HPF    Hyaline Casts, UA 0-6 0 - 6 /LPF    Methodology Automated Microscopy    ECG 12 Lead Chest Pain    Collection Time: 03/05/24  2:03 PM   Result Value Ref Range    QT Interval 392 ms    QTC Interval 420 ms       If labs were ordered, I independently reviewed the results and considered them in treating the patient.        RADIOLOGY  MRI Brain Without Contrast    Result Date: 3/5/2024  MRI BRAIN WO CONTRAST Date of Exam: 3/5/2024 3:21 PM EST Indication: Stroke, follow up.  Comparison: Noncontrast brain MRI dated 2/10/2022. Technique:  Routine multiplanar/multisequence sequence images of the brain were obtained without contrast administration. FINDINGS: Foci of T2/FLAIR signal hyperintensity are seen within the bilateral hemispheric white  matter. Midline structures appear unremarkable. No significant mass effect, intracranial hemorrhage, or hydrocephalus is identified. Diffusion-weighted sequences demonstrate no acute infarct.The visualized intracranial flow-voids appear unremarkable. The paranasal sinuses and mastoid air cells show no fluid signal. The orbits, globes, retrobulbar soft tissues appear unremarkable. The visualized superficial soft tissues and cervical spine demonstrate no significant abnormality.     1.Findings compatible with chronic microvascular ischemic change. 2.No diffusion restriction is identified to suggest acute infarct. Electronically Signed: Alexis Rudolph MD  3/5/2024 3:39 PM EST  Workstation ID: [id number]    XR Chest 1 View    Result Date: 3/5/2024  XR CHEST 1 VW Date of Exam: 3/5/2024 1:39 PM EST Indication: Weak/Dizzy/AMS triage protocol Comparison: 9/8/2022. FINDINGS: No focal airspace opacity. No pleural effusion or pneumothorax. Normal heart and mediastinal contours.     No evidence of acute disease in the chest. Electronically Signed: Kedar De La Paz MD  3/5/2024 1:57 PM EST  Workstation ID: [id number]    CT CEREBRAL PERFUSION WITH & WITHOUT CONTRAST    Result Date: 3/5/2024  CT CEREBRAL PERFUSION W WO CONTRAST, CT ANGIOGRAM NECK, CT ANGIOGRAM HEAD W AI ANALYSIS OF LVO Date of Exam: 3/5/2024 12:54 PM EST Indication: Neuro deficit, acute, stroke suspected.  Comparison: 2/9/2022. Technique: Axial CT images of the brain were obtained prior to and after the administration of 115 mL Isovue-370. CT Perfusion protocol was utilized. Automated post processing was performed by RAPID software and submitted to PACS for interpretation.  CTA  of the head and neck were performed after the administration of iodinated contrast.  Reconstructed coronal and sagittal images were also obtained. A 3-D volume rendered image was created for interpretation. Automated exposure control and iterative reconstruction methods were used. Findings: CT  perfusion: Color maps are symmetric, without evidence of core infarct or significant territorial ischemic tissue at risk CT ANGIOGRAM: The lung apices are clear. Evaluation of the neck soft tissues demonstrates no pathologic cervical adenopathy or unexpected aerodigestive tract mass. The osseous structures demonstrate multilevel spondylosis, otherwise without evidence of acute fracture or aggressive osseous lesion. Patent aortic arch with three-vessel branching. The visualized subclavian arteries are patent bilaterally. Diffusely diminutive left vertebral artery noted. The right vertebral artery is patent. The right ICA origin demonstrates no significant atherosclerotic narrowing. There is similar 0% stenosis of the left ICA origin. Intracranially, the carotid siphons demonstrate calcific atherosclerosis with some associated mild narrowing of the left greater than right  supraclinoid segments. The anterior cerebral arteries are normal in course and caliber bilaterally. The right middle cerebral artery demonstrates no evidence of flow-limiting stenosis, large vessel occlusion or aneurysm. The left middle cerebral artery is similarly normal in course and caliber. The vertebrobasilar system is patent. The posterior cerebral arteries are normal in course and caliber bilaterally.     Impression: CT perfusion demonstrates no evidence of core infarct or significant territorial ischemic tissue at risk. CT angiogram demonstrates no evidence of new high-grade flow-limiting stenosis, large vessel occlusion or aneurysm. Some multifocal atherosclerotic changes appear stable including some associated mild narrowing of the supraclinoid internal carotid arteries. Electronically Signed: Kedar De La Paz MD  3/5/2024 1:18 PM EST  Workstation ID: [id number]    CT Angiogram Head w AI Analysis of LVO    Result Date: 3/5/2024  CT CEREBRAL PERFUSION W WO CONTRAST, CT ANGIOGRAM NECK, CT ANGIOGRAM HEAD W AI ANALYSIS OF LVO Date of Exam:  3/5/2024 12:54 PM EST Indication: Neuro deficit, acute, stroke suspected.  Comparison: 2/9/2022. Technique: Axial CT images of the brain were obtained prior to and after the administration of 115 mL Isovue-370. CT Perfusion protocol was utilized. Automated post processing was performed by RAPID software and submitted to PACS for interpretation.  CTA  of the head and neck were performed after the administration of iodinated contrast.  Reconstructed coronal and sagittal images were also obtained. A 3-D volume rendered image was created for interpretation. Automated exposure control and iterative reconstruction methods were used. Findings: CT perfusion: Color maps are symmetric, without evidence of core infarct or significant territorial ischemic tissue at risk CT ANGIOGRAM: The lung apices are clear. Evaluation of the neck soft tissues demonstrates no pathologic cervical adenopathy or unexpected aerodigestive tract mass. The osseous structures demonstrate multilevel spondylosis, otherwise without evidence of acute fracture or aggressive osseous lesion. Patent aortic arch with three-vessel branching. The visualized subclavian arteries are patent bilaterally. Diffusely diminutive left vertebral artery noted. The right vertebral artery is patent. The right ICA origin demonstrates no significant atherosclerotic narrowing. There is similar 0% stenosis of the left ICA origin. Intracranially, the carotid siphons demonstrate calcific atherosclerosis with some associated mild narrowing of the left greater than right  supraclinoid segments. The anterior cerebral arteries are normal in course and caliber bilaterally. The right middle cerebral artery demonstrates no evidence of flow-limiting stenosis, large vessel occlusion or aneurysm. The left middle cerebral artery is similarly normal in course and caliber. The vertebrobasilar system is patent. The posterior cerebral arteries are normal in course and caliber bilaterally.      Impression: CT perfusion demonstrates no evidence of core infarct or significant territorial ischemic tissue at risk. CT angiogram demonstrates no evidence of new high-grade flow-limiting stenosis, large vessel occlusion or aneurysm. Some multifocal atherosclerotic changes appear stable including some associated mild narrowing of the supraclinoid internal carotid arteries. Electronically Signed: Kedar De La Paz MD  3/5/2024 1:18 PM EST  Workstation ID: [id number]    CT Angiogram Neck    Result Date: 3/5/2024  CT CEREBRAL PERFUSION W WO CONTRAST, CT ANGIOGRAM NECK, CT ANGIOGRAM HEAD W AI ANALYSIS OF LVO Date of Exam: 3/5/2024 12:54 PM EST Indication: Neuro deficit, acute, stroke suspected.  Comparison: 2/9/2022. Technique: Axial CT images of the brain were obtained prior to and after the administration of 115 mL Isovue-370. CT Perfusion protocol was utilized. Automated post processing was performed by RAPID software and submitted to PACS for interpretation.  CTA  of the head and neck were performed after the administration of iodinated contrast.  Reconstructed coronal and sagittal images were also obtained. A 3-D volume rendered image was created for interpretation. Automated exposure control and iterative reconstruction methods were used. Findings: CT perfusion: Color maps are symmetric, without evidence of core infarct or significant territorial ischemic tissue at risk CT ANGIOGRAM: The lung apices are clear. Evaluation of the neck soft tissues demonstrates no pathologic cervical adenopathy or unexpected aerodigestive tract mass. The osseous structures demonstrate multilevel spondylosis, otherwise without evidence of acute fracture or aggressive osseous lesion. Patent aortic arch with three-vessel branching. The visualized subclavian arteries are patent bilaterally. Diffusely diminutive left vertebral artery noted. The right vertebral artery is patent. The right ICA origin demonstrates no significant  atherosclerotic narrowing. There is similar 0% stenosis of the left ICA origin. Intracranially, the carotid siphons demonstrate calcific atherosclerosis with some associated mild narrowing of the left greater than right  supraclinoid segments. The anterior cerebral arteries are normal in course and caliber bilaterally. The right middle cerebral artery demonstrates no evidence of flow-limiting stenosis, large vessel occlusion or aneurysm. The left middle cerebral artery is similarly normal in course and caliber. The vertebrobasilar system is patent. The posterior cerebral arteries are normal in course and caliber bilaterally.     Impression: CT perfusion demonstrates no evidence of core infarct or significant territorial ischemic tissue at risk. CT angiogram demonstrates no evidence of new high-grade flow-limiting stenosis, large vessel occlusion or aneurysm. Some multifocal atherosclerotic changes appear stable including some associated mild narrowing of the supraclinoid internal carotid arteries. Electronically Signed: Kedar De La Paz MD  3/5/2024 1:18 PM EST  Workstation ID: [id number]    CT Head Without Contrast Stroke Protocol    Result Date: 3/5/2024  CT HEAD WO CONTRAST STROKE PROTOCOL Date of Exam: 3/5/2024 12:42 PM EST Indication: Stroke, follow up. Comparison: 9/8/2022 Technique: Axial CT images were obtained of the head without contrast administration.  Reconstructed coronal images were also obtained. Automated exposure control and iterative construction methods were used. Scan Time: 1240 Results discussed with Dr. Gresham at 12:45 p.m. 3/5/2024. Findings: Prior study report from 9/8/2022 noted no acute intracranial findings, likely chronic lacunar infarct of the left basal ganglia. Today's study shows the calvarium to appear intact. Included paranasal sinuses and mastoids appear clear. No gross abnormalities are seen in the orbits. There is age-appropriate generalized cerebral atrophy. Small old infarct in  left basal ganglia and external capsule appears unchanged. There are mild chronic appearing changes of the internal capsules, also stable. There is no evidence of new infarction,  no evidence of edema, hemorrhage, hydrocephalus, mass or mass effect or abnormal extra-axial collection.     Impression: No new intracranial disease. Electronically Signed: Donis Herrera MD  3/5/2024 12:54 PM EST  Workstation ID: [id number]     I ordered and independently reviewed the above noted radiographic studies.      I viewed images of Noncon head CT personally reviewed while in the scanner with the patient as a stroke alert which showed no acute abnormalities per my independent interpretation.  CTAs and CT perfusion's reviewed with neurostroke team no acute perfusion deficits or other lesions that would be amenable to thrombectomy.  MRI of the brain which does not show any acute infarctions or other acute pathology that I can appreciate.  Chest x-ray which is clear.    See radiologist's dictation for official interpretation.        PROCEDURES    Procedures    ECG 12 Lead Chest Pain   Preliminary Result   Test Reason : NUMBNESS   Blood Pressure :   */*   mmHG   Vent. Rate :  69 BPM     Atrial Rate :  69 BPM      P-R Int : 130 ms          QRS Dur :  74 ms       QT Int : 392 ms       P-R-T Axes :  49 -31   3 degrees      QTc Int : 420 ms      Normal sinus rhythm   Left axis deviation   Abnormal ECG   When compared with ECG of 08-SEP-2022 11:37,   No significant change was found      Referred By: JO ANN           Confirmed By:           MEDICATIONS GIVEN IN ER    Medications   lactated ringers bolus 1,000 mL (0 mL Intravenous Stopped 3/5/24 1601)   acetaminophen (TYLENOL) tablet 1,000 mg (1,000 mg Oral Given 3/5/24 1500)   prochlorperazine (COMPAZINE) injection 5 mg (5 mg Intravenous Given 3/5/24 1458)   diphenhydrAMINE (BENADRYL) injection 25 mg (25 mg Intravenous Given 3/5/24 1458)   iopamidol (ISOVUE-370) 76 % injection 150 mL (115 mL  Intravenous Given 3/5/24 1304)   ketorolac (TORADOL) injection 15 mg (15 mg Intravenous Given 3/5/24 1626)         MEDICAL DECISION MAKING, PROGRESS, and CONSULTS    All labs, if obtained, have been independently reviewed by me.  All radiology studies, if obtained, have been reviewed by me and the radiologist dictating the report.  All EKG's, if obtained, have been independently viewed and interpreted by me/my attending physician.      Discussion below represents my analysis of pertinent findings related to patient's condition, differential diagnosis, treatment plan and final disposition.                         Differential diagnosis:    Ischemic stroke, hemorrhagic stroke, complex migraine, brain mass, meningitis, sepsis, anemia, urinary tract infection, temporal arteritis      Additional sources:    - Discussed/ obtained information from independent historians: Daughter    - External (non-ED) record review: Previous notes for [facility] where she was worked up for giant cell arteritis at that time her ESR was 103 but biopsy was negative.    - Chronic or social conditions impacting care: History of stroke, and compared mobility,    - Shared decision making: Agreeable to discharge with neurology follow-up      Orders placed during this visit:  Orders Placed This Encounter   Procedures    XR Chest 1 View    CT Head Without Contrast Stroke Protocol    CT CEREBRAL PERFUSION WITH & WITHOUT CONTRAST    CT Angiogram Head w AI Analysis of LVO    CT Angiogram Neck    MRI Brain Without Contrast    [place] Draw    Comprehensive Metabolic Panel    Single High Sensitivity Troponin T    Magnesium    Urinalysis With Microscopic If Indicated (No Culture) - Urine, Clean Catch    CBC Auto Differential    Sedimentation Rate    Urinalysis, Microscopic Only - Urine, Clean Catch    POC Protime / INR    POC CHEM 8    ECG 12 Lead Chest Pain    CBC & Differential    Green Top (Gel)    Lavender Top    Gold Top - SST    Gray Top     Light Blue Top         Additional orders considered but not ordered:      ED Course:    Consultants:      ED Course as of 03/05/24 1831   Tue Mar 05, 2024   1240 This is a 73-year-old woman with history of stroke who presents for concern that she may be having another stroke.  She is a little unclear on her last known normal she says she has been foggy and not feeling well on and off for a week now.  She says she has had intermittent right face and leg numbness.  She says she is having a headache as well.  She also says her vision has gotten worse though her daughter says she was just diagnosed with cataracts and is being set up for surgery for this.  She has diabetes but feels like she is well-controlled.  She has been having cramps in her right leg and intermittent numbness in her right leg as well.  No other complaints at this time. [CC]   1241 Patient arrived awake and alert but is confused having difficulty providing a good history initially was reporting symptoms for 1 week after further discussion she is saying that her symptoms have been present since 1 hour before she called her daughter today at 9:40 AM and they are now in agreement that last known normal was 8:40 AM.  She says she was not this way when she woke up.  She is having altered sensation when comparing the left face with the right.  Also reporting intermittent numbness in her right leg strength seems intact on exam.  [CC]   1244 After much deliberation with the patient and her daughter finally determine the last known normal is within 24 hours she was made a stroke alert taken directly to CT scanner.  Neurostroke team is on hand.  Because of the headache treating with IV fluids Compazine diphenhydramine and acetaminophen as well. [CC]   1512 Neurostroke team is scoring an NIH of 1 though also has relatively low concern for acute stroke.  Noncon head CT personally reviewed on the scanner with the patient no acute abnormalities that I can  appreciate. [CC]   1513 Likewise CTAs and CT perfusion's do not show any acute perfusion deficits or vascular injuries that we can appreciate.  Neurostroke team is recommending MRI but think she may be able to safely discharge as long as that is negative they have ordered the MRI. [CC]   1519 CBC is reassuring and nonactionable.  Sed rate slightly elevated at 45 this would not be consistent with temporal arteritis pressure in the setting of her having similar symptoms on having a negative biopsy in the past.  Metabolic panel shows hyperglycemia 153 and mildly elevated creatinine 1.18 not meeting criteria for acute kidney injury.  Urinalysis has gross pyuria and leuks consistent with urinary tract infection. [CC]   1608 MRI negative for acute infarction neurostroke team has contacted me from their perspective she is safe for discharge.  Upon reevaluation after treatment with headache medicine she feels like the numbness has improved.  She is not complaining of a nagging cough continually has already gotten a chest x-ray to be which does not show any pneumonia or other acute pathology.  Providing antibiotics for urinary tract infection counseled on importance of good oral hydration.  She will follow-up with general neurology.  Discharged in stable condition with strict return precautions. [CC]      ED Course User Index  [CC] Ramon Gresham MD              Shared Decision Making:  After my consideration of clinical presentation and any laboratory/radiology studies obtained, I discussed the findings with the patient/patient representative who is in agreement with the treatment plan and the final disposition.   Risks and benefits of discharge and/or observation/admission were discussed.       AS OF 18:31 EST VITALS:    BP - 135/80  HR - 80  TEMP - 97.9 °F (36.6 °C) (Oral)  O2 SATS - 96%                  DIAGNOSIS  Final diagnoses:   Acute nonintractable headache, unspecified headache type   Numbness   Acute UTI  (urinary tract infection)   History of stroke   Acute cough         DISPOSITION  DISCHARGE    Patient discharged in stable condition.    Reviewed implications of results, diagnosis, meds, responsibility to follow up, warning signs and symptoms of possible worsening, potential complications and reasons to return to ER.    Patient/Family voiced understanding of above instructions.    Discussed plan for discharge, as there is no emergent indication for admission.  Pt/family is agreeable and understands need for follow up and possible repeat testing.  Pt/family is aware that discharge does not mean that nothing is wrong but that it indicates no emergency is currently present that requires admission and they must continue care with follow-up as given below or with a physician of their choice.     FOLLOW-UP  Katrin Varghese DO  [address]  [address]  [place] [address]  [phone]    Call       [facility]UR[facility]  [address] [id number] [phone]  Call   For neurology follow-up.         Medication List        New Prescriptions      benzonatate 100 MG capsule  Commonly known as: TESSALON  Take 1 capsule by mouth 3 (Three) Times a Day As Needed for Cough.     cefdinir 300 MG capsule  Commonly known as: OMNICEF  Take 1 capsule by mouth 2 (Two) Times a Day for 7 days.               Where to Get Your Medications        These medications were sent to [facility] [id number] - [place], [place] - [address] [facility] - [phone]  - [phone]  [address] [place] [address], [facility] [address]      Phone: [phone]   benzonatate 100 MG capsule  cefdinir 300 MG capsule             Please note that portions of this document were completed with voice recognition software.        Ramon Gresham MD  03/05/24 3027

## 2024-03-05 NOTE — CONSULTS
Stroke Consult Note    Patient Name: Deborah Jones      MRN: [id number]  Age: 73 y.o.     Sex: female     : 1950  Primary Care Physician: Katrin Varghese DO  Referring Physician: Dr. Gresham, [facility] ED  Handedness: Right  Race: -American  Time Stroke Team Called: 1237        Time Patient Seen: 1240  Chief Complaint/Reason for Consultation: Headache with sensory deficits    HPI  Last Known Normal Date/Time: 0800 on 3/5/2024    This patient is a 73-year-old female with past medical history significant for prior stroke (left anterior choroidal and 2022), type 2 diabetes, and hyperlipidemia who presented to [facility] ED with complaint of headache and sensory deficits.  Per ED provider, there was initially a lack of clarity as to the last known normal between the patient and the patient's family at bedside.  At that time it was believed the last known normal was sometime approximately 1 week ago.  When I interviewed the patient, she stated the last known normal was approximately 0800 this morning.  The patient states that shortly after 0800 she noted a headache with numbness on the left side of her face and the top of her head and numbness in her right leg.  She also reports mild cramping of her right leg.    On my exam, patient is NIH 1 for decreased sensation to light touch on the right side of the face and RUE.  She does not endorse any other sensory deficits on exam.  No other focal neurological deficits are appreciated.  Code stroke CTh with no evidence of acute intracranial abnormality.  CT perfusion with no evidence of core infarct or significant territorial ischemic tissue at risk.  CTA H/N with stable mild to moderate intracranial atherosclerotic changes with no evidence of new high-grade flow-limiting stenosis, LVO, or aneurysm.  Patient was treated with a migraine cocktail per ED provider.  Admission status pending MRI results and reassessment for resolution of symptoms.    Review of Systems    Constitutional:  Negative for chills and fatigue.   HENT:  Negative for trouble swallowing.    Eyes:  Negative for visual disturbance.   Respiratory:  Negative for shortness of breath.    Cardiovascular:  Negative for chest pain.   Gastrointestinal:  Negative for abdominal pain.   Musculoskeletal:  Negative for myalgias.   Neurological:  Positive for numbness and headaches. Negative for tremors, facial asymmetry, speech difficulty, weakness and light-headedness.   Psychiatric/Behavioral:  Negative for behavioral problems. The patient is not nervous/anxious.       Objective  Neurological Exam  Mental Status  Alert. Oriented to person, place, time and situation. Speech is normal. Language is fluent with no aphasia. Attention and concentration are normal. Fund of knowledge is appropriate for level of education.    Cranial Nerves  CN II: Visual fields full to confrontation.  CN III, IV, VI: Extraocular movements intact bilaterally. Pupils equal round and reactive to light bilaterally.  CN V:  Right: Diminished sensation of the entire right side of the face.  Left: Facial sensation is normal on the left.  CN VII:  Right: There is no facial weakness.  Left: There is no facial weakness.  CN VIII: Hearing grossly intact bilaterally.  CN IX, X: Palate elevates symmetrically  CN XII: Tongue midline without atrophy or fasciculations.    Motor  Normal muscle bulk throughout. Normal muscle tone. Strength is 5/5 throughout all four extremities.    Sensory  Decrease sensation to light touch in RUE.    Coordination  Right: Finger-to-nose normal. Rapid alternating movement normal. Heel-to-shin normal.Left: Finger-to-nose normal. Rapid alternating movement normal. Heel-to-shin normal.    Gait  Casual gait is normal including stance, stride, and arm swing.    Physical Exam  Constitutional:       General: She is not in acute distress.  HENT:      Head: Normocephalic and atraumatic.      Mouth/Throat:      Mouth: Mucous membranes are  moist.   Eyes:      Extraocular Movements: Extraocular movements intact.      Pupils: Pupils are equal, round, and reactive to light.   Cardiovascular:      Rate and Rhythm: Normal rate.   Pulmonary:      Effort: Pulmonary effort is normal.   Musculoskeletal:         General: No swelling or deformity.   Skin:     General: Skin is warm and dry.   Neurological:      Mental Status: She is alert.      Motor: Motor strength is normal.  Psychiatric:         Mood and Affect: Mood normal.         Speech: Speech normal.         Behavior: Behavior normal.     Temp:  [97.9 °F (36.6 °C)] 97.9 °F (36.6 °C)  Heart Rate:  [89] 89  Resp:  [18] 18  BP: (140)/(99) 140/99    Past Medical History:   Diagnosis Date    Impaired functional mobility, balance, gait, and endurance     Stroke (cerebrum)      No past surgical history on file.  No family history on file.  Social History     Socioeconomic History    Marital status:    Tobacco Use    Smoking status: Never    Smokeless tobacco: Never   Substance and Sexual Activity    Alcohol use: Yes     Alcohol/week: 1.0 standard drink of alcohol     Types: 1 Glasses of wine per week     Comment: occas.     No Known Allergies  Prior to Admission medications    Medication Sig Start Date End Date Taking? Authorizing Provider   aspirin 325 MG tablet Take 325 mg by mouth Daily.    Dali Manuel MD   atorvastatin (LIPITOR) 80 MG tablet Take 1 tablet by mouth Every Night. 2/15/22   Radha Gutierrez, DO   Blood Glucose Monitoring Suppl (ONE TOUCH ULTRA 2) w/Device kit  2/25/22   Dali Maneul MD   fluconazole (DIFLUCAN) 200 MG tablet 2 PO day one then 1 PO daily. Total course 21 days 10/24/22   Lucien Butler MD   glipizide (GLUCOTROL) 5 MG tablet  3/29/22   Dali Manuel MD   Insulin Glargine (LANTUS SOLOSTAR) 100 UNIT/ML injection pen Inject 20 Units under the skin into the appropriate area as directed Every Night. 10/24/22   Lucien Butler MD    metFORMIN ER (GLUCOPHAGE-XR) 500 MG 24 hr tablet Take 500 mg by mouth. 3/7/22 3/7/23  Dali Manuel MD   multivitamin with minerals tablet tablet Take 1 tablet by mouth Daily.    Dali Manuel MD   Omega-3 Fatty Acids (fish oil) 1000 MG capsule capsule Take 1,000 mg by mouth Daily With Breakfast.    Dali Manuel MD   OneTouch Ultra test strip  2/25/22   Dali Manuel MD   traMADol (ULTRAM) 50 MG tablet Take 1 tablet by mouth Every 6 (Six) Hours As Needed. 9/29/22      triamcinolone (KENALOG) 0.1 % cream  5/11/22   Dali Manuel MD     Acute Stroke Data  Alteplase (tPA) Inclusion / Exclusion Criteria  Person Administering Scale: Horace Lora PA-C    Inclusion Criteria  [x]   18 years of age or greater   []   Onset of symptoms < 4.5 hours before beginning treatment (stroke onset = time patient was last seen well or without symptoms).   []   Diagnosis of acute ischemic stroke causing measurable disabling deficit (Complete Hemianopia, Any Aphasia, Visual or Sensory Extinction, Any weakness limiting sustained effort against gravity)   []   Any remaining deficit considered potentially disabling in view of patient and practitioner   Exclusion criteria (Do not proceed with Alteplase if any are checked under exclusion criteria)  [x]   Onset unknown or GREATER than 4.5 hours   []   ICH on CT/MRI   []   CT demonstrates hypodensity representing acute or subacute infarct   []   Significant head trauma or prior stroke in the previous 3 months   []   Symptoms suggestive of subarachnoid hemorrhage   []   History of un-ruptured intracranial aneurysm GREATER than 10 mm   []   Recent intracranial or intraspinal surgery within the last 3 months   []   Arterial puncture at a non-compressible site in the previous 7 days   []   Active internal bleeding   []   Acute bleeding tendency   []   Platelet count LESS than 100,000 for known hematological diseases such as leukemia, thrombocytopenia or  chronic cirrhosis   []   Current use of anticoagulant with INR GREATER than 1.7 or PT GREATER than 15 seconds, aPTT GREATER than 40 seconds   []   Heparin received within 48 hours, resulting in abnormally elevated aPTT GREATER than upper limit of normal   []   Current use of direct thrombin inhibitors or direct factor Xa inhibitors in the past 48 hours   []   Elevated blood pressure refractory to treatment (systolic GREATER than 185 mm/Hg or diastolic  GREATER than 110 mm/Hg   []   Suspected infective endocarditis and aortic arch dissection   []   Current use of therapeutic treatment dose of low-molecular-weight heparin (LMWH) within the previous 24 hours   []   Structural GI malignancy or bleed   Relative exclusion for all patients  [x]   Only minor non-disabling symptoms   []   Pregnancy   []   Seizure at onset with postictal residual neurological impairments   []   Major surgery or previous trauma within past 14 days   []   History of previous spontaneous ICH, intracranial neoplasm, or AV malformation   []   Postpartum (within previous 14 days)   []   Recent GI or urinary tract hemorrhage (within previous 21 days)   []   Recent acute MI (within previous 3 months)   []   History of un-ruptured intracranial aneurysm LESS than 10 mm   []   History of ruptured intracranial aneurysm   []   Blood glucose LESS than 50 mg/dL (2.7 mmol/L)   []   Dural puncture within the last 7 days   []   Known GREATER than 10 cerebral microbleeds   Additional exclusions for patients with symptoms onset between 3 and 4.5 hours.  []   Age > 80.   []   On any anticoagulants regardless of INR  >>> Warfarin (Coumadin), Heparin, Enoxaparin (Lovenox), fondaparinux (Arixtra), bivalirudin (Angiomax), Argatroban, dabigatran (Pradaxa), rivaroxaban (Xarelto), or apixaban (Eliquis)   []   Severe stroke (NIHSS > 25).   []   History of BOTH diabetes and previous ischemic stroke.   []   The risks and benefits have been discussed with the patient or  family related to the administration of IV Alteplase for stroke symptoms.   []   I have discussed and reviewed the patient's case and imaging with the attending prior to IV Alteplase.   N/A Time Alteplase administered     MODIFIED YOLANDE SCALE (to be assessed for each patient having history of stroke) []Stroke history but not assessed  [x]0: No symptoms at all  []1: No significant disability despite symptoms  []2: Slight disability  []3: Moderate disability  []4: Moderately severe disability  []5: Severe disability  []6: Death      NIH Stroke Scale  Time: 1245  Person Administering Scale: Horace Lora PA-C    1a  Level of consciousness: 0=alert; keenly responsive   1b. LOC questions:  0=Performs both tasks correctly   1c. LOC commands: 0=Performs both tasks correctly   2.  Best Gaze: 0=normal   3.  Visual: 0=No visual loss   4. Facial Palsy: 0=Normal symmetric movement   5a.  Motor left arm: 0=No drift, limb holds 90 (or 45) degrees for full 10 seconds   5b.  Motor right arm: 0=No drift, limb holds 90 (or 45) degrees for full 10 seconds   6a. motor left le=No drift, limb holds 90 (or 45) degrees for full 10 seconds   6b  Motor right le=No drift, limb holds 90 (or 45) degrees for full 10 seconds   7. Limb Ataxia: 0=Absent   8.  Sensory: 1=Mild to moderate sensory loss; patient feels pinprick is less sharp or is dull on the affected side; there is a loss of superficial pain with pinprick but patient is aware She is being touched   9. Best Language:  0=No aphasia, normal   10. Dysarthria: 0=Normal   11. Extinction and Inattention: 0=No abnormality    Total:    1     Hospital Meds  Scheduled- acetaminophen, 1,000 mg, Oral, Once  diphenhydrAMINE, 25 mg, Intravenous, Once  lactated ringers, 1,000 mL, Intravenous, Once  prochlorperazine, 5 mg, Intravenous, Once    Infusions-     PRNs-   sodium chloride    Results Reviewed  I have personally reviewed current lab, radiology, and data and agree with  results.    CT CEREBRAL PERFUSION WITH & WITHOUT CONTRAST    Result Date: 3/5/2024  Impression: CT perfusion demonstrates no evidence of core infarct or significant territorial ischemic tissue at risk. CT angiogram demonstrates no evidence of new high-grade flow-limiting stenosis, large vessel occlusion or aneurysm. Some multifocal atherosclerotic changes appear stable including some associated mild narrowing of the supraclinoid internal carotid arteries. Electronically Signed: Kedar De La Paz MD  3/5/2024 1:18 PM EST  Workstation ID: [id number]    CT Angiogram Head w AI Analysis of LVO    Result Date: 3/5/2024  Impression: CT perfusion demonstrates no evidence of core infarct or significant territorial ischemic tissue at risk. CT angiogram demonstrates no evidence of new high-grade flow-limiting stenosis, large vessel occlusion or aneurysm. Some multifocal atherosclerotic changes appear stable including some associated mild narrowing of the supraclinoid internal carotid arteries. Electronically Signed: Kedar De La Paz MD  3/5/2024 1:18 PM EST  Workstation ID: [id number]    CT Angiogram Neck    Result Date: 3/5/2024  Impression: CT perfusion demonstrates no evidence of core infarct or significant territorial ischemic tissue at risk. CT angiogram demonstrates no evidence of new high-grade flow-limiting stenosis, large vessel occlusion or aneurysm. Some multifocal atherosclerotic changes appear stable including some associated mild narrowing of the supraclinoid internal carotid arteries. Electronically Signed: Kedar De La Paz MD  3/5/2024 1:18 PM EST  Workstation ID: [id number]    CT Head Without Contrast Stroke Protocol    Result Date: 3/5/2024  Impression: No new intracranial disease. Electronically Signed: Donis Herrera MD  3/5/2024 12:54 PM EST  Workstation ID: [id number]      Significant Labs  Sodium: 142  Creatinine: 1.20  Glucose: 145  WBC: 6.19  Hgb/HCT: 12.8/39.6  Platelets: 343    Assessment and Plan  This patient is a  73-year-old female with risk factors significant for prior stroke, T2DM, and HLD who presented to [facility] ED 3/5/2024 with complaint of headache and sensory deficits.  On exam, NIH of 1 for right face and RUE decreased sensation.  Patient also complains of a headache.  Last known well initially unclear, per ED provider.  Patient states to me last known well was approximately 0800 this day.  Code stroke CT imaging negative for acute abnormality.  Patient treated with migraine cocktail per ED provider.    Antiplatelet PTA: Aspirin 325 mg daily  Anticoagulant PTA: None    Headache  Right-sided sensory deficits  -Differentials include acute ischemic stroke/TIA, stroke recrudescence, complex migraine  -Code stroke CTh with no evidence of acute intracranial abnormality  -CTa H/N with stable mild to moderate intracranial atherosclerotic changes with no evidence of new high-grade flow-limiting stenosis, LVO, or aneurysm.  -CT perfusion with no evidence of core infarct or significant territorial ischemic tissue at risk  -Stat MRI brain without contrast pending  -Migraine cocktail per ED provider  -Remainder of stroke order set held at this time pending MRI results.  -Patient safe for discharge from stroke neurology perspective if MRI results are negative for acute stroke and symptoms resolve s/p migraine cocktail    Disposition: Admission status pending MRI results.      Case discussed with the patient, family at bedside, and Dr. Gresham.  Thank you for the consult. Stroke neurology will follow MRI results.  Stroke neurology will continue to follow the patient if admission to the hospital is required      Horace Lora PA-C  [facility] Stroke Neurology